# Patient Record
Sex: FEMALE | Race: ASIAN | NOT HISPANIC OR LATINO | URBAN - METROPOLITAN AREA
[De-identification: names, ages, dates, MRNs, and addresses within clinical notes are randomized per-mention and may not be internally consistent; named-entity substitution may affect disease eponyms.]

---

## 2024-05-06 ENCOUNTER — EMERGENCY (EMERGENCY)
Facility: HOSPITAL | Age: 29
LOS: 1 days | Discharge: HOME | End: 2024-05-06
Attending: EMERGENCY MEDICINE
Payer: COMMERCIAL

## 2024-05-06 VITALS
HEART RATE: 78 BPM | SYSTOLIC BLOOD PRESSURE: 102 MMHG | TEMPERATURE: 98 F | RESPIRATION RATE: 18 BRPM | OXYGEN SATURATION: 100 % | DIASTOLIC BLOOD PRESSURE: 68 MMHG

## 2024-05-06 VITALS
OXYGEN SATURATION: 97 % | TEMPERATURE: 99 F | RESPIRATION RATE: 18 BRPM | DIASTOLIC BLOOD PRESSURE: 98 MMHG | HEART RATE: 98 BPM | SYSTOLIC BLOOD PRESSURE: 150 MMHG

## 2024-05-06 LAB
ALBUMIN SERPL ELPH-MCNC: 4.4 G/DL — SIGNIFICANT CHANGE UP (ref 3.3–5)
ALP SERPL-CCNC: 84 U/L — SIGNIFICANT CHANGE UP (ref 40–120)
ALT FLD-CCNC: 14 U/L — SIGNIFICANT CHANGE UP (ref 10–45)
ANION GAP SERPL CALC-SCNC: 13 MMOL/L — SIGNIFICANT CHANGE UP (ref 5–17)
APPEARANCE UR: CLEAR — SIGNIFICANT CHANGE UP
APTT BLD: 30.3 SEC — SIGNIFICANT CHANGE UP (ref 24.5–35.6)
AST SERPL-CCNC: 19 U/L — SIGNIFICANT CHANGE UP (ref 10–40)
BACTERIA # UR AUTO: ABNORMAL /HPF
BASE EXCESS BLDV CALC-SCNC: 0.3 MMOL/L — SIGNIFICANT CHANGE UP (ref -2–3)
BASOPHILS # BLD AUTO: 0.05 K/UL — SIGNIFICANT CHANGE UP (ref 0–0.2)
BASOPHILS NFR BLD AUTO: 0.5 % — SIGNIFICANT CHANGE UP (ref 0–2)
BILIRUB SERPL-MCNC: 0.5 MG/DL — SIGNIFICANT CHANGE UP (ref 0.2–1.2)
BILIRUB UR-MCNC: NEGATIVE — SIGNIFICANT CHANGE UP
BUN SERPL-MCNC: 10 MG/DL — SIGNIFICANT CHANGE UP (ref 7–23)
CA-I SERPL-SCNC: 1.2 MMOL/L — SIGNIFICANT CHANGE UP (ref 1.15–1.33)
CALCIUM SERPL-MCNC: 9.4 MG/DL — SIGNIFICANT CHANGE UP (ref 8.4–10.5)
CAST: 0 /LPF — SIGNIFICANT CHANGE UP (ref 0–4)
CHLORIDE BLDV-SCNC: 103 MMOL/L — SIGNIFICANT CHANGE UP (ref 96–108)
CHLORIDE SERPL-SCNC: 103 MMOL/L — SIGNIFICANT CHANGE UP (ref 96–108)
CO2 BLDV-SCNC: 27 MMOL/L — HIGH (ref 22–26)
CO2 SERPL-SCNC: 23 MMOL/L — SIGNIFICANT CHANGE UP (ref 22–31)
COLOR SPEC: YELLOW — SIGNIFICANT CHANGE UP
CREAT SERPL-MCNC: 0.64 MG/DL — SIGNIFICANT CHANGE UP (ref 0.5–1.3)
DIFF PNL FLD: ABNORMAL
EGFR: 121 ML/MIN/1.73M2 — SIGNIFICANT CHANGE UP
EOSINOPHIL # BLD AUTO: 0.05 K/UL — SIGNIFICANT CHANGE UP (ref 0–0.5)
EOSINOPHIL NFR BLD AUTO: 0.5 % — SIGNIFICANT CHANGE UP (ref 0–6)
GAS PNL BLDV: 135 MMOL/L — LOW (ref 136–145)
GAS PNL BLDV: SIGNIFICANT CHANGE UP
GAS PNL BLDV: SIGNIFICANT CHANGE UP
GLUCOSE BLDV-MCNC: 107 MG/DL — HIGH (ref 70–99)
GLUCOSE SERPL-MCNC: 110 MG/DL — HIGH (ref 70–99)
GLUCOSE UR QL: NEGATIVE MG/DL — SIGNIFICANT CHANGE UP
HCG SERPL-ACNC: <2 MIU/ML — SIGNIFICANT CHANGE UP
HCO3 BLDV-SCNC: 25 MMOL/L — SIGNIFICANT CHANGE UP (ref 22–29)
HCT VFR BLD CALC: 40.2 % — SIGNIFICANT CHANGE UP (ref 34.5–45)
HCT VFR BLDA CALC: 41 % — SIGNIFICANT CHANGE UP (ref 34.5–46.5)
HGB BLD CALC-MCNC: 13.6 G/DL — SIGNIFICANT CHANGE UP (ref 11.7–16.1)
HGB BLD-MCNC: 13.1 G/DL — SIGNIFICANT CHANGE UP (ref 11.5–15.5)
IMM GRANULOCYTES NFR BLD AUTO: 0.5 % — SIGNIFICANT CHANGE UP (ref 0–0.9)
INR BLD: 1.01 RATIO — SIGNIFICANT CHANGE UP (ref 0.85–1.18)
KETONES UR-MCNC: NEGATIVE MG/DL — SIGNIFICANT CHANGE UP
LACTATE BLDV-MCNC: 1.6 MMOL/L — SIGNIFICANT CHANGE UP (ref 0.5–2)
LDH SERPL L TO P-CCNC: 273 U/L — HIGH (ref 50–242)
LEUKOCYTE ESTERASE UR-ACNC: NEGATIVE — SIGNIFICANT CHANGE UP
LIDOCAIN IGE QN: 18 U/L — SIGNIFICANT CHANGE UP (ref 7–60)
LYMPHOCYTES # BLD AUTO: 1.93 K/UL — SIGNIFICANT CHANGE UP (ref 1–3.3)
LYMPHOCYTES # BLD AUTO: 17.6 % — SIGNIFICANT CHANGE UP (ref 13–44)
MCHC RBC-ENTMCNC: 27.6 PG — SIGNIFICANT CHANGE UP (ref 27–34)
MCHC RBC-ENTMCNC: 32.6 GM/DL — SIGNIFICANT CHANGE UP (ref 32–36)
MCV RBC AUTO: 84.6 FL — SIGNIFICANT CHANGE UP (ref 80–100)
MONOCYTES # BLD AUTO: 0.6 K/UL — SIGNIFICANT CHANGE UP (ref 0–0.9)
MONOCYTES NFR BLD AUTO: 5.5 % — SIGNIFICANT CHANGE UP (ref 2–14)
NEUTROPHILS # BLD AUTO: 8.31 K/UL — HIGH (ref 1.8–7.4)
NEUTROPHILS NFR BLD AUTO: 75.4 % — SIGNIFICANT CHANGE UP (ref 43–77)
NITRITE UR-MCNC: POSITIVE
NRBC # BLD: 0 /100 WBCS — SIGNIFICANT CHANGE UP (ref 0–0)
PCO2 BLDV: 42 MMHG — SIGNIFICANT CHANGE UP (ref 39–42)
PH BLDV: 7.39 — SIGNIFICANT CHANGE UP (ref 7.32–7.43)
PH UR: 6 — SIGNIFICANT CHANGE UP (ref 5–8)
PLATELET # BLD AUTO: 340 K/UL — SIGNIFICANT CHANGE UP (ref 150–400)
PO2 BLDV: 67 MMHG — HIGH (ref 25–45)
POTASSIUM BLDV-SCNC: 3.8 MMOL/L — SIGNIFICANT CHANGE UP (ref 3.5–5.1)
POTASSIUM SERPL-MCNC: 3.7 MMOL/L — SIGNIFICANT CHANGE UP (ref 3.5–5.3)
POTASSIUM SERPL-SCNC: 3.7 MMOL/L — SIGNIFICANT CHANGE UP (ref 3.5–5.3)
PROT SERPL-MCNC: 7.6 G/DL — SIGNIFICANT CHANGE UP (ref 6–8.3)
PROT UR-MCNC: NEGATIVE MG/DL — SIGNIFICANT CHANGE UP
PROTHROM AB SERPL-ACNC: 10.6 SEC — SIGNIFICANT CHANGE UP (ref 9.5–13)
RBC # BLD: 4.75 M/UL — SIGNIFICANT CHANGE UP (ref 3.8–5.2)
RBC # FLD: 12.5 % — SIGNIFICANT CHANGE UP (ref 10.3–14.5)
RBC CASTS # UR COMP ASSIST: 17 /HPF — HIGH (ref 0–4)
SAO2 % BLDV: 93.5 % — HIGH (ref 67–88)
SODIUM SERPL-SCNC: 139 MMOL/L — SIGNIFICANT CHANGE UP (ref 135–145)
SP GR SPEC: 1.02 — SIGNIFICANT CHANGE UP (ref 1–1.03)
SQUAMOUS # UR AUTO: 2 /HPF — SIGNIFICANT CHANGE UP (ref 0–5)
UROBILINOGEN FLD QL: 1 MG/DL — SIGNIFICANT CHANGE UP (ref 0.2–1)
WBC # BLD: 10.99 K/UL — HIGH (ref 3.8–10.5)
WBC # FLD AUTO: 10.99 K/UL — HIGH (ref 3.8–10.5)
WBC UR QL: 2 /HPF — SIGNIFICANT CHANGE UP (ref 0–5)

## 2024-05-06 PROCEDURE — 85730 THROMBOPLASTIN TIME PARTIAL: CPT

## 2024-05-06 PROCEDURE — 86301 IMMUNOASSAY TUMOR CA 19-9: CPT

## 2024-05-06 PROCEDURE — 80053 COMPREHEN METABOLIC PANEL: CPT

## 2024-05-06 PROCEDURE — 74177 CT ABD & PELVIS W/CONTRAST: CPT | Mod: MC

## 2024-05-06 PROCEDURE — 85014 HEMATOCRIT: CPT

## 2024-05-06 PROCEDURE — 74177 CT ABD & PELVIS W/CONTRAST: CPT | Mod: 26,MC

## 2024-05-06 PROCEDURE — 83690 ASSAY OF LIPASE: CPT

## 2024-05-06 PROCEDURE — 81001 URINALYSIS AUTO W/SCOPE: CPT

## 2024-05-06 PROCEDURE — 99283 EMERGENCY DEPT VISIT LOW MDM: CPT

## 2024-05-06 PROCEDURE — 82378 CARCINOEMBRYONIC ANTIGEN: CPT

## 2024-05-06 PROCEDURE — 83615 LACTATE (LD) (LDH) ENZYME: CPT

## 2024-05-06 PROCEDURE — 99285 EMERGENCY DEPT VISIT HI MDM: CPT | Mod: 25

## 2024-05-06 PROCEDURE — 76830 TRANSVAGINAL US NON-OB: CPT

## 2024-05-06 PROCEDURE — 76830 TRANSVAGINAL US NON-OB: CPT | Mod: 26

## 2024-05-06 PROCEDURE — 93975 VASCULAR STUDY: CPT

## 2024-05-06 PROCEDURE — 82947 ASSAY GLUCOSE BLOOD QUANT: CPT

## 2024-05-06 PROCEDURE — 99285 EMERGENCY DEPT VISIT HI MDM: CPT

## 2024-05-06 PROCEDURE — 36415 COLL VENOUS BLD VENIPUNCTURE: CPT

## 2024-05-06 PROCEDURE — 85018 HEMOGLOBIN: CPT

## 2024-05-06 PROCEDURE — 82803 BLOOD GASES ANY COMBINATION: CPT

## 2024-05-06 PROCEDURE — 83520 IMMUNOASSAY QUANT NOS NONAB: CPT

## 2024-05-06 PROCEDURE — 84702 CHORIONIC GONADOTROPIN TEST: CPT

## 2024-05-06 PROCEDURE — 82330 ASSAY OF CALCIUM: CPT

## 2024-05-06 PROCEDURE — 85025 COMPLETE CBC W/AUTO DIFF WBC: CPT

## 2024-05-06 PROCEDURE — 93975 VASCULAR STUDY: CPT | Mod: 26

## 2024-05-06 PROCEDURE — 36000 PLACE NEEDLE IN VEIN: CPT | Mod: XU

## 2024-05-06 PROCEDURE — 84132 ASSAY OF SERUM POTASSIUM: CPT

## 2024-05-06 PROCEDURE — 85610 PROTHROMBIN TIME: CPT

## 2024-05-06 PROCEDURE — 86304 IMMUNOASSAY TUMOR CA 125: CPT

## 2024-05-06 PROCEDURE — 83605 ASSAY OF LACTIC ACID: CPT

## 2024-05-06 PROCEDURE — 87086 URINE CULTURE/COLONY COUNT: CPT

## 2024-05-06 PROCEDURE — 82435 ASSAY OF BLOOD CHLORIDE: CPT

## 2024-05-06 PROCEDURE — 84295 ASSAY OF SERUM SODIUM: CPT

## 2024-05-06 RX ORDER — SODIUM CHLORIDE 9 MG/ML
1000 INJECTION INTRAMUSCULAR; INTRAVENOUS; SUBCUTANEOUS ONCE
Refills: 0 | Status: COMPLETED | OUTPATIENT
Start: 2024-05-06 | End: 2024-05-06

## 2024-05-06 RX ADMIN — Medication 1 TABLET(S): at 18:41

## 2024-05-06 RX ADMIN — SODIUM CHLORIDE 1000 MILLILITER(S): 9 INJECTION INTRAMUSCULAR; INTRAVENOUS; SUBCUTANEOUS at 11:49

## 2024-05-06 NOTE — ED ADULT NURSE NOTE - OBJECTIVE STATEMENT
29F aaox4 ambulatory with no PMHx, p/w c/o RLQ abd pain since Friday, also c/o nausea no vomiting. On exam, abd tender in the RLQ on palpation, had just had her menstruation few days ago. Patient denied any chills or fever, VS WDL.  Rt Hand 20g IV access inserted, labs sent pending results.

## 2024-05-06 NOTE — CONSULT NOTE ADULT - SUBJECTIVE AND OBJECTIVE BOX
GYN Consult Note    ALEXEI GAMBINO  29y  Female [id number]    HPI:  29y G0 LMP 5/3 presents to the ED with three days of RLQ pain, nausea, decreased appetite and chills. Pt describes the pain as constant, sharp and non-radiating. It is worse when she sits upright and walks. Yesterday her pain was the worst but it has lessened today. She has not needed to take pain medication today and declined when the ED offered some to her. Of note, patient got her period the same day that the pain started but she said it does not feel like period cramps she has had in the past. Pt denies fever, dysuria, vomiting, malodorous vaginal discharge, night sweats, unintended weight changes, change in stool caliber, chest pain, SOB, palpitations.      Name of GYN Physician: Dr. Mary Lou Bob ([place])  OBHx:  G0  GynHx: h/o abnormal pap in December (HPV+), s/p repeat wnl in 2024. Denies fibroids, cysts, endometriosis, STI's  PMH: eczema  PSH: denies  Meds: none  Allx: azithromycin (hives), kiwi (pruritis)  Social History:  Denies smoking use, drug use, alcohol use.    Vital Signs Last 24 Hrs  T(C): 36.9 (06 May 2024 11:50), Max: 37.1 (06 May 2024 10:03)  T(F): 98.4 (06 May 2024 11:50), Max: 98.8 (06 May 2024 10:03)  HR: 80 (06 May 2024 13:50) (80 - 98)  BP: 94/64 (06 May 2024 13:50) (94/64 - 150/98)  BP(mean): 81 (06 May 2024 11:50) (81 - 81)  RR: 16 (06 May 2024 13:50) (16 - 18)  SpO2: 100% (06 May 2024 13:50) (97% - 100%)    Parameters below as of 06 May 2024 13:50  Patient On (Oxygen Delivery Method): room air        Physical Exam:   General: sitting comfortably in bed, NAD. Pt able to sit up/lay down with ease.  HEENT: full ROM  CV: well perfused  Lungs: breathing comfortably on RA  Abd: Soft, mildly tender in bilateral lower quadrants, non-distended. Firm mass palpable in suprapubic area extending skilled nursing to umbilicus.  :  No bleeding on pad.    External labia wnl.  Bimanual exam with no cervical motion tenderness, uterus nonpalpable given mass, adnexal fullness b/l.  Cervix closed.  Speculum Exam: Scant blood in vault. No active bleeding from os. Grossly normal appearing cervix. Physiologic discharge.    Ext: non-tender b/l, no edema     LABS:    HCG: <2                        13.1   10.99 )-----------( 340      ( 06 May 2024 12:05 )             40.2         139  |  103  |  10  ----------------------------<  110<H>  3.7   |  23  |  0.64    Ca    9.4      06 May 2024 12:05    TPro  7.6  /  Alb  4.4  /  TBili  0.5  /  DBili  x   /  AST  19  /  ALT  14  /  AlkPhos  84  -      PT/INR - ( 06 May 2024 12:05 )   PT: 10.6 sec;   INR: 1.01 ratio    PTT - ( 06 May 2024 12:05 )  PTT:30.3 sec    Urinalysis Basic - ( 06 May 2024 12:07 )    Color: Yellow / Appearance: Clear / S.025 / pH: x  Gluc: x / Ketone: Negative mg/dL  / Bili: Negative / Urobili: 1.0 mg/dL   Blood: x / Protein: Negative mg/dL / Nitrite: Positive   Leuk Esterase: Negative / RBC: 17 /HPF / WBC 2 /HPF   Sq Epi: x / Non Sq Epi: 2 /HPF / Bacteria: Occasional /HPF      RADIOLOGY & ADDITIONAL STUDIES:  < from: CT Abdomen and Pelvis w/ IV Cont (24 @ 12:53) >  ACC: [id number] EXAM:  CT ABDOMEN AND PELVIS IC   ORDERED BY: THOMPSON JONES     PROCEDURE DATE:  2024          INTERPRETATION:  CLINICAL INFORMATION: Right lower quadrant pain    COMPARISON: No prior examinations are available for comparisonat the   time of this interpretation.    CONTRAST/COMPLICATIONS:  IV Contrast: Omnipaque 350  90 cc administered   10 cc discarded  Oral Contrast: NONE  Complications: None reported at time of study completion    PROCEDURE:  CT of the Abdomen and Pelvis was performed.  Sagittal and coronal reformats were performed.    FINDINGS:  LOWER CHEST: Trace pleural effusions, larger on the right    LIVER: Few subcentimeter right lobe hypodensities too small for   definitive characterization by CT (axial series image 26)  BILE DUCTS: Normal caliber.  GALLBLADDER: Nondistended. Contains sludge versus noncalcified gallstones.  SPLEEN: Within normal limits.  PANCREAS: Within normal limits.  ADRENALS: Within normal limits.  KIDNEYS/URETERS: Within normal limits.    BLADDER: Minimally distended.  REPRODUCTIVE ORGANS: Abnormally enlarged heterogeneous solid and cystic   appearance of the left ovary/fallopian tube measuring 13.6 x 8.1 x 11.4   cm highly suspicious for carcinoma.  Abnormal right ovary/adnexa with similar heterogeneous solid and cystic   appearance approximately measures 8 x 6 x 6.2 cm  Mild leftward uterine displacement. Mildly heterogeneous appearance of   the uterus    BOWEL: No bowel obstruction. Appendix is nondistended and located   posterior to the abnormal left ovary  PERITONEUM: Small volume omental carcinomatosis.  Trace volume abdominal and pelvic ascites. Areas of peritoneal thickening   with nodularity suspicious for metastatic disease  VESSELS: Within normal limits.  RETROPERITONEUM/LYMPH NODES: No lymphadenopathy.  ABDOMINAL WALL: No significant acute abnormality.  BONES: No significant acute bony abnormality.    IMPRESSION:  Bilateral ovarian/adnexal heterogeneous solid and cystic masses in most   in keeping with carcinoma. Differential considerations include a primary   gynecologic origin tumor versus metastases. 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 Trace   volume of abdominopelvic ascites    Trace pleural effusions, larger on the right    Indeterminant hepatic lesions, metastatic disease is not excluded    --- End of Report ---    KOBI MARTIN MD; Attending Radiologist  This document has been electronically signed. May  6 2024  1:22PM    < end of copied text >     GYN Consult Note    ALEXEI GAMBINO  29y  Female [id number]    HPI:  29y G0 LMP 5/3 presents to the ED with three days of RLQ pain, nausea, decreased appetite and chills. Pt describes the pain as constant, sharp and non-radiating. It is worse when she sits upright and walks. Yesterday her pain was the worst but it has lessened today. She has not needed to take pain medication today and declined when the ED offered some to her. Of note, patient got her period the same day that the pain started but she said it does not feel like period cramps she has had in the past. Pt denies fever, dysuria, vomiting, malodorous vaginal discharge, night sweats, unintended weight changes, change in stool caliber, chest pain, SOB, palpitations.      Name of GYN Physician: Dr. Mary Lou Bob ([place])  OBHx:  G0  GynHx: h/o abnormal pap in December (HPV+), s/p repeat wnl in 2024. Denies fibroids, cysts, endometriosis, STI's  PMH: eczema  PSH: central line placed as a toddler for unknown indication  Meds: none  Allx: azithromycin (hives), kiwi (pruritis)  Social History:  Denies smoking use, drug use, alcohol use.  FH: maternal aunts with breast cancer. Denies family history of colon, pancreatic, uterine, ovarian cancer.    Vital Signs Last 24 Hrs  T(C): 36.9 (06 May 2024 11:50), Max: 37.1 (06 May 2024 10:03)  T(F): 98.4 (06 May 2024 11:50), Max: 98.8 (06 May 2024 10:03)  HR: 80 (06 May 2024 13:50) (80 - 98)  BP: 94/64 (06 May 2024 13:50) (94/64 - 150/98)  BP(mean): 81 (06 May 2024 11:50) (81 - 81)  RR: 16 (06 May 2024 13:50) (16 - 18)  SpO2: 100% (06 May 2024 13:50) (97% - 100%)    Parameters below as of 06 May 2024 13:50  Patient On (Oxygen Delivery Method): room air        Physical Exam:   General: sitting comfortably in bed, NAD. Pt able to sit up/lay down with ease.  HEENT: full ROM  CV: well perfused  Lungs: breathing comfortably on RA  Abd: Soft, mildly tender in bilateral lower quadrants, non-distended. Firm mass palpable in suprapubic area extending FPC to umbilicus.  :  No bleeding on pad.    External labia wnl.  Bimanual exam with no cervical motion tenderness, uterus nonpalpable given mass, adnexal fullness b/l.  Cervix closed.  Speculum Exam: Scant blood in vault. No active bleeding from os. Grossly normal appearing cervix. Physiologic discharge.    Ext: non-tender b/l, no edema     LABS:    HCG: <2                        13.1   10.99 )-----------( 340      ( 06 May 2024 12:05 )             40.2         139  |  103  |  10  ----------------------------<  110<H>  3.7   |  23  |  0.64    Ca    9.4      06 May 2024 12:05    TPro  7.6  /  Alb  4.4  /  TBili  0.5  /  DBili  x   /  AST  19  /  ALT  14  /  AlkPhos  84  -      PT/INR - ( 06 May 2024 12:05 )   PT: 10.6 sec;   INR: 1.01 ratio    PTT - ( 06 May 2024 12:05 )  PTT:30.3 sec    Urinalysis Basic - ( 06 May 2024 12:07 )    Color: Yellow / Appearance: Clear / S.025 / pH: x  Gluc: x / Ketone: Negative mg/dL  / Bili: Negative / Urobili: 1.0 mg/dL   Blood: x / Protein: Negative mg/dL / Nitrite: Positive   Leuk Esterase: Negative / RBC: 17 /HPF / WBC 2 /HPF   Sq Epi: x / Non Sq Epi: 2 /HPF / Bacteria: Occasional /HPF      RADIOLOGY & ADDITIONAL STUDIES:  < from: CT Abdomen and Pelvis w/ IV Cont (24 @ 12:53) >  ACC: [id number] EXAM:  CT ABDOMEN AND PELVIS IC   ORDERED BY: THOMPSON JONES     PROCEDURE DATE:  2024          INTERPRETATION:  CLINICAL INFORMATION: Right lower quadrant pain    COMPARISON: No prior examinations are available for comparisonat the   time of this interpretation.    CONTRAST/COMPLICATIONS:  IV Contrast: Omnipaque 350  90 cc administered   10 cc discarded  Oral Contrast: NONE  Complications: None reported at time of study completion    PROCEDURE:  CT of the Abdomen and Pelvis was performed.  Sagittal and coronal reformats were performed.    FINDINGS:  LOWER CHEST: Trace pleural effusions, larger on the right    LIVER: Few subcentimeter right lobe hypodensities too small for   definitive characterization by CT (axial series image 26)  BILE DUCTS: Normal caliber.  GALLBLADDER: Nondistended. Contains sludge versus noncalcified gallstones.  SPLEEN: Within normal limits.  PANCREAS: Within normal limits.  ADRENALS: Within normal limits.  KIDNEYS/URETERS: Within normal limits.    BLADDER: Minimally distended.  REPRODUCTIVE ORGANS: Abnormally enlarged heterogeneous solid and cystic   appearance of the left ovary/fallopian tube measuring 13.6 x 8.1 x 11.4   cm highly suspicious for carcinoma.  Abnormal right ovary/adnexa with similar heterogeneous solid and cystic   appearance approximately measures 8 x 6 x 6.2 cm  Mild leftward uterine displacement. Mildly heterogeneous appearance of   the uterus    BOWEL: No bowel obstruction. Appendix is nondistended and located   posterior to the abnormal left ovary  PERITONEUM: Small volume omental carcinomatosis.  Trace volume abdominal and pelvic ascites. Areas of peritoneal thickening   with nodularity suspicious for metastatic disease  VESSELS: Within normal limits.  RETROPERITONEUM/LYMPH NODES: No lymphadenopathy.  ABDOMINAL WALL: No significant acute abnormality.  BONES: No significant acute bony abnormality.    IMPRESSION:  Bilateral ovarian/adnexal heterogeneous solid and cystic masses in most   in keeping with carcinoma. Differential considerations include a primary   gynecologic origin tumor versus metastases. 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 Trace   volume of abdominopelvic ascites    Trace pleural effusions, larger on the right    Indeterminant hepatic lesions, metastatic disease is not excluded    --- End of Report ---    KOBI MARTIN MD; Attending Radiologist  This document has been electronically signed. May  6 2024  1:22PM    < end of copied text >

## 2024-05-06 NOTE — ED ADULT NURSE NOTE - NSSEPSISSUSPECTED_ED_A_ED
[JVD] : no jugular venous distention  [Purpura] : no purpura  [Petechiae] : no petechiae [Skin Ulcer] : no ulcer [Skin Induration] : no induration [Alert] : alert [Oriented to Person] : oriented to person [Oriented to Place] : oriented to place [Oriented to Time] : oriented to time [Calm] : calm [de-identified] : non toxic in no acute distress  [de-identified] : NC/AT PERRL EOMI no scleral icterus  [de-identified] : trachea midline  No [de-identified] : no audible wheezing or stridor [de-identified] : obese soft, no localizing tenderness, no guarding, no rebound, no masses  [de-identified] : FROM of all extremities with no gross deformity or angulation, there is no abdominal wall herniation  [de-identified] : mood is calm

## 2024-05-06 NOTE — ED PROVIDER NOTE - PATIENT PORTAL LINK FT
You can access the FollowMyHealth Patient Portal offered by [facility] by registering at the following website: [email or web address]. By joining [facility]’s FollowMyHealth portal, you will also be able to view your health information using other applications (apps) compatible with our system.

## 2024-05-06 NOTE — CHART NOTE - NSCHARTNOTEFT_GEN_A_CORE
EMERGENCY : JEFE consulted by medical team to provide support for 30 y/o female who presents with abdominal pain, as per ED RN patient found to have a new CT concerning for "carcinoma with mets." LCSW met with patient at bedside and introduced self and role to which she verbalized understanding. Patient is A&Ox4 at this time. Patient endorses good communication with medical teams. Patient is tearful but appropriate at this time, endorses feeling scared and nervous of the unknown. LCSW provided much emotional support to patient and validation. Patient states she has informed her boyfriend and her cousin of ED visit and findings, and that they have been supportive. She is appreciative of social work support and intervention. She states no further needs for LCSW at present. Contact information provided to patient and ongoing social work availability ensured. ED PA aware of above information. Social work continues to remain available as needed.

## 2024-05-06 NOTE — CONSULT NOTE ADULT - ATTENDING COMMENTS
Patient seen and discussed with resident. Presents with complaints of lower abdominal pain, nausea and decreased appetite.   Past medical and surgical history reviewed. Allergies confirmed.   Vitals reviewed   Gen: no acute distress   Abd: soft, nontender without rebound or guarding   Resident exam reviewed   Labs and imaging reviewed - CT suspicious for malignancy with bilateral adnexal masses, small amount of ascites and small volume of carcinomatosis   A/P: Possible abdominal malignancy   -no acute intervention required   -agree with resident assessment and plan   -referral information given to Dr. Bahena GYN Onc   -return precautions reviewed     FRANKY Ibarra

## 2024-05-06 NOTE — ED PROVIDER NOTE - CARE PROVIDER_API CALL
Rhonda Schultz  Gynecologic Oncology  [address] [place] [id number]  Phone: [phone]  Fax: [phone]  Follow Up Time: 1-3 Days

## 2024-05-06 NOTE — CONSULT NOTE ADULT - ASSESSMENT
29y G0 LMP 5/3 presents to the ED with three days of RLQ pain, nausea, decreased appetite and chills found to have bilateral adnexal masses with omental carcinomatosis and peritoneal thickening concerning for malignancy of GYN origin with possible metastasis. Pt with minimal tenderness on exam, pain well controlled without pain medication. Pt without leukocytosis or anemia. VSS. Pt is clinically and hemodynamically stable.     - Recommend drawing tumor markers: CA 19-9, , CEA  - Recommend TVUS for further characterization of masses  - UA+, suspicious for UTI, although pt asymptomatic. Recommend treatment with Bactrim  - Pt given contact information for GYN oncologist Dr. Rhonda Schultz ([phone])  - Pt to f/u with GYN and GYN onc outpatient      Pt seen and evaluated with GYN service attending Gudelia Shelton PGY2  29y G0 LMP 5/3 presents to the ED with three days of RLQ pain, nausea, decreased appetite and chills found to have bilateral adnexal masses with omental carcinomatosis and peritoneal thickening concerning for malignancy of GYN origin with possible metastasis. Pt with minimal tenderness on exam, pain well controlled without pain medication. Pt without leukocytosis or anemia. VSS. Pt is clinically and hemodynamically stable.     - Recommend drawing tumor markers: CA 19-9, , CEA, LDH, Inhibin A  - Recommend TVUS for further characterization of masses  - UA+, suspicious for UTI, although pt asymptomatic. Recommend treatment with Bactrim  - Pt given contact information for GYN oncologist Dr. Rhonda Schultz ([phone])  - Pt to f/u with GYN and GYN onc outpatient      Pt seen and evaluated with GYN service attending Gudelai Shelton PGY2  29y G0 LMP 5/3 presents to the ED with three days of RLQ pain, nausea, decreased appetite and chills found to have bilateral adnexal masses with omental carcinomatosis and peritoneal thickening concerning for malignancy of GYN origin with possible metastasis. Pt with minimal tenderness on exam, pain well controlled without pain medication. Pt without leukocytosis or anemia. VSS. Pt is clinically and hemodynamically stable.     - Recommend drawing tumor markers: CA 19-9, , CEA, LDH, Inhibin B  - Recommend TVUS for further characterization of masses  - UA+, suspicious for UTI, although pt asymptomatic. Recommend treatment with Bactrim  - Pt given contact information for GYN oncologist Dr. Rhonda Schultz ([phone])  - Pt to f/u with GYN and GYN onc outpatient      Pt seen and evaluated with GYN service attending Gudelia Shelton PGY2

## 2024-05-06 NOTE — ED PROVIDER NOTE - ADDITIONAL NOTES AND INSTRUCTIONS:
Please excuse for work through 5/12/24. May return to work on 5/13 OR sooner per patient preference.

## 2024-05-06 NOTE — ED PROVIDER NOTE - NSFOLLOWUPINSTRUCTIONS_ED_ALL_ED_FT
As discussed in the emergency department, your imaging studies were concerning for carcinoma/cancerous process.  Referrals are being placed for gynecology/oncology follow-up for you as well as follow-up via our cancer care direct program.  Please refer to the pamphlet given to you.    If you prefer you may reach out to Gynecologist Dr. Rhonda Schultz directly to discuss follow up appointment ([phone]).    You should receive a call within the next 2-3 days with appointments.     Please bring a copy of all your results with you.    You are being prescribed an antibiotic called Bactrim to be taken for a possible urinary tract infection.    For pain you may take Tylenol 650 mg every 6 hours as needed.  Additionally may take Motrin 400 mg every 8 hours as needed for additional pain relief.    Return the Emergency Department immediately if you develop any new/worsening symptoms including but not limited to fever, worsening abdominal pain, vomiting, inability eat/drink, chest pain, shortness of breath or any other concerns.

## 2024-05-06 NOTE — ED PROVIDER NOTE - PROGRESS NOTE DETAILS
CT results noted, concerning for carcinoma with peritoneal mets.  Discussed with GYN resident Gudelia who will come see patient. - Antonio Bryant PA-C CT results noted, concerning for carcinoma with mets.  Discussed with GYN resident Gudelia who will come see patient. - Antonio Bryant PA-C CT results noted, concerning for carcinoma with mets.  Discussed with GYN resident Gudelia who will come see patient. Patient updated on results and c/f carcinoma/cancerous process and plan for GYN eval. - Antonio Byrant PA-C received sign out pending gyn recs. briefly, 29 yr f w metastatic disease of likely gyn origin in CT. labs reassuring. - Demetrius Barton MD attending physician

## 2024-05-06 NOTE — ED PROVIDER NOTE - NS ED ATTENDING STATEMENT MOD
I have seen and examined this patient and fully participated in the care of this patient as the teaching attending.  The service was shared with the SHAHRIAR.  I reviewed and verified the documentation.

## 2024-05-06 NOTE — ED PROVIDER NOTE - CCCP TRG CHIEF CMPLNT
Patient requesting a refill for guaiFENesin-codeine (GUAIFENESIN AC) 100-10 MG/5ML liquid which was refilled on 11/27/2023. Is it ok to refill?
abdominal pain

## 2024-05-06 NOTE — ED PROVIDER NOTE - CLINICAL SUMMARY MEDICAL DECISION MAKING FREE TEXT BOX
[place]: Patient is a 29-year-old female who presents with 3 days of abdominal pain patient with significant physical exam for right lower quadrant pain.  Patient with no CVA tenderness.  Will evaluate for appendicitis ovarian pathology and urine.  Appendicitis being the most probable and concerning.  Will treat symptoms Lab studies ordered, independently reviewed and acted on as appropriate.

## 2024-05-06 NOTE — ED PROVIDER NOTE - OBJECTIVE STATEMENT
Patient is a 29-year-old woman who presents to the emergency department with 3 days of right lower quadrant pain.  Patient started light right lower quadrant and has stayed there.  Patient is made worse by certain positions and movements.  Patient has been eating a little less and moving her bowels a little less but still does both.  Patient has not had a fever.  Patient currently having her period since Friday.  Patient with no urinary complaints.  Patient has never had pain like this before.  No nausea or vomiting. Patient is sexually active but does not feel that she is pregnant.

## 2024-05-07 LAB
CANCER AG125 SERPL-ACNC: 810 U/ML — HIGH
CANCER AG19-9 SERPL-ACNC: 33 U/ML — SIGNIFICANT CHANGE UP
CEA SERPL-MCNC: 0.8 NG/ML — SIGNIFICANT CHANGE UP (ref 0–3.8)
CULTURE RESULTS: SIGNIFICANT CHANGE UP
SPECIMEN SOURCE: SIGNIFICANT CHANGE UP

## 2024-05-07 NOTE — ED POST DISCHARGE NOTE - RESULT SUMMARY
Incidental/recommended f/u list: Patient on list for CT findings c/f carcinoma with mets. I cared for patient in ED yesterday and relayed all results to pt as per my previous documentation. Cancer care and Gyn Onc referrals placed in system already.  - Antonio Bryant PA-C

## 2024-05-08 PROBLEM — Z00.00 ENCOUNTER FOR PREVENTIVE HEALTH EXAMINATION: Status: ACTIVE | Noted: 2024-05-08

## 2024-05-09 ENCOUNTER — NON-APPOINTMENT (OUTPATIENT)
Age: 29
End: 2024-05-09

## 2024-05-10 ENCOUNTER — APPOINTMENT (OUTPATIENT)
Dept: GYNECOLOGIC ONCOLOGY | Facility: CLINIC | Age: 29
End: 2024-05-10
Payer: COMMERCIAL

## 2024-05-10 VITALS
WEIGHT: 154 LBS | TEMPERATURE: 100 F | BODY MASS INDEX: 27.29 KG/M2 | HEART RATE: 87 BPM | SYSTOLIC BLOOD PRESSURE: 100 MMHG | DIASTOLIC BLOOD PRESSURE: 66 MMHG | HEIGHT: 63 IN

## 2024-05-10 DIAGNOSIS — C76.2 MALIGNANT NEOPLASM OF ABDOMEN: ICD-10-CM

## 2024-05-10 DIAGNOSIS — N83.8 OTHER NONINFLAMMATORY DISORDERS OF OVARY, FALLOPIAN TUBE AND BROAD LIGAMENT: ICD-10-CM

## 2024-05-10 DIAGNOSIS — R97.1 ELEVATED CANCER ANTIGEN 125 [CA 125]: ICD-10-CM

## 2024-05-10 PROCEDURE — 99459 PELVIC EXAMINATION: CPT

## 2024-05-10 PROCEDURE — 99205 OFFICE O/P NEW HI 60 MIN: CPT

## 2024-05-10 NOTE — PHYSICAL EXAM
[Chaperone Present] : A chaperone was present in the examining room during all aspects of the physical examination [Absent] : Adnexa(ae): Absent [Abnormal] : Bimanual Exam: Abnormal [Normal] : Recto-Vaginal Exam: Normal [41148] : A chaperone was present during the pelvic exam. [de-identified] : cystic mass palpable at level of umbilicus

## 2024-05-10 NOTE — HISTORY OF PRESENT ILLNESS
[FreeTextEntry1] : GYN:  Mary Lou Dahl  Self referred  30 yo G0 who recently presented to the ER at [facility] on 5/6 with 3 days of RLQ pain, nausea, and decreased appetite.  Workup included CT A/P which revealed abnormally enlarged heterogeneous solid and cystic  appearance of the left ovary/fallopian tube measuring 13.6 x 8.1 x 11.4cm highly suspicious for carcinoma. Abnormal right ovary/adnexa with similar heterogeneous solid and cystic appearance approximately measures 8 x 6 x 6.2 cm.  Mild leftward uterine displacement. Mildly heterogeneous appearance of  the uterus. CA-125 is elevated at 810, ,  33, CEA 0.8  5/2024 CT A/P - bilateral ovarian/adnexal solid and cystic masses, small volume carcinomatosis, trace ascites, trace pleural effusions, indeterminant hepatic lesions,   She last saw Dr. Dahl for annual gyn exam in 12/2023.  Pap smear negative.  Periods are regular with no intermenstrual bleeding.  Desires future fertility, currently in a relationship.

## 2024-05-10 NOTE — DISCUSSION/SUMMARY
[Reviewed Clinical Lab Test(s)] : Results of clinical tests were reviewed. [Reviewed Radiology Report(s)] : Radiology reports were reviewed. [Reviewed Radiology Film/Image(s)] : Images from radiology studies were reviewed and examined. [FreeTextEntry1] : 28 yo with bilateral complex adnexal masses, elevated   I discussed my recommendations with Ms. Meyers and her mother in detail.    The findings are highly suspicious for ovarian malignancy.  I am recommending surgical intervention.  Given imaging findings, I would proceed with open approach via vertical midline incision.  I discussed that fertility sparing surgery may not be feasible pending intraoperative findings.  I reviewed the images from recent CT with the patient and her mother.  I also discussed that oocyte cryopreservation would not be feasible given bilateral ovarian involvement.  CT chest will be obtained to evaluate for metastatic disease.  Plan for surgery ASAP.  Postoperative recovery and expectations discussed.  She continues to have pain and recommendations regarding pain control were discussed.

## 2024-05-10 NOTE — PAST MEDICAL HISTORY
[Menstruating] : The patient is menstruating [Menarche Age ____] : age at menarche was [unfilled] [Definite ___ (Date)] : the last menstrual period was [unfilled]

## 2024-05-12 ENCOUNTER — TRANSCRIPTION ENCOUNTER (OUTPATIENT)
Age: 29
End: 2024-05-12

## 2024-05-13 ENCOUNTER — TRANSCRIPTION ENCOUNTER (OUTPATIENT)
Age: 29
End: 2024-05-13

## 2024-05-13 LAB — INHIBIN B SERUM: 62.1 PG/ML — SIGNIFICANT CHANGE UP

## 2024-05-14 ENCOUNTER — OUTPATIENT (OUTPATIENT)
Dept: OUTPATIENT SERVICES | Facility: HOSPITAL | Age: 29
LOS: 1 days | End: 2024-05-14
Payer: COMMERCIAL

## 2024-05-14 ENCOUNTER — OUTPATIENT (OUTPATIENT)
Dept: OUTPATIENT SERVICES | Facility: HOSPITAL | Age: 29
LOS: 1 days | End: 2024-05-14

## 2024-05-14 ENCOUNTER — APPOINTMENT (OUTPATIENT)
Dept: CT IMAGING | Facility: CLINIC | Age: 29
End: 2024-05-14
Payer: COMMERCIAL

## 2024-05-14 VITALS
WEIGHT: 156.09 LBS | RESPIRATION RATE: 18 BRPM | DIASTOLIC BLOOD PRESSURE: 68 MMHG | HEART RATE: 91 BPM | TEMPERATURE: 99 F | HEIGHT: 59 IN | SYSTOLIC BLOOD PRESSURE: 99 MMHG | OXYGEN SATURATION: 99 %

## 2024-05-14 DIAGNOSIS — R97.1 ELEVATED CANCER ANTIGEN 125 [CA 125]: ICD-10-CM

## 2024-05-14 DIAGNOSIS — C76.2 MALIGNANT NEOPLASM OF ABDOMEN: ICD-10-CM

## 2024-05-14 DIAGNOSIS — J06.9 ACUTE UPPER RESPIRATORY INFECTION, UNSPECIFIED: ICD-10-CM

## 2024-05-14 DIAGNOSIS — Z87.898 PERSONAL HISTORY OF OTHER SPECIFIED CONDITIONS: ICD-10-CM

## 2024-05-14 DIAGNOSIS — N83.8 OTHER NONINFLAMMATORY DISORDERS OF OVARY, FALLOPIAN TUBE AND BROAD LIGAMENT: ICD-10-CM

## 2024-05-14 LAB
A1C WITH ESTIMATED AVERAGE GLUCOSE RESULT: 5 % — SIGNIFICANT CHANGE UP (ref 4–5.6)
BLD GP AB SCN SERPL QL: NEGATIVE — SIGNIFICANT CHANGE UP
ESTIMATED AVERAGE GLUCOSE: 97 — SIGNIFICANT CHANGE UP
HCG SERPL-ACNC: <1 MIU/ML — SIGNIFICANT CHANGE UP
RH IG SCN BLD-IMP: POSITIVE — SIGNIFICANT CHANGE UP
RH IG SCN BLD-IMP: POSITIVE — SIGNIFICANT CHANGE UP

## 2024-05-14 PROCEDURE — 71250 CT THORAX DX C-: CPT

## 2024-05-14 PROCEDURE — 71250 CT THORAX DX C-: CPT | Mod: 26

## 2024-05-14 RX ORDER — IBUPROFEN 200 MG
1 TABLET ORAL
Refills: 0 | DISCHARGE

## 2024-05-14 RX ORDER — SODIUM CHLORIDE 9 MG/ML
1000 INJECTION, SOLUTION INTRAVENOUS
Refills: 0 | Status: DISCONTINUED | OUTPATIENT
Start: 2024-05-21 | End: 2024-05-21

## 2024-05-14 RX ORDER — CHLORHEXIDINE GLUCONATE 213 G/1000ML
1 SOLUTION TOPICAL DAILY
Refills: 0 | Status: DISCONTINUED | OUTPATIENT
Start: 2024-05-21 | End: 2024-05-21

## 2024-05-14 NOTE — H&P PST ADULT - NSICDXPASTMEDICALHX_GEN_ALL_CORE_FT
PAST MEDICAL HISTORY:  H/O pelvic mass     Ovarian cystic mass      PAST MEDICAL HISTORY:  H/O eczema     H/O pelvic mass     H/O upper respiratory infection     Ovarian cystic mass     Urinary tract infection      PAST MEDICAL HISTORY:  H/O eczema     H/O pelvic mass     H/O upper respiratory infection     History of keloid of skin     Ovarian cystic mass     Urinary tract infection

## 2024-05-14 NOTE — H&P PST ADULT - GENERAL COMMENTS
pt with fever and chills since 5/10/24, Dr. Schultz aware, went to PCP and started on Amoxicillin pt with fever and chills since 5/10/24, Dr. Schultz aware, went to PCP and started on Augmentin

## 2024-05-14 NOTE — H&P PST ADULT - PROBLEM SELECTOR PLAN 1
29 yrs old female diagnosed with abnormally enlarge heterogenous solid and cystic appearance of left ovary/fallopian tube highly suspicious of carcinoma presents for preop eval to have Ex Lap, resection of plevic mass, possible TREVON and BSO and debulking on 5/21/24.  labs pending,   Preop instructions provided to pt.  Famotidine and chlorhexidine scrubs provided to pt with instructions.

## 2024-05-14 NOTE — H&P PST ADULT - PROBLEM SELECTOR PLAN 2
Pt with fevers and occasional chills since Friday, pt went to PCP and started on amoxicillin.   Dr. Schultz aware. Pt told by Dr. Schultz to obtain clearance prior to surgery - will obtain copy. Pt with fevers and occasional chills since Friday, pt went to PCP and started on Augmentin   Dr. Schultz aware. Pt told by Dr. Schultz to obtain clearance prior to surgery - will obtain copy.

## 2024-05-14 NOTE — H&P PST ADULT - ADDITIONAL PE
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
Mallampati - II/IV

## 2024-05-14 NOTE — H&P PST ADULT - ASSESSMENT
29 yrs old female diagnosed with abnormally enlarge heterogenous solid and cystic appearance of left ovary/fallopian tube highly suspicious of carcinoma presents for preop eval to have Ex Lap, resection of plevic mass, possible TREVON and BSO and debulking on 5/21/24.

## 2024-05-14 NOTE — H&P PST ADULT - HISTORY OF PRESENT ILLNESS
29 yrs old female with h/o abdominal pain one week ago and since the pain was excruciating, pt went to [facility] and had CT scan done on 5/6/24 which showed abnormally enlarge heterogenous solid and cystic appearance of left ovary/fallopian tube highly suspicious of carcinoma. Pt was referred to Dr. Schultz. Pt presents for preop eval to have Ex Lap, resection of plevic mass, possible TREVON and BSO and debulking on 5/21/24. 29 yrs old female with h/o abdominal pain one week ago and since the pain was excruciating, pt went to [facility] ER and had CT scan done on 5/6/24 which showed abnormally enlarge heterogenous solid and cystic appearance of left ovary/fallopian tube highly suspicious of carcinoma and right ovarian mass. Pt was referred to Dr. Schultz. Pt presents for preop eval to have Ex Lap, resection of plevic mass, possible TREVON and BSO and debulking on 5/21/24.

## 2024-05-16 PROBLEM — Z87.09 PERSONAL HISTORY OF OTHER DISEASES OF THE RESPIRATORY SYSTEM: Chronic | Status: ACTIVE | Noted: 2024-05-14

## 2024-05-16 PROBLEM — N39.0 URINARY TRACT INFECTION, SITE NOT SPECIFIED: Chronic | Status: ACTIVE | Noted: 2024-05-14

## 2024-05-16 PROBLEM — Z87.2 PERSONAL HISTORY OF DISEASES OF THE SKIN AND SUBCUTANEOUS TISSUE: Chronic | Status: ACTIVE | Noted: 2024-05-14

## 2024-05-20 ENCOUNTER — TRANSCRIPTION ENCOUNTER (OUTPATIENT)
Age: 29
End: 2024-05-20

## 2024-05-20 NOTE — ASU PATIENT PROFILE, ADULT - FALL HARM RISK - UNIVERSAL INTERVENTIONS
Bed in lowest position, wheels locked, appropriate side rails in place/Call bell, personal items and telephone in reach/Instruct patient to call for assistance before getting out of bed or chair/Non-slip footwear when patient is out of bed/[place] to call system/Physically safe environment - no spills, clutter or unnecessary equipment/Purposeful Proactive Rounding/Room/bathroom lighting operational, light cord in reach

## 2024-05-20 NOTE — ASU PATIENT PROFILE, ADULT - NSICDXPASTMEDICALHX_GEN_ALL_CORE_FT
PAST MEDICAL HISTORY:  H/O eczema     H/O pelvic mass     H/O upper respiratory infection     History of keloid of skin     Ovarian cystic mass     Urinary tract infection

## 2024-05-21 ENCOUNTER — RESULT REVIEW (OUTPATIENT)
Age: 29
End: 2024-05-21

## 2024-05-21 ENCOUNTER — INPATIENT (INPATIENT)
Facility: HOSPITAL | Age: 29
LOS: 3 days | Discharge: HOME | End: 2024-05-25
Attending: OBSTETRICS & GYNECOLOGY | Admitting: OBSTETRICS & GYNECOLOGY
Payer: COMMERCIAL

## 2024-05-21 ENCOUNTER — APPOINTMENT (OUTPATIENT)
Dept: GYNECOLOGIC ONCOLOGY | Facility: HOSPITAL | Age: 29
End: 2024-05-21

## 2024-05-21 ENCOUNTER — NON-APPOINTMENT (OUTPATIENT)
Age: 29
End: 2024-05-21

## 2024-05-21 VITALS
WEIGHT: 156.09 LBS | TEMPERATURE: 98 F | HEIGHT: 59 IN | SYSTOLIC BLOOD PRESSURE: 124 MMHG | HEART RATE: 99 BPM | OXYGEN SATURATION: 100 % | DIASTOLIC BLOOD PRESSURE: 92 MMHG | RESPIRATION RATE: 17 BRPM

## 2024-05-21 DIAGNOSIS — R97.1 ELEVATED CANCER ANTIGEN 125 [CA 125]: ICD-10-CM

## 2024-05-21 LAB — HCG UR QL: NEGATIVE — SIGNIFICANT CHANGE UP

## 2024-05-21 PROCEDURE — 88309 TISSUE EXAM BY PATHOLOGIST: CPT | Mod: 26

## 2024-05-21 PROCEDURE — 88341 IMHCHEM/IMCYTCHM EA ADD ANTB: CPT | Mod: 26

## 2024-05-21 PROCEDURE — 88342 IMHCHEM/IMCYTCHM 1ST ANTB: CPT | Mod: 26,XP

## 2024-05-21 PROCEDURE — 58951 RESECT OVARIAN MALIGNANCY: CPT

## 2024-05-21 PROCEDURE — 88342 IMHCHEM/IMCYTCHM 1ST ANTB: CPT | Mod: 26

## 2024-05-21 PROCEDURE — 88305 TISSUE EXAM BY PATHOLOGIST: CPT | Mod: 26,XP

## 2024-05-21 PROCEDURE — 88302 TISSUE EXAM BY PATHOLOGIST: CPT | Mod: 26

## 2024-05-21 PROCEDURE — 88307 TISSUE EXAM BY PATHOLOGIST: CPT | Mod: 26

## 2024-05-21 PROCEDURE — 88341 IMHCHEM/IMCYTCHM EA ADD ANTB: CPT | Mod: 26,XP

## 2024-05-21 PROCEDURE — 88112 CYTOPATH CELL ENHANCE TECH: CPT | Mod: 26

## 2024-05-21 PROCEDURE — 88305 TISSUE EXAM BY PATHOLOGIST: CPT | Mod: 26

## 2024-05-21 DEVICE — LIGATING CLIPS WECK HORIZON LARGE (ORANGE) 24: Type: IMPLANTABLE DEVICE | Site: LEFT | Status: FUNCTIONAL

## 2024-05-21 DEVICE — SURGICEL POWDER 3 GRAMS: Type: IMPLANTABLE DEVICE | Site: LEFT | Status: FUNCTIONAL

## 2024-05-21 DEVICE — LIGATING CLIPS WECK HORIZON MEDIUM (BLUE) 24: Type: IMPLANTABLE DEVICE | Site: LEFT | Status: FUNCTIONAL

## 2024-05-21 DEVICE — STAPLER COVIDIEN ENDO GIA 60-3.8MM BLUE: Type: IMPLANTABLE DEVICE | Site: LEFT | Status: FUNCTIONAL

## 2024-05-21 DEVICE — SEPRAFILM 5 X 6": Type: IMPLANTABLE DEVICE | Site: LEFT | Status: FUNCTIONAL

## 2024-05-21 RX ORDER — ONDANSETRON 8 MG/1
4 TABLET, FILM COATED ORAL
Refills: 0 | Status: DISCONTINUED | OUTPATIENT
Start: 2024-05-21 | End: 2024-05-22

## 2024-05-21 RX ORDER — ACETAMINOPHEN 500 MG
1000 TABLET ORAL ONCE
Refills: 0 | Status: COMPLETED | OUTPATIENT
Start: 2024-05-22 | End: 2024-05-22

## 2024-05-21 RX ORDER — FAMOTIDINE 10 MG/ML
20 INJECTION INTRAVENOUS DAILY
Refills: 0 | Status: DISCONTINUED | OUTPATIENT
Start: 2024-05-21 | End: 2024-05-25

## 2024-05-21 RX ORDER — FENTANYL CITRATE 50 UG/ML
50 INJECTION INTRAVENOUS
Refills: 0 | Status: DISCONTINUED | OUTPATIENT
Start: 2024-05-21 | End: 2024-05-21

## 2024-05-21 RX ORDER — KETOROLAC TROMETHAMINE 30 MG/ML
30 SYRINGE (ML) INJECTION EVERY 6 HOURS
Refills: 0 | Status: DISCONTINUED | OUTPATIENT
Start: 2024-05-21 | End: 2024-05-22

## 2024-05-21 RX ORDER — HYDROMORPHONE HYDROCHLORIDE 2 MG/ML
0.5 INJECTION INTRAMUSCULAR; INTRAVENOUS; SUBCUTANEOUS
Refills: 0 | Status: DISCONTINUED | OUTPATIENT
Start: 2024-05-21 | End: 2024-05-21

## 2024-05-21 RX ORDER — SIMETHICONE 80 MG/1
80 TABLET, CHEWABLE ORAL EVERY 6 HOURS
Refills: 0 | Status: DISCONTINUED | OUTPATIENT
Start: 2024-05-21 | End: 2024-05-25

## 2024-05-21 RX ORDER — HEPARIN SODIUM 5000 [USP'U]/ML
5000 INJECTION INTRAVENOUS; SUBCUTANEOUS EVERY 8 HOURS
Refills: 0 | Status: DISCONTINUED | OUTPATIENT
Start: 2024-05-21 | End: 2024-05-25

## 2024-05-21 RX ORDER — OXYCODONE HYDROCHLORIDE 5 MG/1
5 TABLET ORAL EVERY 6 HOURS
Refills: 0 | Status: DISCONTINUED | OUTPATIENT
Start: 2024-05-21 | End: 2024-05-25

## 2024-05-21 RX ORDER — SENNA PLUS 8.6 MG/1
2 TABLET ORAL AT BEDTIME
Refills: 0 | Status: DISCONTINUED | OUTPATIENT
Start: 2024-05-21 | End: 2024-05-25

## 2024-05-21 RX ORDER — OXYCODONE HYDROCHLORIDE 5 MG/1
5 TABLET ORAL ONCE
Refills: 0 | Status: DISCONTINUED | OUTPATIENT
Start: 2024-05-21 | End: 2024-05-24

## 2024-05-21 RX ORDER — SODIUM CHLORIDE 9 MG/ML
1000 INJECTION, SOLUTION INTRAVENOUS
Refills: 0 | Status: DISCONTINUED | OUTPATIENT
Start: 2024-05-21 | End: 2024-05-24

## 2024-05-21 RX ORDER — KETOROLAC TROMETHAMINE 30 MG/ML
30 SYRINGE (ML) INJECTION ONCE
Refills: 0 | Status: DISCONTINUED | OUTPATIENT
Start: 2024-05-21 | End: 2024-05-22

## 2024-05-21 RX ORDER — HYDROMORPHONE HYDROCHLORIDE 2 MG/ML
0.25 INJECTION INTRAMUSCULAR; INTRAVENOUS; SUBCUTANEOUS
Refills: 0 | Status: DISCONTINUED | OUTPATIENT
Start: 2024-05-21 | End: 2024-05-22

## 2024-05-21 RX ADMIN — HYDROMORPHONE HYDROCHLORIDE 0.25 MILLIGRAM(S): 2 INJECTION INTRAMUSCULAR; INTRAVENOUS; SUBCUTANEOUS at 23:35

## 2024-05-21 RX ADMIN — SIMETHICONE 80 MILLIGRAM(S): 80 TABLET, CHEWABLE ORAL at 23:14

## 2024-05-21 RX ADMIN — Medication 30 MILLIGRAM(S): at 23:04

## 2024-05-21 RX ADMIN — HYDROMORPHONE HYDROCHLORIDE 0.25 MILLIGRAM(S): 2 INJECTION INTRAMUSCULAR; INTRAVENOUS; SUBCUTANEOUS at 22:59

## 2024-05-21 RX ADMIN — HYDROMORPHONE HYDROCHLORIDE 0.25 MILLIGRAM(S): 2 INJECTION INTRAMUSCULAR; INTRAVENOUS; SUBCUTANEOUS at 22:58

## 2024-05-21 RX ADMIN — SODIUM CHLORIDE 30 MILLILITER(S): 9 INJECTION, SOLUTION INTRAVENOUS at 14:37

## 2024-05-21 RX ADMIN — FENTANYL CITRATE 50 MICROGRAM(S): 50 INJECTION INTRAVENOUS at 22:30

## 2024-05-21 RX ADMIN — HYDROMORPHONE HYDROCHLORIDE 0.25 MILLIGRAM(S): 2 INJECTION INTRAMUSCULAR; INTRAVENOUS; SUBCUTANEOUS at 23:30

## 2024-05-21 RX ADMIN — HYDROMORPHONE HYDROCHLORIDE 0.25 MILLIGRAM(S): 2 INJECTION INTRAMUSCULAR; INTRAVENOUS; SUBCUTANEOUS at 22:34

## 2024-05-21 RX ADMIN — CHLORHEXIDINE GLUCONATE 1 APPLICATION(S): 213 SOLUTION TOPICAL at 14:37

## 2024-05-21 RX ADMIN — FENTANYL CITRATE 50 MICROGRAM(S): 50 INJECTION INTRAVENOUS at 21:57

## 2024-05-21 RX ADMIN — HYDROMORPHONE HYDROCHLORIDE 0.25 MILLIGRAM(S): 2 INJECTION INTRAMUSCULAR; INTRAVENOUS; SUBCUTANEOUS at 23:50

## 2024-05-21 NOTE — BRIEF OPERATIVE NOTE - OPERATION/FINDINGS
EUA: adnexal fullness bilaterally  Intraabdominal findings: Large left ovarian cyst with ovary measuring approximately 14cm with multiple loculations and adhesion to anterior abdominal wall. Smaller, ~6cm right ovarian cyst, also with multiple loculations. Uterus and fallopian tubes wnl. Small volume of ascites upon intraabdominal entry. No evidence of omental disease. Bowel inspected in its entirety, no gross evidence of disease. Appendix normal appearing.   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

## 2024-05-21 NOTE — CHART NOTE - NSCHARTNOTEFT_GEN_A_CORE
NOTE DELAYED DUE TO CLINICAL DUTIES    Patient seen and examined at bedside, recently post-op. Reports residual cough from a week ago, causing more abdominal pain. Denies CP, SOB, N/V, fevers, and chills.    Vital Signs Last 24 Hours  T(C): 36.5 (05-22-24 @ 01:28), Max: 36.8 (05-21-24 @ 13:46)  HR: 85 (05-22-24 @ 01:28) (68 - 99)  BP: 111/74 (05-22-24 @ 01:28) (96/56 - 124/92)  RR: 17 (05-22-24 @ 01:28) (11 - 21)  SpO2: 98% (05-22-24 @ 01:28) (96% - 100%)    I&O's Summary    21 May 2024 07:01  -  22 May 2024 04:27  --------------------------------------------------------  IN: 475 mL / OUT: 350 mL / NET: 125 mL        Physical Exam:  General: NAD  CV: NR, RR, S1, S2, no M/R/G  Lungs: CTA-B  Abdomen: Soft, appropriately tender, non-distended, +BS  Incision: midline vertical incision with Dermabond Prineo overlying; c/d/i  Ext: No pain or swelling        MEDICATIONS  (STANDING):  acetaminophen   IVPB .. 1000 milliGRAM(s) IV Intermittent once  famotidine    Tablet 20 milliGRAM(s) Oral daily  heparin   Injectable 5000 Unit(s) SubCutaneous every 8 hours  ketorolac   Injectable 30 milliGRAM(s) IV Push every 6 hours  lactated ringers. 1000 milliLiter(s) (125 mL/Hr) IV Continuous <Continuous>  senna 2 Tablet(s) Oral at bedtime  simethicone 80 milliGRAM(s) Chew every 6 hours    MEDICATIONS  (PRN):  HYDROmorphone  Injectable 0.25 milliGRAM(s) IV Push every 10 minutes PRN Moderate Pain (4 - 6)  oxyCODONE    IR 5 milliGRAM(s) Oral once PRN Mild Pain (1 - 3)  oxyCODONE    IR 5 milliGRAM(s) Oral every 6 hours PRN Severe Pain (7 - 10)      30yo s/p ex-lap, TREVON, BSO, pelvic and para-aortic LND, omentectomy, and appendectomy, recovering well in acute post-operative state.    Neuro: IV pain meds  CV: Hemodynamically stable  Pulm: Encourage oob/ambulation, incentive spirometer at bedside  GI: Advance diet as tolerated  : Salter in place  Heme: HSQ and SCDs for DVT PPX  ID: afebrile  Endo: no active issues  Dispo: continue routine post-op care      Jonas Reyez, PGY2

## 2024-05-21 NOTE — ASU PREOP CHECKLIST - INTERNAL PROSTHESES
right breast, object left after surgery as a kid/yes(specify) right chest? , object left after surgery as a kid/yes(specify)

## 2024-05-21 NOTE — ASU PREOP CHECKLIST - SELECT TESTS ORDERED
CBC/CMP/PT/PTT/INR/Type and Screen/Urinalysis/UCG/POCT Blood Glucose 83 at 1421/CBC/CMP/PT/PTT/INR/Type and Screen/Urinalysis/UCG/POCT Blood Glucose

## 2024-05-21 NOTE — ASU PREOP CHECKLIST - 1.
family has belongings /// Abdominal Hysterectomy process checklist form initiated in pre op and placed in chart

## 2024-05-21 NOTE — BRIEF OPERATIVE NOTE - NSICDXBRIEFPROCEDURE_GEN_ALL_CORE_FT
PROCEDURES:  Total abdominal hysterectomy with bilateral salpingo-oophorectomy (TREVON-BSO) and pelvic lymphadenectomy 21-May-2024 21:56:56  Huyen Short  Paraaortic node dissection 21-May-2024 21:57:07  Huyen Short  Appendectomy 21-May-2024 21:57:18  Huyen Short  Omentectomy 21-May-2024 21:58:09  Huyen Short

## 2024-05-21 NOTE — BRIEF OPERATIVE NOTE - SPECIMENS
left ovary and fallopian tube, right ovary and fallopian tube, uterus and cervix, anterior cervix, omentum, appendix, right and left pelvic lymph nodes, right and left paraortic lymph nodes, pelvic fluid , anterior abdominal wall peritoneum

## 2024-05-22 ENCOUNTER — TRANSCRIPTION ENCOUNTER (OUTPATIENT)
Age: 29
End: 2024-05-22

## 2024-05-22 DIAGNOSIS — Z98.890 OTHER SPECIFIED POSTPROCEDURAL STATES: ICD-10-CM

## 2024-05-22 LAB
ANION GAP SERPL CALC-SCNC: 13 MMOL/L — SIGNIFICANT CHANGE UP (ref 7–14)
BASOPHILS # BLD AUTO: 0.04 K/UL — SIGNIFICANT CHANGE UP (ref 0–0.2)
BASOPHILS NFR BLD AUTO: 0.2 % — SIGNIFICANT CHANGE UP (ref 0–2)
BUN SERPL-MCNC: 6 MG/DL — LOW (ref 7–23)
CALCIUM SERPL-MCNC: 8.6 MG/DL — SIGNIFICANT CHANGE UP (ref 8.4–10.5)
CHLORIDE SERPL-SCNC: 102 MMOL/L — SIGNIFICANT CHANGE UP (ref 98–107)
CO2 SERPL-SCNC: 22 MMOL/L — SIGNIFICANT CHANGE UP (ref 22–31)
CREAT SERPL-MCNC: 0.49 MG/DL — LOW (ref 0.5–1.3)
EGFR: 129 ML/MIN/1.73M2 — SIGNIFICANT CHANGE UP
EOSINOPHIL # BLD AUTO: 0 K/UL — SIGNIFICANT CHANGE UP (ref 0–0.5)
EOSINOPHIL NFR BLD AUTO: 0 % — SIGNIFICANT CHANGE UP (ref 0–6)
GLUCOSE BLDC GLUCOMTR-MCNC: 83 MG/DL — SIGNIFICANT CHANGE UP (ref 70–99)
GLUCOSE SERPL-MCNC: 121 MG/DL — HIGH (ref 70–99)
HCT VFR BLD CALC: 38.6 % — SIGNIFICANT CHANGE UP (ref 34.5–45)
HGB BLD-MCNC: 12.5 G/DL — SIGNIFICANT CHANGE UP (ref 11.5–15.5)
IANC: 13.88 K/UL — HIGH (ref 1.8–7.4)
IMM GRANULOCYTES NFR BLD AUTO: 1 % — HIGH (ref 0–0.9)
LYMPHOCYTES # BLD AUTO: 1.66 K/UL — SIGNIFICANT CHANGE UP (ref 1–3.3)
LYMPHOCYTES # BLD AUTO: 10 % — LOW (ref 13–44)
MAGNESIUM SERPL-MCNC: 2 MG/DL — SIGNIFICANT CHANGE UP (ref 1.6–2.6)
MCHC RBC-ENTMCNC: 27.2 PG — SIGNIFICANT CHANGE UP (ref 27–34)
MCHC RBC-ENTMCNC: 32.4 GM/DL — SIGNIFICANT CHANGE UP (ref 32–36)
MCV RBC AUTO: 83.9 FL — SIGNIFICANT CHANGE UP (ref 80–100)
MONOCYTES # BLD AUTO: 0.93 K/UL — HIGH (ref 0–0.9)
MONOCYTES NFR BLD AUTO: 5.6 % — SIGNIFICANT CHANGE UP (ref 2–14)
NEUTROPHILS # BLD AUTO: 13.88 K/UL — HIGH (ref 1.8–7.4)
NEUTROPHILS NFR BLD AUTO: 83.2 % — HIGH (ref 43–77)
NRBC # BLD: 0 /100 WBCS — SIGNIFICANT CHANGE UP (ref 0–0)
NRBC # FLD: 0 K/UL — SIGNIFICANT CHANGE UP (ref 0–0)
PHOSPHATE SERPL-MCNC: 3.1 MG/DL — SIGNIFICANT CHANGE UP (ref 2.5–4.5)
PLATELET # BLD AUTO: 466 K/UL — HIGH (ref 150–400)
POTASSIUM SERPL-MCNC: 4 MMOL/L — SIGNIFICANT CHANGE UP (ref 3.5–5.3)
POTASSIUM SERPL-SCNC: 4 MMOL/L — SIGNIFICANT CHANGE UP (ref 3.5–5.3)
RBC # BLD: 4.6 M/UL — SIGNIFICANT CHANGE UP (ref 3.8–5.2)
RBC # FLD: 12.5 % — SIGNIFICANT CHANGE UP (ref 10.3–14.5)
SODIUM SERPL-SCNC: 137 MMOL/L — SIGNIFICANT CHANGE UP (ref 135–145)
WBC # BLD: 16.67 K/UL — HIGH (ref 3.8–10.5)
WBC # FLD AUTO: 16.67 K/UL — HIGH (ref 3.8–10.5)

## 2024-05-22 RX ORDER — BENZOCAINE AND MENTHOL 5; 1 G/100ML; G/100ML
2 LIQUID ORAL EVERY 6 HOURS
Refills: 0 | Status: DISCONTINUED | OUTPATIENT
Start: 2024-05-22 | End: 2024-05-25

## 2024-05-22 RX ORDER — APIXABAN 2.5 MG/1
1 TABLET, FILM COATED ORAL
Qty: 56 | Refills: 0
Start: 2024-05-22

## 2024-05-22 RX ORDER — ACETAMINOPHEN 500 MG
975 TABLET ORAL EVERY 6 HOURS
Refills: 0 | Status: DISCONTINUED | OUTPATIENT
Start: 2024-05-22 | End: 2024-05-23

## 2024-05-22 RX ADMIN — Medication 400 MILLIGRAM(S): at 01:40

## 2024-05-22 RX ADMIN — SIMETHICONE 80 MILLIGRAM(S): 80 TABLET, CHEWABLE ORAL at 05:31

## 2024-05-22 RX ADMIN — Medication 30 MILLIGRAM(S): at 02:40

## 2024-05-22 RX ADMIN — HEPARIN SODIUM 5000 UNIT(S): 5000 INJECTION INTRAVENOUS; SUBCUTANEOUS at 01:40

## 2024-05-22 RX ADMIN — OXYCODONE HYDROCHLORIDE 5 MILLIGRAM(S): 5 TABLET ORAL at 11:18

## 2024-05-22 RX ADMIN — OXYCODONE HYDROCHLORIDE 5 MILLIGRAM(S): 5 TABLET ORAL at 12:15

## 2024-05-22 RX ADMIN — Medication 975 MILLIGRAM(S): at 14:12

## 2024-05-22 RX ADMIN — SIMETHICONE 80 MILLIGRAM(S): 80 TABLET, CHEWABLE ORAL at 17:13

## 2024-05-22 RX ADMIN — Medication 30 MILLIGRAM(S): at 18:02

## 2024-05-22 RX ADMIN — Medication 30 MILLIGRAM(S): at 06:32

## 2024-05-22 RX ADMIN — Medication 975 MILLIGRAM(S): at 20:05

## 2024-05-22 RX ADMIN — SIMETHICONE 80 MILLIGRAM(S): 80 TABLET, CHEWABLE ORAL at 12:06

## 2024-05-22 RX ADMIN — Medication 30 MILLIGRAM(S): at 13:00

## 2024-05-22 RX ADMIN — HEPARIN SODIUM 5000 UNIT(S): 5000 INJECTION INTRAVENOUS; SUBCUTANEOUS at 05:32

## 2024-05-22 RX ADMIN — Medication 975 MILLIGRAM(S): at 21:05

## 2024-05-22 RX ADMIN — SENNA PLUS 2 TABLET(S): 8.6 TABLET ORAL at 21:47

## 2024-05-22 RX ADMIN — HEPARIN SODIUM 5000 UNIT(S): 5000 INJECTION INTRAVENOUS; SUBCUTANEOUS at 14:13

## 2024-05-22 RX ADMIN — SODIUM CHLORIDE 125 MILLILITER(S): 9 INJECTION, SOLUTION INTRAVENOUS at 08:24

## 2024-05-22 RX ADMIN — Medication 1000 MILLIGRAM(S): at 09:00

## 2024-05-22 RX ADMIN — FAMOTIDINE 20 MILLIGRAM(S): 10 INJECTION INTRAVENOUS at 12:06

## 2024-05-22 RX ADMIN — SIMETHICONE 80 MILLIGRAM(S): 80 TABLET, CHEWABLE ORAL at 23:24

## 2024-05-22 RX ADMIN — SODIUM CHLORIDE 125 MILLILITER(S): 9 INJECTION, SOLUTION INTRAVENOUS at 18:09

## 2024-05-22 RX ADMIN — Medication 975 MILLIGRAM(S): at 15:00

## 2024-05-22 RX ADMIN — OXYCODONE HYDROCHLORIDE 5 MILLIGRAM(S): 5 TABLET ORAL at 17:10

## 2024-05-22 RX ADMIN — Medication 30 MILLIGRAM(S): at 01:40

## 2024-05-22 RX ADMIN — Medication 30 MILLIGRAM(S): at 05:32

## 2024-05-22 RX ADMIN — BENZOCAINE AND MENTHOL 2 LOZENGE: 5; 1 LIQUID ORAL at 23:25

## 2024-05-22 RX ADMIN — Medication 30 MILLIGRAM(S): at 23:22

## 2024-05-22 RX ADMIN — Medication 30 MILLIGRAM(S): at 12:07

## 2024-05-22 RX ADMIN — OXYCODONE HYDROCHLORIDE 5 MILLIGRAM(S): 5 TABLET ORAL at 18:00

## 2024-05-22 RX ADMIN — Medication 30 MILLIGRAM(S): at 18:55

## 2024-05-22 RX ADMIN — Medication 400 MILLIGRAM(S): at 08:09

## 2024-05-22 RX ADMIN — OXYCODONE HYDROCHLORIDE 5 MILLIGRAM(S): 5 TABLET ORAL at 23:49

## 2024-05-22 RX ADMIN — Medication 1000 MILLIGRAM(S): at 02:40

## 2024-05-22 RX ADMIN — HEPARIN SODIUM 5000 UNIT(S): 5000 INJECTION INTRAVENOUS; SUBCUTANEOUS at 21:49

## 2024-05-22 RX ADMIN — Medication 30 MILLIGRAM(S): at 00:15

## 2024-05-22 NOTE — PROGRESS NOTE ADULT - ASSESSMENT
Assessment/Plan: 29y POD#1, s/p ex-lap, TREVON, BSO, PPALND, Omentectomy, and appendectomy. Frozen pathology concerning for carcinoma. Pt stable in the immediate post operative state.      Neuro: IV Tylenol and Toradol. Oxy PRN. Eval for transition to oral regimen today. S/P TAP blocks in the OR.   CV: Hemodynamically stable. F/U AM CBC   Pulm: Saturating well on RA. Increase incentive spirometry, at bedside.  GI: Advance diet as tolerated. Antiemetics PRN/   : Atkins in place. Adequate UOP. D/C atkins this morning.   FEN: LR@125. F/U AM BMP/Mg/Phos. Replete electrolytes as indicated.   Heme: Continue HSQ/Venodynes for DVT ppx. Increase OOB.  Plan to discharge home on Eliquis.   ID: Afebrile  Endo: No issues  Dispo: continue inpatient care    Faiza Serrano, PGY-3

## 2024-05-22 NOTE — CHART NOTE - NSCHARTNOTEFT_GEN_A_CORE
Patient seen and evaluated at bedside. States Toradol has helped with the pain.   Is still worried/afraid of coughing and the pain it may cause.   Denies shortness of breath.   Has not yet passed flatus.      Objective  T(C): 36.6 (05-22-24 @ 18:13), Max: 37.2 (05-22-24 @ 10:30)  HR: 74 (05-22-24 @ 18:13) (73 - 85)  BP: 102/60 (05-22-24 @ 18:13) (93/64 - 102/70)  RR: 17 (05-22-24 @ 18:13) (16 - 17)  SpO2: 98% (05-22-24 @ 18:13) (97% - 100%)      Gen: anxious appearing  Resp: taking shallow breaths  Abd: Soft, non distended, diffusely tender      acetaminophen     Tablet .. 975 milliGRAM(s) Oral every 6 hours  benzocaine/menthol Lozenge 2 Lozenge Oral every 6 hours  famotidine    Tablet 20 milliGRAM(s) Oral daily  heparin   Injectable 5000 Unit(s) SubCutaneous every 8 hours  ketorolac   Injectable 30 milliGRAM(s) IV Push every 6 hours  lactated ringers. 1000 milliLiter(s) IV Continuous <Continuous>  oxyCODONE    IR 5 milliGRAM(s) Oral once PRN  oxyCODONE    IR 5 milliGRAM(s) Oral every 6 hours PRN  senna 2 Tablet(s) Oral at bedtime  simethicone 80 milliGRAM(s) Chew every 6 hours      - repeat set of vitals are within normal lmits (HR 74, /50, SpO2 98%)  - pain improved with Toradol 30mg  - patient wishes to adjust abdominal binder as needed; informed patient that the binder is only for her comfort and she can take it off/put it on as desired  - gave patient pillow to hold when coughing for additional support/comfort  - encouraged continued IS use; able to get to 1000L  - encouraged simethicone, ambulation, and warm tea for flatus  - all questions answered      D/w Dr. Chula Reyez, PGY2

## 2024-05-22 NOTE — PATIENT PROFILE ADULT - HOW PATIENT ADDRESSED, PROFILE
Milena
Anemia    Bipolar 1 disorder    COPD (chronic obstructive pulmonary disease)    HLD (hyperlipidemia)    Parkinson's disease

## 2024-05-22 NOTE — DISCHARGE NOTE PROVIDER - NSDCFUADDINST_GEN_ALL_CORE_FT
Discharge Instructions:    1. Diet: continue with regular diet    2. Activity limited by:   - no running, heavy lifting, strenuous exercise,   - nothing in vagina (bath, swim, intercourse, douching) for 6 weeks    3. Medications:   - Senna to prevent constipation (please hold for loose stools)  - Ibuprofen 600mg every 6 hours as needed for pain  - Acetaminophen 975mg every 4 hours as needed for pain  - Oxycodone 5mg every 4 hours for severe pain     4. Follow-up appointment - follow up with Dr. Schultz at your scheduled follow up appointment on [address] [address] at 12:30 PM    5. Precautions:  - Call the office or go to the ED if you have any of the followin) Fever >100.4 that does not resolve  2) Intractable pain  3) Heavy bleeding - small amount of vaginal spotting is normal

## 2024-05-22 NOTE — CHART NOTE - NSCHARTNOTEFT_GEN_A_CORE
Pt seen at bedside. Sitting up in the chair. Pt reports uncontrolled pain attributed to the strong sensation that she needs to cough / clear her through. This is limited however by the pain felt at the level of the incision with any cough. Pt reports feeling distended with gas pressure and the desire to pass gas but she has not been able to. Currently taking full pain regimen with minimal relief. Ambulating through the hallways and moving as tolerated. No nausea, tolerating a regular diet. Voiding spontaneously. Denies lightheaded / dizzy sensations.          Objective:  T(F): 98.2 (05-22-24 @ 14:45), Max: 98.9 (05-22-24 @ 10:30)  HR: 77 (05-22-24 @ 14:45) (59 - 91)  BP: 93/64 (05-22-24 @ 14:45) (93/64 - 116/77)  RR: 17 (05-22-24 @ 14:45) (11 - 21)  SpO2: 98% (05-22-24 @ 14:45) (96% - 100%)  Wt(kg): --  I&O's Summary    21 May 2024 07:01  -  22 May 2024 07:00  --------------------------------------------------------  IN: 1445 mL / OUT: 900 mL / NET: 545 mL    22 May 2024 07:01  -  22 May 2024 17:26  --------------------------------------------------------  IN: 1580 mL / OUT: 650 mL / NET: 930 mL        MEDICATIONS  (STANDING):  acetaminophen     Tablet .. 975 milliGRAM(s) Oral every 6 hours  famotidine    Tablet 20 milliGRAM(s) Oral daily  heparin   Injectable 5000 Unit(s) SubCutaneous every 8 hours  ketorolac   Injectable 30 milliGRAM(s) IV Push every 6 hours  lactated ringers. 1000 milliLiter(s) (125 mL/Hr) IV Continuous <Continuous>  senna 2 Tablet(s) Oral at bedtime  simethicone 80 milliGRAM(s) Chew every 6 hours    MEDICATIONS  (PRN):  oxyCODONE    IR 5 milliGRAM(s) Oral once PRN Mild Pain (1 - 3)  oxyCODONE    IR 5 milliGRAM(s) Oral every 6 hours PRN Severe Pain (7 - 10)      Physical Exam:  Constitutional: NAD, A+O x3  CV: RR S1S2 no m/r/g. IS at bedside. Clear phlegm produced   Lungs: CTA b/l, good air flow b/l  Abdomen: Tense while coughing, soft on relaxation. No guarding or rebound. Negative peritoneal signs.   Incision: Vertical midline incision - clean, dry, intact with ABD in place.   Extremities: no lower extremity edema or calf tenderness bilaterally; venodynes in place    LABS:  05-22    137    |  102    |  6<L>   ----------------------------<  121<H>  4.0     |  22     |  0.49<L>    Ca    8.6        22 May 2024 05:45  Phos  3.1       05-22  Mg     2.00      05-22      Urinalysis Basic - ( 22 May 2024 05:45 )  Color: x / Appearance: x / SG: x / pH: x  Gluc: 121 mg/dL / Ketone: x  / Bili: x / Urobili: x   Blood: x / Protein: x / Nitrite: x   Leuk Esterase: x / RBC: x / WBC x   Sq Epi: x / Non Sq Epi: x / Bacteria: x        Assessment/Plan: 29y POD#1, s/p ex-lap, TREVON, BSO, PPALND, Omentectomy, and appendectomy. Frozen pathology concerning for carcinoma. Pt with incisional pain worse with coughing.     Neuro: IV Tylenol and Toradol. Oxy PRN, to get second dose of oxy . S/P TAP blocks in the OR.   CV: Hemodynamically stable. Hct trend stable. F/U AM CBC    Pulm: Saturating well on RA. Increase incentive spirometry, at bedside. Unable to fully cough 2/2 to incisional pain, consider cough suppressant  GI: Advance diet as tolerated. Antiemetics PRN   : Voiding spontaneously.   FEN: SLIV F/U AM BMP/Mg/Phos. Replete electrolytes as indicated.   Heme: Continue HSQ/Venodynes for DVT ppx. Increase OOB.  Plan to discharge home on Eliquis.   ID: Afebrile  Endo: No issues  Dispo: continue inpatient care    Faiza Serrano, PGY-3 Pt seen at bedside. Sitting up in the chair. Pt reports uncontrolled pain attributed to the strong sensation that she needs to cough / clear her through. This is limited however by the pain felt at the level of the incision with any cough. Pt reports feeling distended with gas pressure and the desire to pass gas but she has not been able to. Currently taking full pain regimen with minimal relief. Ambulating through the hallways and moving as tolerated. No nausea, tolerating a regular diet. Voiding spontaneously. Denies lightheaded / dizzy sensations.          Objective:  T(F): 98.2 (05-22-24 @ 14:45), Max: 98.9 (05-22-24 @ 10:30)  HR: 77 (05-22-24 @ 14:45) (59 - 91)  BP: 93/64 (05-22-24 @ 14:45) (93/64 - 116/77)  RR: 17 (05-22-24 @ 14:45) (11 - 21)  SpO2: 98% (05-22-24 @ 14:45) (96% - 100%)  Wt(kg): --  I&O's Summary    21 May 2024 07:01  -  22 May 2024 07:00  --------------------------------------------------------  IN: 1445 mL / OUT: 900 mL / NET: 545 mL    22 May 2024 07:01  -  22 May 2024 17:26  --------------------------------------------------------  IN: 1580 mL / OUT: 650 mL / NET: 930 mL        MEDICATIONS  (STANDING):  acetaminophen     Tablet .. 975 milliGRAM(s) Oral every 6 hours  famotidine    Tablet 20 milliGRAM(s) Oral daily  heparin   Injectable 5000 Unit(s) SubCutaneous every 8 hours  ketorolac   Injectable 30 milliGRAM(s) IV Push every 6 hours  lactated ringers. 1000 milliLiter(s) (125 mL/Hr) IV Continuous <Continuous>  senna 2 Tablet(s) Oral at bedtime  simethicone 80 milliGRAM(s) Chew every 6 hours    MEDICATIONS  (PRN):  oxyCODONE    IR 5 milliGRAM(s) Oral once PRN Mild Pain (1 - 3)  oxyCODONE    IR 5 milliGRAM(s) Oral every 6 hours PRN Severe Pain (7 - 10)      Physical Exam:  Constitutional: NAD, A+O x3  CV: RR S1S2 no m/r/g. IS at bedside. Clear phlegm produced   Lungs: CTA b/l, good air flow b/l  Abdomen: Tense while coughing, soft on relaxation. No guarding or rebound. Negative peritoneal signs.   Incision: Vertical midline incision - clean, dry, intact with ABD in place.   Extremities: no lower extremity edema or calf tenderness bilaterally; venodynes in place    LABS:  05-22    137    |  102    |  6<L>   ----------------------------<  121<H>  4.0     |  22     |  0.49<L>    Ca    8.6        22 May 2024 05:45  Phos  3.1       05-22  Mg     2.00      05-22      Urinalysis Basic - ( 22 May 2024 05:45 )  Color: x / Appearance: x / SG: x / pH: x  Gluc: 121 mg/dL / Ketone: x  / Bili: x / Urobili: x   Blood: x / Protein: x / Nitrite: x   Leuk Esterase: x / RBC: x / WBC x   Sq Epi: x / Non Sq Epi: x / Bacteria: x        Assessment/Plan: 29y POD#1, s/p ex-lap, TREVON, BSO, PPALND, Omentectomy, and appendectomy. Frozen pathology concerning for carcinoma. Pt with incisional pain worse with coughing.     Neuro: IV Tylenol and Toradol. Oxy PRN, to get second dose of oxy . S/P TAP blocks in the OR.   CV: Hemodynamically stable. Hct trend stable. F/U AM CBC    Pulm: Saturating well on RA. Increase incentive spirometry, at bedside. Unable to fully cough 2/2 to incisional pain, consider cough suppressant  GI: Advance diet as tolerated. Antiemetics PRN   : Voiding spontaneously.   FEN: SLIV F/U AM BMP/Mg/Phos. Replete electrolytes as indicated.   Heme: Continue HSQ/Venodynes for DVT ppx. Increase OOB.  Plan to discharge home on Eliquis.   ID: Afebrile  Endo: No issues  Dispo: continue inpatient care    Faiza Serrano, PGY-3    Addendum: Pt reassessed ~550p after oxy 5mg. Continues to report diffuse abdominal discomfort that is not relieved by positional changes. States that toradol was more effective for pain relief earlier today. Denies any dizziness, lightheadedness, CP, SOB, nausea, vomiting. On exam, abdomen is softly distended with diffuse abdominal tenderness, no rebound/guarding/rigidity.   - will check repeat set of VS  - Will give Toradol 30mg IVP and reassess pain in 45min    D/w Dr. Alta Short, PGY-4

## 2024-05-22 NOTE — DISCHARGE NOTE PROVIDER - NSDCFUSCHEDAPPT_GEN_ALL_CORE_FT
[facility]  [facility] [address]  Scheduled Appointment: 06/03/2024     [facility]  [facility] [address]  Scheduled Appointment: 06/21/2024    [facility]  PULMMED [address] [facility]  Scheduled Appointment: 06/28/2024

## 2024-05-22 NOTE — DISCHARGE NOTE PROVIDER - NSDCMRMEDTOKEN_GEN_ALL_CORE_FT
amoxicillin-clavulanate 875 mg-125 mg oral tablet: 1 tab(s) orally 2 times a day x 3 days, last dose start date 05/13/24  apixaban 2.5 mg oral tablet: 1 tab(s) orally 2 times a day  Bactrim  mg-160 mg oral tablet: 1 tab(s) orally 2 times a day 05/06-05/11/2024; last dose 05/11/24  benzonatate 200 mg oral capsule: 1 cap(s) orally 3 times a day  ibuprofen 600 mg oral tablet: 1 tab(s) orally prn last dose 05/07/24   acetaminophen 325 mg oral tablet: 3 tab(s) orally every 6 hours  apixaban 2.5 mg oral tablet: 1 tab(s) orally 2 times a day  benzonatate 200 mg oral capsule: 1 cap(s) orally 3 times a day  ibuprofen 600 mg oral tablet: 1 tab(s) orally every 6 hours  oxyCODONE 5 mg oral tablet: 1 tab(s) orally every 6 hours as needed for Severe Pain (7 - 10) MDD: 4 tabs per day   acetaminophen 325 mg oral tablet: 3 tab(s) orally every 6 hours  apixaban 2.5 mg oral tablet: 1 tab(s) orally 2 times a day  benzonatate 200 mg oral capsule: 1 cap(s) orally 3 times a day  benzonatate 200 mg oral capsule: 1 cap(s) orally every 8 hours as needed for  cough  ibuprofen 600 mg oral tablet: 1 tab(s) orally every 6 hours  oxyCODONE 5 mg oral tablet: 1 tab(s) orally every 6 hours as needed for Severe Pain (7 - 10) MDD: 4 tabs per day   acetaminophen 325 mg oral tablet: 3 tab(s) orally every 6 hours  Albuterol (Eqv-ProAir HFA) 90 mcg/inh inhalation aerosol: 2 puff(s) inhaled every 8 hours as needed for  cough  apixaban 2.5 mg oral tablet: 1 tab(s) orally 2 times a day  benzonatate 200 mg oral capsule: 1 cap(s) orally 3 times a day  benzonatate 200 mg oral capsule: 1 cap(s) orally every 8 hours as needed for  cough  ciprofloxacin 500 mg oral tablet: 1 tab(s) orally every 12 hours  ibuprofen 600 mg oral tablet: 1 tab(s) orally every 6 hours  metroNIDAZOLE 500 mg oral tablet: 1 tab(s) orally every 12 hours  oxyCODONE 5 mg oral tablet: 1 tab(s) orally every 6 hours as needed for Severe Pain (7 - 10) MDD: 4 tabs per day  Symbicort 80 mcg-4.5 mcg/inh inhalation aerosol: 2 puff(s) inhaled 2 times a day as needed for  bronchospasm  triamcinolone 0.1% topical ointment: Apply topically to affected area 2 times a day as needed for  itching

## 2024-05-22 NOTE — DISCHARGE NOTE PROVIDER - CARE PROVIDER_API CALL
[facility]  Gynecologic Oncology  [address] [place] [id number]  Phone: [phone]  Fax: [phone]  Follow Up Time:

## 2024-05-22 NOTE — PATIENT PROFILE ADULT - FALL HARM RISK - HARM RISK INTERVENTIONS

## 2024-05-22 NOTE — DISCHARGE NOTE PROVIDER - NSDCCPTREATMENT_GEN_ALL_CORE_FT
PRINCIPAL PROCEDURE  Procedure: Total abdominal hysterectomy with bilateral salpingo-oophorectomy (TREVON-BSO) and pelvic lymphadenectomy  Findings and Treatment:       SECONDARY PROCEDURE  Procedure: Appendectomy  Findings and Treatment:     Procedure: Omentectomy  Findings and Treatment:     Procedure: Paraaortic node dissection  Findings and Treatment:

## 2024-05-22 NOTE — DISCHARGE NOTE PROVIDER - HOSPITAL COURSE
Patient presented for scheduled procedure. She underwent exploratory laparotomy, total abdominal hysterectomy, bilateral salpingoophorectomy, pelvic and paraortic lymph node dissection, omentectomy, and appendectomy. 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 .  Please see operative note for full details. Patient was transferred to recovery room in stable condition.    On POD#1, Salter catheter was discontinued and patient voided without issues. Patient's diet was advanced and she tolerated regular diet without issues. Labs drawn on POD#1 were stable compared to pre-op.      POD#2 routine post-operative care was performed.  No notable events.     On POD# patient was deemed stable for discharge as she was meeting postoperative milestones - tolerating regular diet, ambulating without difficulty, voiding, and pain was well controlled on oral medications. Throughout admission, patient received prophylactic anticoagulation and was provided Apixaban for outpatient anticoagulation.     Patient was instructed to have close follow up with Dr. Schultz Patient presented for scheduled procedure. She underwent exploratory laparotomy, total abdominal hysterectomy, bilateral salpingoophorectomy, pelvic and paraortic lymph node dissection, omentectomy, and appendectomy. 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 .  Please see operative note for full details. Patient was transferred to recovery room in stable condition.    On POD#1, Salter catheter was discontinued and patient voided without issues. Patient's diet was advanced and she tolerated regular diet without issues. Labs drawn on POD#1 were stable compared to pre-op.      POD#2 routine post-operative care was performed.  No notable events.     On POD# 4patient was deemed stable for discharge as she was meeting postoperative milestones - tolerating regular diet, ambulating without difficulty, voiding, and pain was well controlled on oral medications. Throughout admission, patient received prophylactic anticoagulation and was provided Apixaban for outpatient anticoagulation.     Patient was instructed to have close follow up with Dr. Schultz

## 2024-05-22 NOTE — PROGRESS NOTE ADULT - SUBJECTIVE AND OBJECTIVE BOX
Subjective:   Pt seen and examined at bedside. No events overnight. Pain appropriately controlled - responsive to current pain regimen. Not yet OOB. Tolerating bites of food. Gas pain improved with simethicone. No persistent nausea or emesis. Not yet passing flatus. Tolerating regular diet. Pt denies fever, chills, chest pain, SOB, lightheadedness, dizziness.      Objective:  T(F): 97.5 (05-22-24 @ 05:20), Max: 98.2 (05-21-24 @ 13:46)  HR: 59 (05-22-24 @ 05:20) (59 - 99)  BP: 109/64 (05-22-24 @ 05:20) (96/56 - 124/92)  RR: 17 (05-22-24 @ 05:20) (11 - 21)  SpO2: 99% (05-22-24 @ 05:20) (96% - 100%)  Wt(kg): --  I&O's Summary    21 May 2024 07:01  -  22 May 2024 07:00  --------------------------------------------------------  IN: 1445 mL / OUT: 900 mL / NET: 545 mL      CAPILLARY BLOOD GLUCOSE      POCT Blood Glucose.: 83 mg/dL (21 May 2024 14:21)      MEDICATIONS  (STANDING):  acetaminophen   IVPB .. 1000 milliGRAM(s) IV Intermittent once  famotidine    Tablet 20 milliGRAM(s) Oral daily  heparin   Injectable 5000 Unit(s) SubCutaneous every 8 hours  ketorolac   Injectable 30 milliGRAM(s) IV Push every 6 hours  lactated ringers. 1000 milliLiter(s) (125 mL/Hr) IV Continuous <Continuous>  senna 2 Tablet(s) Oral at bedtime  simethicone 80 milliGRAM(s) Chew every 6 hours    MEDICATIONS  (PRN):  HYDROmorphone  Injectable 0.25 milliGRAM(s) IV Push every 10 minutes PRN Moderate Pain (4 - 6)  oxyCODONE    IR 5 milliGRAM(s) Oral every 6 hours PRN Severe Pain (7 - 10)  oxyCODONE    IR 5 milliGRAM(s) Oral once PRN Mild Pain (1 - 3)      Physical Exam:  Constitutional: NAD, A+O x3  CV: RR S1S2 no m/r/g  Lungs: CTA b/l, good air flow b/l. IS within reach.   Abdomen: soft, softly-distended, appropriately-tender. no guarding, no rebound, normal bowel sounds  Incision: Midline vertical incision clean, dry, intact with ABD and abdominal binder overlaying  Extremities: no lower extremity edema or calf tenderness bilaterally; venodynes in place    LABS:

## 2024-05-22 NOTE — CHART NOTE - NSCHARTNOTEFT_GEN_A_CORE
Home Apixaban Note    Apixaban Rx sent to [facility] using the Free Coupon. The Apixaban was picked up by me and given to the pt at bedside. Medication instructions reviewed.  The  and Gyn Onc Team notified of above.

## 2024-05-23 LAB
ANION GAP SERPL CALC-SCNC: 15 MMOL/L — HIGH (ref 7–14)
BASOPHILS # BLD AUTO: 0.05 K/UL — SIGNIFICANT CHANGE UP (ref 0–0.2)
BASOPHILS NFR BLD AUTO: 0.4 % — SIGNIFICANT CHANGE UP (ref 0–2)
BUN SERPL-MCNC: 10 MG/DL — SIGNIFICANT CHANGE UP (ref 7–23)
CALCIUM SERPL-MCNC: 8.6 MG/DL — SIGNIFICANT CHANGE UP (ref 8.4–10.5)
CHLORIDE SERPL-SCNC: 103 MMOL/L — SIGNIFICANT CHANGE UP (ref 98–107)
CO2 SERPL-SCNC: 21 MMOL/L — LOW (ref 22–31)
CREAT SERPL-MCNC: 0.71 MG/DL — SIGNIFICANT CHANGE UP (ref 0.5–1.3)
EGFR: 117 ML/MIN/1.73M2 — SIGNIFICANT CHANGE UP
EOSINOPHIL # BLD AUTO: 0.05 K/UL — SIGNIFICANT CHANGE UP (ref 0–0.5)
EOSINOPHIL NFR BLD AUTO: 0.4 % — SIGNIFICANT CHANGE UP (ref 0–6)
GLUCOSE SERPL-MCNC: 109 MG/DL — HIGH (ref 70–99)
HCT VFR BLD CALC: 34.6 % — SIGNIFICANT CHANGE UP (ref 34.5–45)
HGB BLD-MCNC: 11.1 G/DL — LOW (ref 11.5–15.5)
IANC: 8.47 K/UL — HIGH (ref 1.8–7.4)
IMM GRANULOCYTES NFR BLD AUTO: 1.5 % — HIGH (ref 0–0.9)
LYMPHOCYTES # BLD AUTO: 2.65 K/UL — SIGNIFICANT CHANGE UP (ref 1–3.3)
LYMPHOCYTES # BLD AUTO: 21.6 % — SIGNIFICANT CHANGE UP (ref 13–44)
MAGNESIUM SERPL-MCNC: 2.1 MG/DL — SIGNIFICANT CHANGE UP (ref 1.6–2.6)
MCHC RBC-ENTMCNC: 27.5 PG — SIGNIFICANT CHANGE UP (ref 27–34)
MCHC RBC-ENTMCNC: 32.1 GM/DL — SIGNIFICANT CHANGE UP (ref 32–36)
MCV RBC AUTO: 85.6 FL — SIGNIFICANT CHANGE UP (ref 80–100)
MONOCYTES # BLD AUTO: 0.89 K/UL — SIGNIFICANT CHANGE UP (ref 0–0.9)
MONOCYTES NFR BLD AUTO: 7.2 % — SIGNIFICANT CHANGE UP (ref 2–14)
NEUTROPHILS # BLD AUTO: 8.47 K/UL — HIGH (ref 1.8–7.4)
NEUTROPHILS NFR BLD AUTO: 68.9 % — SIGNIFICANT CHANGE UP (ref 43–77)
NRBC # BLD: 0 /100 WBCS — SIGNIFICANT CHANGE UP (ref 0–0)
NRBC # FLD: 0 K/UL — SIGNIFICANT CHANGE UP (ref 0–0)
PHOSPHATE SERPL-MCNC: 2.6 MG/DL — SIGNIFICANT CHANGE UP (ref 2.5–4.5)
PLATELET # BLD AUTO: 482 K/UL — HIGH (ref 150–400)
POTASSIUM SERPL-MCNC: 3.9 MMOL/L — SIGNIFICANT CHANGE UP (ref 3.5–5.3)
POTASSIUM SERPL-SCNC: 3.9 MMOL/L — SIGNIFICANT CHANGE UP (ref 3.5–5.3)
RBC # BLD: 4.04 M/UL — SIGNIFICANT CHANGE UP (ref 3.8–5.2)
RBC # FLD: 12.8 % — SIGNIFICANT CHANGE UP (ref 10.3–14.5)
SODIUM SERPL-SCNC: 139 MMOL/L — SIGNIFICANT CHANGE UP (ref 135–145)
WBC # BLD: 12.29 K/UL — HIGH (ref 3.8–10.5)
WBC # FLD AUTO: 12.29 K/UL — HIGH (ref 3.8–10.5)

## 2024-05-23 RX ORDER — HYDROMORPHONE HYDROCHLORIDE 2 MG/ML
0.5 INJECTION INTRAMUSCULAR; INTRAVENOUS; SUBCUTANEOUS ONCE
Refills: 0 | Status: DISCONTINUED | OUTPATIENT
Start: 2024-05-23 | End: 2024-05-23

## 2024-05-23 RX ORDER — DIPHENHYDRAMINE HCL 50 MG
25 CAPSULE ORAL ONCE
Refills: 0 | Status: COMPLETED | OUTPATIENT
Start: 2024-05-23 | End: 2024-05-23

## 2024-05-23 RX ORDER — ONDANSETRON 8 MG/1
4 TABLET, FILM COATED ORAL EVERY 6 HOURS
Refills: 0 | Status: DISCONTINUED | OUTPATIENT
Start: 2024-05-23 | End: 2024-05-25

## 2024-05-23 RX ORDER — KETOROLAC TROMETHAMINE 30 MG/ML
15 SYRINGE (ML) INJECTION EVERY 6 HOURS
Refills: 0 | Status: DISCONTINUED | OUTPATIENT
Start: 2024-05-23 | End: 2024-05-23

## 2024-05-23 RX ORDER — POTASSIUM PHOSPHATE, MONOBASIC POTASSIUM PHOSPHATE, DIBASIC 236; 224 MG/ML; MG/ML
15 INJECTION, SOLUTION INTRAVENOUS ONCE
Refills: 0 | Status: COMPLETED | OUTPATIENT
Start: 2024-05-23 | End: 2024-05-23

## 2024-05-23 RX ORDER — ACETAMINOPHEN 500 MG
975 TABLET ORAL EVERY 6 HOURS
Refills: 0 | Status: DISCONTINUED | OUTPATIENT
Start: 2024-05-23 | End: 2024-05-25

## 2024-05-23 RX ORDER — ACETAMINOPHEN 500 MG
1000 TABLET ORAL ONCE
Refills: 0 | Status: COMPLETED | OUTPATIENT
Start: 2024-05-23 | End: 2024-05-23

## 2024-05-23 RX ORDER — ONDANSETRON 8 MG/1
4 TABLET, FILM COATED ORAL ONCE
Refills: 0 | Status: COMPLETED | OUTPATIENT
Start: 2024-05-23 | End: 2024-05-23

## 2024-05-23 RX ORDER — IBUPROFEN 200 MG
600 TABLET ORAL EVERY 6 HOURS
Refills: 0 | Status: DISCONTINUED | OUTPATIENT
Start: 2024-05-23 | End: 2024-05-25

## 2024-05-23 RX ORDER — ACETAMINOPHEN 500 MG
1000 TABLET ORAL EVERY 6 HOURS
Refills: 0 | Status: DISCONTINUED | OUTPATIENT
Start: 2024-05-23 | End: 2024-05-23

## 2024-05-23 RX ADMIN — SIMETHICONE 80 MILLIGRAM(S): 80 TABLET, CHEWABLE ORAL at 12:55

## 2024-05-23 RX ADMIN — Medication 600 MILLIGRAM(S): at 20:55

## 2024-05-23 RX ADMIN — FAMOTIDINE 20 MILLIGRAM(S): 10 INJECTION INTRAVENOUS at 12:55

## 2024-05-23 RX ADMIN — Medication 400 MILLIGRAM(S): at 09:30

## 2024-05-23 RX ADMIN — SODIUM CHLORIDE 125 MILLILITER(S): 9 INJECTION, SOLUTION INTRAVENOUS at 09:31

## 2024-05-23 RX ADMIN — Medication 400 MILLIGRAM(S): at 14:54

## 2024-05-23 RX ADMIN — HYDROMORPHONE HYDROCHLORIDE 0.5 MILLIGRAM(S): 2 INJECTION INTRAMUSCULAR; INTRAVENOUS; SUBCUTANEOUS at 06:19

## 2024-05-23 RX ADMIN — ONDANSETRON 4 MILLIGRAM(S): 8 TABLET, FILM COATED ORAL at 18:42

## 2024-05-23 RX ADMIN — HEPARIN SODIUM 5000 UNIT(S): 5000 INJECTION INTRAVENOUS; SUBCUTANEOUS at 14:55

## 2024-05-23 RX ADMIN — Medication 1000 MILLIGRAM(S): at 15:30

## 2024-05-23 RX ADMIN — SIMETHICONE 80 MILLIGRAM(S): 80 TABLET, CHEWABLE ORAL at 05:08

## 2024-05-23 RX ADMIN — OXYCODONE HYDROCHLORIDE 5 MILLIGRAM(S): 5 TABLET ORAL at 17:15

## 2024-05-23 RX ADMIN — ONDANSETRON 4 MILLIGRAM(S): 8 TABLET, FILM COATED ORAL at 06:38

## 2024-05-23 RX ADMIN — OXYCODONE HYDROCHLORIDE 5 MILLIGRAM(S): 5 TABLET ORAL at 00:49

## 2024-05-23 RX ADMIN — HEPARIN SODIUM 5000 UNIT(S): 5000 INJECTION INTRAVENOUS; SUBCUTANEOUS at 21:47

## 2024-05-23 RX ADMIN — Medication 975 MILLIGRAM(S): at 21:47

## 2024-05-23 RX ADMIN — Medication 975 MILLIGRAM(S): at 22:47

## 2024-05-23 RX ADMIN — Medication 15 MILLIGRAM(S): at 13:48

## 2024-05-23 RX ADMIN — SIMETHICONE 80 MILLIGRAM(S): 80 TABLET, CHEWABLE ORAL at 18:42

## 2024-05-23 RX ADMIN — Medication 15 MILLIGRAM(S): at 12:52

## 2024-05-23 RX ADMIN — Medication 1000 MILLIGRAM(S): at 03:41

## 2024-05-23 RX ADMIN — Medication 1000 MILLIGRAM(S): at 10:00

## 2024-05-23 RX ADMIN — POTASSIUM PHOSPHATE, MONOBASIC POTASSIUM PHOSPHATE, DIBASIC 62.5 MILLIMOLE(S): 236; 224 INJECTION, SOLUTION INTRAVENOUS at 12:58

## 2024-05-23 RX ADMIN — HEPARIN SODIUM 5000 UNIT(S): 5000 INJECTION INTRAVENOUS; SUBCUTANEOUS at 05:09

## 2024-05-23 RX ADMIN — HYDROMORPHONE HYDROCHLORIDE 0.5 MILLIGRAM(S): 2 INJECTION INTRAMUSCULAR; INTRAVENOUS; SUBCUTANEOUS at 05:19

## 2024-05-23 RX ADMIN — Medication 400 MILLIGRAM(S): at 02:41

## 2024-05-23 RX ADMIN — Medication 600 MILLIGRAM(S): at 19:55

## 2024-05-23 RX ADMIN — OXYCODONE HYDROCHLORIDE 5 MILLIGRAM(S): 5 TABLET ORAL at 16:23

## 2024-05-23 RX ADMIN — Medication 30 MILLIGRAM(S): at 00:22

## 2024-05-23 RX ADMIN — Medication 25 MILLIGRAM(S): at 00:30

## 2024-05-23 RX ADMIN — SENNA PLUS 2 TABLET(S): 8.6 TABLET ORAL at 21:47

## 2024-05-23 NOTE — PROGRESS NOTE ADULT - SUBJECTIVE AND OBJECTIVE BOX
Subjective:   Pt seen and examined at bedside. Patient with poorly controlled pain overnight. Was seen by night team twice and was given Dilaudid 0.5mg x1 at 5am to help with breakthrough pain. Shes says she feels better in terms of pain control after overnight interventions, but is starting to feel the onset of continued breakthrough pain. She had not voided overnight and night resident brought her to bathroom and she voided 400cc of dark urine. Due to nausea and pain she is not taking in much by PO including liquids. She understands the need to ambulate more today to help pass flatus she has not had as of yet.  Pt denies fever, chills, chest pain, SOB,  lightheadedness, dizziness.      Objective:    MEDICATIONS  (STANDING):  acetaminophen   IVPB .. 1000 milliGRAM(s) IV Intermittent every 6 hours  benzocaine/menthol Lozenge 2 Lozenge Oral every 6 hours  famotidine    Tablet 20 milliGRAM(s) Oral daily  heparin   Injectable 5000 Unit(s) SubCutaneous every 8 hours  lactated ringers. 1000 milliLiter(s) (125 mL/Hr) IV Continuous <Continuous>  senna 2 Tablet(s) Oral at bedtime  simethicone 80 milliGRAM(s) Chew every 6 hours    MEDICATIONS  (PRN):  oxyCODONE    IR 5 milliGRAM(s) Oral once PRN Mild Pain (1 - 3)  oxyCODONE    IR 5 milliGRAM(s) Oral every 6 hours PRN Severe Pain (7 - 10)      T(F): 97.3 (05-23-24 @ 05:07), Max: 98.9 (05-22-24 @ 10:30)  HR: 76 (05-23-24 @ 05:07) (73 - 97)  BP: 106/63 (05-23-24 @ 05:07) (90/53 - 129/77)  RR: 18 (05-23-24 @ 05:07) (16 - 20)  SpO2: 96% (05-23-24 @ 05:07) (96% - 100%)  Wt(kg): --    Physical Exam:  Constitutional: NAD, A+O x3  CV: RRR  Lungs: Clear to auscultation bilaterally  Abdomen: Soft, nondistended-appropriately tender, no guarding or rebound tenderness.   Incision: Midline vertical incision Clean, dry, intact with perineo in place, mild bruising at incision site  : Minimal bleeding on pad  Extremities: No lower extremity edema or calf tenderness bilaterally; venodynes in place    LABS:  05-22    137    |  102    |  6<L>   ----------------------------<  121<H>  4.0     |  22     |  0.49<L>    Ca    8.6        22 May 2024 05:45  Phos  3.1       05-22  Mg     2.00      05-22            Urinalysis Basic - ( 22 May 2024 05:45 )    Color: x / Appearance: x / SG: x / pH: x  Gluc: 121 mg/dL / Ketone: x  / Bili: x / Urobili: x   Blood: x / Protein: x / Nitrite: x   Leuk Esterase: x / RBC: x / WBC x   Sq Epi: x / Non Sq Epi: x / Bacteria: x      CAPILLARY BLOOD GLUCOSE          I&O's Summary    21 May 2024 07:01  -  22 May 2024 07:00  --------------------------------------------------------  IN: 1445 mL / OUT: 900 mL / NET: 545 mL    22 May 2024 07:01  -  23 May 2024 06:45  --------------------------------------------------------  IN: 3785 mL / OUT: 1050 mL / NET: 2735 mL

## 2024-05-23 NOTE — PROVIDER CONTACT NOTE (OTHER) - BACKGROUND
s/p ex lap TREVON/BSO, tumor debulking and omentectomy
s/p ex lap TREVON/BSO, tumor debulking and omentectomy

## 2024-05-23 NOTE — CHART NOTE - NSCHARTNOTEFT_GEN_A_CORE
R1 Gyn ONC Progress Note POD#2  HD#3    Subjective:   Pt seen and examined at bedside. Pain well controlled. Patient ambulating, passing flatus and tolerating regular diet. Pt complaining of gas pain in her substernal chest area. She reports standing and ambulation helps with relieving gas. Pt denies fever, chills, chest pain, SOB, nausea, vomiting, lightheadedness, dizziness.      Objective:  T(F): 98.2 (05-23-24 @ 14:55), Max: 98.2 (05-23-24 @ 14:55)  HR: 77 (05-23-24 @ 14:55) (74 - 97)  BP: 91/53 (05-23-24 @ 14:55) (90/53 - 129/77)  RR: 16 (05-23-24 @ 14:55) (16 - 20)  SpO2: 99% (05-23-24 @ 14:55) (96% - 99%)  Wt(kg): --  I&O's Summary    22 May 2024 07:01  -  23 May 2024 07:00  --------------------------------------------------------  IN: 3785 mL / OUT: 1050 mL / NET: 2735 mL    23 May 2024 07:01  -  23 May 2024 16:43  --------------------------------------------------------  IN: 0 mL / OUT: 250 mL / NET: -250 mL      CAPILLARY BLOOD GLUCOSE          MEDICATIONS  (STANDING):  acetaminophen     Tablet .. 975 milliGRAM(s) Oral every 6 hours  benzocaine/menthol Lozenge 2 Lozenge Oral every 6 hours  famotidine    Tablet 20 milliGRAM(s) Oral daily  heparin   Injectable 5000 Unit(s) SubCutaneous every 8 hours  ibuprofen  Tablet. 600 milliGRAM(s) Oral every 6 hours  lactated ringers. 1000 milliLiter(s) (125 mL/Hr) IV Continuous <Continuous>  senna 2 Tablet(s) Oral at bedtime  simethicone 80 milliGRAM(s) Chew every 6 hours    MEDICATIONS  (PRN):  oxyCODONE    IR 5 milliGRAM(s) Oral once PRN Mild Pain (1 - 3)  oxyCODONE    IR 5 milliGRAM(s) Oral every 6 hours PRN Severe Pain (7 - 10)      Physical Exam:  Constitutional: NAD, A+O x3  CV: RR S1S2 no m/r/g  Lungs: CTA b/l, good air flow b/l  Abdomen: soft, softly-distended, appropriately-tender. No guarding, no rebound, normal bowel sounds  Incision: clean, dry, intact  Extremities: no lower extremity edema or calf tenderness bilaterally; venodynes in place    LABS:               11.1   12.29 )-----------( 482      ( 05-23 @ 06:06 )             34.6                12.5   16.67 )-----------( 466      ( 05-22 @ 05:45 )             38.6       05-23    139    |  103    |  10     ----------------------------<  109<H>  3.9     |  21<L>  |  0.71   05-22    137    |  102    |  6<L>   ----------------------------<  121<H>  4.0     |  22     |  0.49<L>    Ca    8.6        23 May 2024 06:06  Ca    8.6        22 May 2024 05:45  Phos  2.6       05-23  Phos  3.1       05-22  Mg     2.10      05-23  Mg     2.00      05-22            Urinalysis Basic - ( 23 May 2024 06:06 )    Color: x / Appearance: x / SG: x / pH: x  Gluc: 109 mg/dL / Ketone: x  / Bili: x / Urobili: x   Blood: x / Protein: x / Nitrite: x   Leuk Esterase: x / RBC: x / WBC x   Sq Epi: x / Non Sq Epi: x / Bacteria: x        Assessment/Plan: 29y POD#2, s/p ex-lap, TREVON, BSO, PPALND, Omentectomy, and appendectomy . Frozen pathology concerning for carcinoma. Patient transioned to PO pain medication today and reports pain well controlled. Complains of gas pain, given simethicone and chewing gum. Pt encouraged to increased ambulation. Will continue to assess for symptomatic improvement as needed. Otherwise, pt recovering well postoperatively    Neuro: PO tylenol, motrin, Oxy PRN  S/P TAP blocks in the OR.   CV: Hemodynamically stable. Hct trend stable.   Pulm: Saturating well on RA. Increase incentive spirometry, at bedside. Unable to fully cough 2/2 to incisional pain, consider cough suppressant  GI: Reg diet. Antiemetics PRN   : Voiding spontaneously. Encourage increased PO liquids today for low UOP yesterday,   FEN: LR@125   Heme: Continue HSQ/Venodynes for DVT ppx. Increase OOB.  Plan to discharge home on Eliquis.   ID: Afebrile  Endo: No issues  Dispo: continue inpatient care; will monitor pain control today and UOP.     Senait Tejada PGY1 R1 Gyn ONC Progress Note POD#2  HD#3    Subjective:   Pt seen and examined at bedside. Pain well controlled. Patient ambulating, passing flatus and tolerating regular diet. Pt complaining of gas pain in her substernal chest area. She reports standing and ambulation helps with relieving gas. Pt denies fever, chills, chest pain, SOB, nausea, vomiting, lightheadedness, dizziness.      Objective:  T(F): 98.2 (05-23-24 @ 14:55), Max: 98.2 (05-23-24 @ 14:55)  HR: 77 (05-23-24 @ 14:55) (74 - 97)  BP: 91/53 (05-23-24 @ 14:55) (90/53 - 129/77)  RR: 16 (05-23-24 @ 14:55) (16 - 20)  SpO2: 99% (05-23-24 @ 14:55) (96% - 99%)  Wt(kg): --  I&O's Summary    22 May 2024 07:01  -  23 May 2024 07:00  --------------------------------------------------------  IN: 3785 mL / OUT: 1050 mL / NET: 2735 mL    23 May 2024 07:01  -  23 May 2024 16:43  --------------------------------------------------------  IN: 0 mL / OUT: 250 mL / NET: -250 mL      CAPILLARY BLOOD GLUCOSE          MEDICATIONS  (STANDING):  acetaminophen     Tablet .. 975 milliGRAM(s) Oral every 6 hours  benzocaine/menthol Lozenge 2 Lozenge Oral every 6 hours  famotidine    Tablet 20 milliGRAM(s) Oral daily  heparin   Injectable 5000 Unit(s) SubCutaneous every 8 hours  ibuprofen  Tablet. 600 milliGRAM(s) Oral every 6 hours  lactated ringers. 1000 milliLiter(s) (125 mL/Hr) IV Continuous <Continuous>  senna 2 Tablet(s) Oral at bedtime  simethicone 80 milliGRAM(s) Chew every 6 hours    MEDICATIONS  (PRN):  oxyCODONE    IR 5 milliGRAM(s) Oral once PRN Mild Pain (1 - 3)  oxyCODONE    IR 5 milliGRAM(s) Oral every 6 hours PRN Severe Pain (7 - 10)      Physical Exam:  Constitutional: NAD, A+O x3  CV: RR S1S2 no m/r/g  Lungs: CTA b/l, good air flow b/l  Abdomen: soft, softly-distended, appropriately-tender. No guarding, no rebound, normal bowel sounds  Incision: clean, dry, intact  Extremities: no lower extremity edema or calf tenderness bilaterally; venodynes in place    LABS:               11.1   12.29 )-----------( 482      ( 05-23 @ 06:06 )             34.6                12.5   16.67 )-----------( 466      ( 05-22 @ 05:45 )             38.6       05-23    139    |  103    |  10     ----------------------------<  109<H>  3.9     |  21<L>  |  0.71   05-22    137    |  102    |  6<L>   ----------------------------<  121<H>  4.0     |  22     |  0.49<L>    Ca    8.6        23 May 2024 06:06  Ca    8.6        22 May 2024 05:45  Phos  2.6       05-23  Phos  3.1       05-22  Mg     2.10      05-23  Mg     2.00      05-22            Urinalysis Basic - ( 23 May 2024 06:06 )    Color: x / Appearance: x / SG: x / pH: x  Gluc: 109 mg/dL / Ketone: x  / Bili: x / Urobili: x   Blood: x / Protein: x / Nitrite: x   Leuk Esterase: x / RBC: x / WBC x   Sq Epi: x / Non Sq Epi: x / Bacteria: x        Assessment/Plan: 29y POD#2, s/p ex-lap, TREVON, BSO, PPALND, Omentectomy, and appendectomy . Frozen pathology concerning for carcinoma. Patient transioned to PO pain medication today and reports pain well controlled. Complains of gas pain, given simethicone and chewing gum. Pt encouraged to increased ambulation. Will continue to assess for symptomatic improvement as needed. Otherwise, pt recovering well postoperatively    Neuro: continue PO tylenol, motrin, Oxy PRN for pain control  S/P TAP blocks in the OR.   CV: Hemodynamically stable. Hct trend stable.   Pulm: Saturating well on RA. Increase incentive spirometry, at bedside.   GI: Reg diet. Antiemetics PRN   : Voiding spontaneously.   FEN: LR@125   Heme: Continue HSQ/Venodynes for DVT ppx. Increase OOB.  Plan to discharge home on Eliquis.   ID: Afebrile  Endo: No issues  Dispo: continue inpatient care; will monitor pain control today and UOP.     Senait Tejada PGY1

## 2024-05-23 NOTE — PROVIDER CONTACT NOTE (OTHER) - ASSESSMENT
pt in severe pain despite given toradol. pt states pain is in her abdomen and radiating to her back. pt seems very uncomfortable.
Provider at bedside, pt remains uncomfortable after toradol and oxycodone administration. pt vitals are /77, HR 97, temp 97.8, O2 sat 96%, RR 20.

## 2024-05-23 NOTE — PROVIDER CONTACT NOTE (OTHER) - SITUATION
Provider at bedside, pt remains uncomfortable after toradol and oxycodone administration.
pt in severe pain despite given toradol.

## 2024-05-23 NOTE — PROVIDER CONTACT NOTE (OTHER) - REASON
Provider at bedside, pt remains uncomfortable after toradol and oxycodone administration
pt in severe pain despite given toradol

## 2024-05-23 NOTE — PROGRESS NOTE ADULT - ASSESSMENT
Assessment/Plan: 29y POD#2, s/p ex-lap, TREVON, BSO, PPALND, Omentectomy, and appendectomy . Frozen pathology concerning for carcinoma. Patient with poor pain control overnight and given Dilaudid in addition to standing pain medications overnight. States that the interventions did help but is feeling breakthrough pain this morning. Patient voided 400cc since 10a yesterday which was concentrated pt not taking much by PO. Plan for pt to ambulate more and to take in more by PO will access pain control today.      Neuro: IV Tylenol and Toradol. Oxy PRN,- given extra dose of oxy in addition to Dilaudid and benadryl overnight for pain.  . S/P TAP blocks in the OR.   CV: Hemodynamically stable. Hct trend stable.   -H/H 11/34.6 this AM denies sxs of anemia, VSS   Pulm: Saturating well on RA. Increase incentive spirometry, at bedside. Unable to fully cough 2/2 to incisional pain, consider cough suppressant  GI: Advance diet as tolerated. Antiemetics PRN   : Voiding spontaneously. Encourage increased PO liquids today for low UOP yesterday,   FEN: LR@125   -F/U AM BMP/Mg/Phos. Replete electrolytes as indicated.   Heme: Continue HSQ/Venodynes for DVT ppx. Increase OOB.  Plan to discharge home on Eliquis.   ID: Afebrile  Endo: No issues  Dispo: continue inpatient care; will monitor pain control today and UOP.     Raquel PGY2  seen with GYN Onc Team

## 2024-05-23 NOTE — PROVIDER CONTACT NOTE (OTHER) - ACTION/TREATMENT ORDERED:
Give pt Oxycodone 5mg and MD will come assess pt.
Administer 25mg of benadryl IVP and reassess in an hour. plan of care ongoing.

## 2024-05-24 RX ORDER — DIPHENHYDRAMINE HCL 50 MG
25 CAPSULE ORAL ONCE
Refills: 0 | Status: COMPLETED | OUTPATIENT
Start: 2024-05-24 | End: 2024-05-24

## 2024-05-24 RX ADMIN — SENNA PLUS 2 TABLET(S): 8.6 TABLET ORAL at 21:25

## 2024-05-24 RX ADMIN — Medication 975 MILLIGRAM(S): at 14:37

## 2024-05-24 RX ADMIN — BENZOCAINE AND MENTHOL 2 LOZENGE: 5; 1 LIQUID ORAL at 12:37

## 2024-05-24 RX ADMIN — Medication 975 MILLIGRAM(S): at 15:08

## 2024-05-24 RX ADMIN — HEPARIN SODIUM 5000 UNIT(S): 5000 INJECTION INTRAVENOUS; SUBCUTANEOUS at 06:44

## 2024-05-24 RX ADMIN — SIMETHICONE 80 MILLIGRAM(S): 80 TABLET, CHEWABLE ORAL at 18:04

## 2024-05-24 RX ADMIN — Medication 600 MILLIGRAM(S): at 07:44

## 2024-05-24 RX ADMIN — HEPARIN SODIUM 5000 UNIT(S): 5000 INJECTION INTRAVENOUS; SUBCUTANEOUS at 21:25

## 2024-05-24 RX ADMIN — SIMETHICONE 80 MILLIGRAM(S): 80 TABLET, CHEWABLE ORAL at 06:43

## 2024-05-24 RX ADMIN — HEPARIN SODIUM 5000 UNIT(S): 5000 INJECTION INTRAVENOUS; SUBCUTANEOUS at 14:38

## 2024-05-24 RX ADMIN — Medication 600 MILLIGRAM(S): at 18:04

## 2024-05-24 RX ADMIN — Medication 600 MILLIGRAM(S): at 13:48

## 2024-05-24 RX ADMIN — SIMETHICONE 80 MILLIGRAM(S): 80 TABLET, CHEWABLE ORAL at 12:37

## 2024-05-24 RX ADMIN — Medication 975 MILLIGRAM(S): at 22:25

## 2024-05-24 RX ADMIN — Medication 975 MILLIGRAM(S): at 04:10

## 2024-05-24 RX ADMIN — Medication 25 MILLIGRAM(S): at 03:10

## 2024-05-24 RX ADMIN — Medication 600 MILLIGRAM(S): at 06:43

## 2024-05-24 RX ADMIN — FAMOTIDINE 20 MILLIGRAM(S): 10 INJECTION INTRAVENOUS at 12:38

## 2024-05-24 RX ADMIN — Medication 600 MILLIGRAM(S): at 01:36

## 2024-05-24 RX ADMIN — Medication 600 MILLIGRAM(S): at 00:36

## 2024-05-24 RX ADMIN — Medication 600 MILLIGRAM(S): at 12:38

## 2024-05-24 RX ADMIN — Medication 975 MILLIGRAM(S): at 09:50

## 2024-05-24 RX ADMIN — Medication 600 MILLIGRAM(S): at 18:59

## 2024-05-24 RX ADMIN — Medication 975 MILLIGRAM(S): at 08:26

## 2024-05-24 RX ADMIN — Medication 975 MILLIGRAM(S): at 03:10

## 2024-05-24 RX ADMIN — Medication 975 MILLIGRAM(S): at 21:25

## 2024-05-24 RX ADMIN — SIMETHICONE 80 MILLIGRAM(S): 80 TABLET, CHEWABLE ORAL at 00:36

## 2024-05-24 NOTE — PROGRESS NOTE ADULT - PROBLEM SELECTOR PLAN 1
Gyn Onc Fellow Addendum:    Pt seen and examined at bedside. Agree with above.    VS reviewed  Labs reviewed    Hemodynamically stable   Continue current pain regimen  Reg diet   Salter removed, follow up trial of void   Encourage ambulation and IS use  Replete lytes prn  DVT ppx: HSQ, Enoch   Dispo: cont inpt care     Yesenia Holm MD
Gyn Onc Fellow Addendum:    Pt seen and examined at bedside. Agree with above.    VS reviewed  Labs reviewed    Hemodynamically stable   Continue PO pain regimen  Reg diet   Voiding spontaneously   Encourage ambulation and IS use  DVT ppx: HSQ, Venmargiees   Dispo: cont inpt care     Yesenia Holm MD

## 2024-05-24 NOTE — CHART NOTE - NSCHARTNOTEFT_GEN_A_CORE
Patient seen and examined at bedside, this afternoon patient still endorsing abdominal fullness, but is passing flatus. She is worried about the feeling of being bloated. She is also have some anxiety related to her diagnosis and what further testing she may need in the future. She did have some rleft lateral thigh numbness, but states has normal strength to BL extremities.     Vital Signs Last 24 Hours  T(C): 36.6 (05-24-24 @ 10:35), Max: 36.7 (05-23-24 @ 17:51)  HR: 78 (05-24-24 @ 14:48) (78 - 89)  BP: 107/73 (05-24-24 @ 14:48) (98/61 - 111/71)  RR: 15 (05-24-24 @ 14:48) (15 - 17)  SpO2: 99% (05-24-24 @ 14:48) (97% - 100%)    I&O's Summary    23 May 2024 07:01  -  24 May 2024 07:00  --------------------------------------------------------  IN: 3805 mL / OUT: 1150 mL / NET: 2655 mL    24 May 2024 07:01  -  24 May 2024 15:32  --------------------------------------------------------  IN: 0 mL / OUT: 1100 mL / NET: -1100 mL        Physical Exam:  General: NAD  Lungs: breathing comfortably on RA  Abdomen: Soft, appropriately Mildly distended  Extremities: Numbness to L vastus lateralis, preserved motor strength in BL legs    Labs:             11.1<L>  12.29<H> )-----------( 482<H>    ( 05-23 @ 06:06 )             34.6                12.5   16.67<H> )-----------( 466<H>    ( 05-22 @ 05:45 )             38.6         MEDICATIONS  (STANDING):  acetaminophen     Tablet .. 975 milliGRAM(s) Oral every 6 hours  benzocaine/menthol Lozenge 2 Lozenge Oral every 6 hours  famotidine    Tablet 20 milliGRAM(s) Oral daily  heparin   Injectable 5000 Unit(s) SubCutaneous every 8 hours  ibuprofen  Tablet. 600 milliGRAM(s) Oral every 6 hours  senna 2 Tablet(s) Oral at bedtime  simethicone 80 milliGRAM(s) Chew every 6 hours    MEDICATIONS  (PRN):  ondansetron Injectable 4 milliGRAM(s) IV Push every 6 hours PRN Nausea and/or Vomiting  oxyCODONE    IR 5 milliGRAM(s) Oral every 6 hours PRN Severe Pain (7 - 10)  oxyCODONE    IR 5 milliGRAM(s) Oral once PRN Mild Pain (1 - 3)      29y POD#3, s/p ex-lap, TREVON, BSO, PPALND, Omentectomy, and appendectomy . Frozen pathology concerning for carcinoma. Patient continues to have gas pain, but is tolerating PO and pain and has passed flatus throughout the day.     Neuro: Transitioned to PO pain medications - pain levels improving. S/P TAP blocks in the OR.   CV: Hemodynamically stable. Hct trend stable- as above  Pulm: Saturating well on RA. Increase incentive spirometry, at bedside.   GI: Regular diet. Antiemetics PRN   : Voiding spontaneously. 1.1L/5hrs; UOP has dramatically increased since yesterday.  FEN: Increased UOP/ PO intake. Now SLIV.   Heme: Continue HSQ/Venodynes for DVT ppx. Increase OOB.  Plan to discharge home on Eliquis.   ID: Afebrile  Endo: No issues  Dispo: continue inpatient care. Discharge planning      Fred Painter PGY 2

## 2024-05-24 NOTE — PROGRESS NOTE ADULT - SUBJECTIVE AND OBJECTIVE BOX
R2 Gyn ONC Progress Note POD#  HD#    Subjective:   Pt seen and examined at bedside. No events overnight. Pain well controlled. Patient ambulating. Passing flatus. Tolerating regular diet. Pt denies fever, chills, chest pain, SOB, nausea, vomiting, lightheadedness, dizziness.      Objective:  T(F): 97.9 (05-24-24 @ 01:42), Max: 98.2 (05-23-24 @ 14:55)  HR: 79 (05-24-24 @ 01:42) (77 - 89)  BP: 111/71 (05-24-24 @ 01:42) (91/53 - 111/71)  RR: 17 (05-24-24 @ 01:42) (16 - 18)  SpO2: 100% (05-24-24 @ 01:42) (98% - 100%)  Wt(kg): --  I&O's Summary    22 May 2024 07:01  -  23 May 2024 07:00  --------------------------------------------------------  IN: 3785 mL / OUT: 1050 mL / NET: 2735 mL    23 May 2024 07:01  -  24 May 2024 06:40  --------------------------------------------------------  IN: 3185 mL / OUT: 1150 mL / NET: 2035 mL      CAPILLARY BLOOD GLUCOSE          MEDICATIONS  (STANDING):  acetaminophen     Tablet .. 975 milliGRAM(s) Oral every 6 hours  benzocaine/menthol Lozenge 2 Lozenge Oral every 6 hours  famotidine    Tablet 20 milliGRAM(s) Oral daily  heparin   Injectable 5000 Unit(s) SubCutaneous every 8 hours  ibuprofen  Tablet. 600 milliGRAM(s) Oral every 6 hours  lactated ringers. 1000 milliLiter(s) (125 mL/Hr) IV Continuous <Continuous>  senna 2 Tablet(s) Oral at bedtime  simethicone 80 milliGRAM(s) Chew every 6 hours    MEDICATIONS  (PRN):  ondansetron Injectable 4 milliGRAM(s) IV Push every 6 hours PRN Nausea and/or Vomiting  oxyCODONE    IR 5 milliGRAM(s) Oral every 6 hours PRN Severe Pain (7 - 10)  oxyCODONE    IR 5 milliGRAM(s) Oral once PRN Mild Pain (1 - 3)      Physical Exam:  Constitutional: NAD, A+O x3  CV: RR S1S2 no m/r/g  Lungs: CTA b/l, good air flow b/l  Abdomen: soft, softly-distended, appropriately-tender. No guarding, no rebound, normal bowel sounds  Incision: clean, dry, intact  Extremities: no lower extremity edema or calf tenderness bilaterally; venodynes in place    LABS:               11.1   12.29 )-----------( 482      ( 05-23 @ 06:06 )             34.6                12.5   16.67 )-----------( 466      ( 05-22 @ 05:45 )             38.6       05-23    139    |  103    |  10     ----------------------------<  109<H>  3.9     |  21<L>  |  0.71     Ca    8.6        23 May 2024 06:06  Phos  2.6       05-23  Mg     2.10      05-23            Urinalysis Basic - ( 23 May 2024 06:06 )    Color: x / Appearance: x / SG: x / pH: x  Gluc: 109 mg/dL / Ketone: x  / Bili: x / Urobili: x   Blood: x / Protein: x / Nitrite: x   Leuk Esterase: x / RBC: x / WBC x   Sq Epi: x / Non Sq Epi: x / Bacteria: x               Subjective:   Pt seen and examined at bedside. Reports pain improved overnight. Able to get consistent sleep overnight with the assistance of Benadryl. Passing flatus. Ambulating up to chair / around room. Tolerating regular diet, reports increased PO intake. Denies emesis, fevers, chills, SOB, or CP.      Objective:  T(F): 97.9 (05-24-24 @ 01:42), Max: 98.2 (05-23-24 @ 14:55)  HR: 79 (05-24-24 @ 01:42) (77 - 89)  BP: 111/71 (05-24-24 @ 01:42) (91/53 - 111/71)  RR: 17 (05-24-24 @ 01:42) (16 - 18)  SpO2: 100% (05-24-24 @ 01:42) (98% - 100%)  Wt(kg): --  I&O's Summary    22 May 2024 07:01  -  23 May 2024 07:00  --------------------------------------------------------  IN: 3785 mL / OUT: 1050 mL / NET: 2735 mL    23 May 2024 07:01  -  24 May 2024 06:40  --------------------------------------------------------  IN: 3185 mL / OUT: 1150 mL / NET: 2035 mL      CAPILLARY BLOOD GLUCOSE          MEDICATIONS  (STANDING):  acetaminophen     Tablet .. 975 milliGRAM(s) Oral every 6 hours  benzocaine/menthol Lozenge 2 Lozenge Oral every 6 hours  famotidine    Tablet 20 milliGRAM(s) Oral daily  heparin   Injectable 5000 Unit(s) SubCutaneous every 8 hours  ibuprofen  Tablet. 600 milliGRAM(s) Oral every 6 hours  lactated ringers. 1000 milliLiter(s) (125 mL/Hr) IV Continuous <Continuous>  senna 2 Tablet(s) Oral at bedtime  simethicone 80 milliGRAM(s) Chew every 6 hours    MEDICATIONS  (PRN):  ondansetron Injectable 4 milliGRAM(s) IV Push every 6 hours PRN Nausea and/or Vomiting  oxyCODONE    IR 5 milliGRAM(s) Oral every 6 hours PRN Severe Pain (7 - 10)  oxyCODONE    IR 5 milliGRAM(s) Oral once PRN Mild Pain (1 - 3)      Physical Exam:  Constitutional: NAD, A+O x3  CV: RR S1S2 no m/r/g  Lungs: CTA b/l, good air flow b/l  Abdomen: soft, softly-distended, appropriately-tender. No guarding, no rebound, normal bowel sounds  Incision: Midline vertical incision - clean, dry, intact with abdominal binder in place.   Extremities: no lower extremity edema or calf tenderness bilaterally; venodynes in place    LABS:               11.1   12.29 )-----------( 482      ( 05-23 @ 06:06 )             34.6                12.5   16.67 )-----------( 466      ( 05-22 @ 05:45 )             38.6       05-23    139    |  103    |  10     ----------------------------<  109<H>  3.9     |  21<L>  |  0.71     Ca    8.6        23 May 2024 06:06  Phos  2.6       05-23  Mg     2.10      05-23            Urinalysis Basic - ( 23 May 2024 06:06 )    Color: x / Appearance: x / SG: x / pH: x  Gluc: 109 mg/dL / Ketone: x  / Bili: x / Urobili: x   Blood: x / Protein: x / Nitrite: x   Leuk Esterase: x / RBC: x / WBC x   Sq Epi: x / Non Sq Epi: x / Bacteria: x

## 2024-05-24 NOTE — PROGRESS NOTE ADULT - ASSESSMENT
29y POD#3, s/p ex-lap, TREVON, BSO, PPALND, Omentectomy, and appendectomy . Frozen pathology concerning for carcinoma. Vitals stable overnight. Patient meeting postoperative milestones    Neuro: IV Tylenol and Toradol. Oxy PRN,- given extra dose of oxy in addition to Dilaudid and benadryl overnight for pain.  S/P TAP blocks in the OR.   CV: Hemodynamically stable.  Pulm: Saturating well on RA. Increase incentive spirometry, at bedside.   GI: Regular diet. Antiemetics PRN   : Voiding spontaneously.   FEN: LR@125   Heme: Continue HSQ/Venodynes for DVT ppx. Increase OOB.  Plan to discharge home on Eliquis.   ID: Afebrile  Endo: No issues  Dispo: continue inpatient care. Discharge planning   29y POD#3, s/p ex-lap, TREVON, BSO, PPALND, Omentectomy, and appendectomy . Frozen pathology concerning for carcinoma. Vitals stable overnight. Patient meeting postoperative milestones, pain improved overnight.     Neuro: Transitioned to PO pain medications - pain levels improving. S/P TAP blocks in the OR.   CV: Hemodynamically stable. Hct trend stable- as above  Pulm: Saturating well on RA. Increase incentive spirometry, at bedside.   GI: Regular diet. Antiemetics PRN   : Voiding spontaneously.   FEN: Increased UOP/ PO intake. Will SLIV.   Heme: Continue HSQ/Venodynes for DVT ppx. Increase OOB.  Plan to discharge home on Eliquis.   ID: Afebrile  Endo: No issues  Dispo: continue inpatient care. Discharge planning    Faiza Serrano, PGY-3

## 2024-05-25 ENCOUNTER — TRANSCRIPTION ENCOUNTER (OUTPATIENT)
Age: 29
End: 2024-05-25

## 2024-05-25 VITALS
DIASTOLIC BLOOD PRESSURE: 67 MMHG | TEMPERATURE: 98 F | HEART RATE: 78 BPM | OXYGEN SATURATION: 99 % | RESPIRATION RATE: 15 BRPM | SYSTOLIC BLOOD PRESSURE: 112 MMHG

## 2024-05-25 RX ORDER — IBUPROFEN 200 MG
1 TABLET ORAL
Qty: 0 | Refills: 0 | DISCHARGE
Start: 2024-05-25

## 2024-05-25 RX ORDER — OXYCODONE HYDROCHLORIDE 5 MG/1
1 TABLET ORAL
Qty: 8 | Refills: 0
Start: 2024-05-25 | End: 2024-05-26

## 2024-05-25 RX ORDER — ACETAMINOPHEN 500 MG
3 TABLET ORAL
Qty: 0 | Refills: 0 | DISCHARGE
Start: 2024-05-25

## 2024-05-25 RX ORDER — IBUPROFEN 200 MG
1 TABLET ORAL
Refills: 0 | DISCHARGE

## 2024-05-25 RX ADMIN — BENZOCAINE AND MENTHOL 2 LOZENGE: 5; 1 LIQUID ORAL at 00:01

## 2024-05-25 RX ADMIN — Medication 600 MILLIGRAM(S): at 00:03

## 2024-05-25 RX ADMIN — Medication 975 MILLIGRAM(S): at 15:17

## 2024-05-25 RX ADMIN — SIMETHICONE 80 MILLIGRAM(S): 80 TABLET, CHEWABLE ORAL at 00:01

## 2024-05-25 RX ADMIN — Medication 600 MILLIGRAM(S): at 01:03

## 2024-05-25 RX ADMIN — Medication 600 MILLIGRAM(S): at 12:32

## 2024-05-25 RX ADMIN — FAMOTIDINE 20 MILLIGRAM(S): 10 INJECTION INTRAVENOUS at 12:32

## 2024-05-25 RX ADMIN — Medication 975 MILLIGRAM(S): at 14:17

## 2024-05-25 RX ADMIN — SIMETHICONE 80 MILLIGRAM(S): 80 TABLET, CHEWABLE ORAL at 06:03

## 2024-05-25 RX ADMIN — HEPARIN SODIUM 5000 UNIT(S): 5000 INJECTION INTRAVENOUS; SUBCUTANEOUS at 14:16

## 2024-05-25 RX ADMIN — Medication 975 MILLIGRAM(S): at 04:06

## 2024-05-25 RX ADMIN — HEPARIN SODIUM 5000 UNIT(S): 5000 INJECTION INTRAVENOUS; SUBCUTANEOUS at 06:02

## 2024-05-25 RX ADMIN — Medication 975 MILLIGRAM(S): at 10:20

## 2024-05-25 RX ADMIN — Medication 975 MILLIGRAM(S): at 09:21

## 2024-05-25 RX ADMIN — Medication 600 MILLIGRAM(S): at 06:03

## 2024-05-25 RX ADMIN — Medication 25 MILLIGRAM(S): at 00:01

## 2024-05-25 RX ADMIN — Medication 600 MILLIGRAM(S): at 13:13

## 2024-05-25 RX ADMIN — SIMETHICONE 80 MILLIGRAM(S): 80 TABLET, CHEWABLE ORAL at 12:32

## 2024-05-25 RX ADMIN — Medication 975 MILLIGRAM(S): at 05:06

## 2024-05-25 NOTE — DISCHARGE NOTE NURSING/CASE MANAGEMENT/SOCIAL WORK - NSDCPNINST_GEN_ALL_CORE
Call MD with any problems or questions. Notify MD for fever, inability to tolerate food or liquids or increased drainage from incision

## 2024-05-25 NOTE — PROGRESS NOTE ADULT - ATTENDING COMMENTS
Patient seen and examined, agree with gyn onc fellow and resident  POD#  Doing well  Routine postop care  Labs stable  Exam benign  Increase OOB, pain control
Patient seen and examined at bedside with team at 8am, agree with above note, including assessment and plan. Abdomen benign. Discussed today's plan of care with patient and her mother, all questions answered. Continue routine postop care., plan for discharge today, instructions reviewed.
Patient seen and examined, agree with gyn onc fellow and resident  POD#1  Doing well  Routine postop care  Labs stable  Exam benign  Pain control  Discussed operative findings in detail
patient is feeling better - some intermittent flatus  +distention  will continue PO intake, unless more sxs.

## 2024-05-25 NOTE — DISCHARGE NOTE NURSING/CASE MANAGEMENT/SOCIAL WORK - NSDCPEFALRISK_GEN_ALL_CORE
For information on Fall & Injury Prevention, visit: [email or web address] OR  [email or web address] OR  https://www.cdc.gov/steadi/patient.html

## 2024-05-25 NOTE — PROGRESS NOTE ADULT - ASSESSMENT
29y POD#4, s/p ex-lap, TREVON, BSO, PPALND, Omentectomy, and appendectomy . Frozen pathology concerning for carcinoma. Patient continues to have gas pain, but is tolerating PO and pain and has passed flatus throughout the night.     Neuro: On PO pain medications - pain levels improving. S/P TAP blocks in the OR.   CV: Hemodynamically stable. Hct trend stable- as above  Pulm: Saturating well on RA. Increase incentive spirometry, at bedside.   GI: Regular diet. Antiemetics PRN   : Voiding spontaneously. 60cc/hr overnight  FEN: Increased UOP/ PO intake. Now SLIV.   Heme: Continue HSQ/Venodynes for DVT ppx. Increase OOB.  Plan to discharge home on Eliquis.   ID: Afebrile  Endo: No issues  Dispo: continue inpatient care. Discharge planning      Fred Painter PGY 2.

## 2024-05-25 NOTE — PROGRESS NOTE ADULT - NSPROGADDITIONALINFOA_GEN_ALL_CORE
Fellow Addendum    Pt seen and examined at bedside. Agree with above.    VS reviewed  Labs reviewed    Hemodynamically stable.  Continue current pain regimen  Tolerating reg diet, passing flatus. Incision C/D/I  Voiding spontaneously  Encourage ambulation and IS use  Replete lytes prn  DVT ppx: HSQ, Venodynes   Dispo: cont inpt care     Cassy Quiros MD Fellow Addendum    Pt seen and examined at bedside. Agree with above.    VS reviewed  Labs reviewed    Hemodynamically stable.  Continue current pain regimen  Tolerating reg diet, passing flatus. Incision C/D/I  Voiding spontaneously  Encourage ambulation and IS use  Replete lytes prn  DVT ppx: HSQ, Venodynes   Dispo: Meeting all postop milestones. possible dc today vs tomorrow    Cassy Quiros MD

## 2024-05-25 NOTE — PROGRESS NOTE ADULT - SUBJECTIVE AND OBJECTIVE BOX
Subjective:   Pt seen and examined at bedside. No events overnight. Pain better controlled today. Passing flatus with some gas pain, which has improved. Tolerating regular diet. Pt denies fever, chills, chest pain, SOB, nausea, vomiting, lightheadedness, dizziness.      Objective:    MEDICATIONS  (STANDING):  acetaminophen     Tablet .. 975 milliGRAM(s) Oral every 6 hours  benzocaine/menthol Lozenge 2 Lozenge Oral every 6 hours  famotidine    Tablet 20 milliGRAM(s) Oral daily  heparin   Injectable 5000 Unit(s) SubCutaneous every 8 hours  ibuprofen  Tablet. 600 milliGRAM(s) Oral every 6 hours  senna 2 Tablet(s) Oral at bedtime  simethicone 80 milliGRAM(s) Chew every 6 hours    MEDICATIONS  (PRN):  ondansetron Injectable 4 milliGRAM(s) IV Push every 6 hours PRN Nausea and/or Vomiting  oxyCODONE    IR 5 milliGRAM(s) Oral every 6 hours PRN Severe Pain (7 - 10)      T(F): 98.1 (05-25-24 @ 01:30), Max: 99.6 (05-24-24 @ 21:29)  HR: 73 (05-25-24 @ 01:30) (73 - 86)  BP: 109/70 (05-25-24 @ 01:30) (98/61 - 111/74)  RR: 16 (05-25-24 @ 01:30) (15 - 17)  SpO2: 99% (05-25-24 @ 01:30) (97% - 100%)  Wt(kg): --    Physical Exam:  Constitutional: NAD, A+O x3  CV: RR S1S2 no m/r/g  Lungs: CTA b/l, good air flow b/l  Abdomen: soft, softly-distended, appropriately-tender. No guarding, no rebound, normal bowel sounds  Incision: Midline vertical incision - clean, dry, intact with abdominal binder in place.   Extremities: no lower extremity edema or calf tenderness bilaterally; venodynes in place      LABS:              Urinalysis Basic - ( 23 May 2024 06:06 )    Color: x / Appearance: x / SG: x / pH: x  Gluc: 109 mg/dL / Ketone: x  / Bili: x / Urobili: x   Blood: x / Protein: x / Nitrite: x   Leuk Esterase: x / RBC: x / WBC x   Sq Epi: x / Non Sq Epi: x / Bacteria: x      CAPILLARY BLOOD GLUCOSE          I&O's Summary    23 May 2024 07:01  -  24 May 2024 07:00  --------------------------------------------------------  IN: 3805 mL / OUT: 1150 mL / NET: 2655 mL    24 May 2024 07:01  -  25 May 2024 05:32  --------------------------------------------------------  IN: 120 mL / OUT: 1400 mL / NET: -1280 mL

## 2024-05-25 NOTE — DISCHARGE NOTE NURSING/CASE MANAGEMENT/SOCIAL WORK - PATIENT PORTAL LINK FT
You can access the FollowMyHealth Patient Portal offered by [facility] by registering at the following website: [email or web address]. By joining [facility]’s FollowMyHealth portal, you will also be able to view your health information using other applications (apps) compatible with our system.

## 2024-05-29 ENCOUNTER — NON-APPOINTMENT (OUTPATIENT)
Age: 29
End: 2024-05-29

## 2024-05-30 ENCOUNTER — NON-APPOINTMENT (OUTPATIENT)
Age: 29
End: 2024-05-30

## 2024-05-30 ENCOUNTER — INPATIENT (INPATIENT)
Facility: HOSPITAL | Age: 29
LOS: 11 days | Discharge: HOME | End: 2024-06-11
Attending: OBSTETRICS & GYNECOLOGY | Admitting: OBSTETRICS & GYNECOLOGY
Payer: COMMERCIAL

## 2024-05-30 VITALS
RESPIRATION RATE: 18 BRPM | TEMPERATURE: 98 F | HEART RATE: 104 BPM | DIASTOLIC BLOOD PRESSURE: 86 MMHG | WEIGHT: 154.1 LBS | HEIGHT: 59 IN | SYSTOLIC BLOOD PRESSURE: 109 MMHG | OXYGEN SATURATION: 99 %

## 2024-05-30 DIAGNOSIS — R50.9 FEVER, UNSPECIFIED: ICD-10-CM

## 2024-05-30 LAB
ADD ON TEST-SPECIMEN IN LAB: SIGNIFICANT CHANGE UP
ALBUMIN SERPL ELPH-MCNC: 4.2 G/DL — SIGNIFICANT CHANGE UP (ref 3.3–5)
ALP SERPL-CCNC: 173 U/L — HIGH (ref 40–120)
ALT FLD-CCNC: 163 U/L — HIGH (ref 4–33)
ANION GAP SERPL CALC-SCNC: 15 MMOL/L — HIGH (ref 7–14)
APPEARANCE UR: CLEAR — SIGNIFICANT CHANGE UP
APTT BLD: 36.6 SEC — HIGH (ref 24.5–35.6)
AST SERPL-CCNC: 181 U/L — HIGH (ref 4–32)
BACTERIA # UR AUTO: NEGATIVE /HPF — SIGNIFICANT CHANGE UP
BASE EXCESS BLDV CALC-SCNC: 0.1 MMOL/L — SIGNIFICANT CHANGE UP (ref -2–3)
BASOPHILS # BLD AUTO: 0.06 K/UL — SIGNIFICANT CHANGE UP (ref 0–0.2)
BASOPHILS NFR BLD AUTO: 0.3 % — SIGNIFICANT CHANGE UP (ref 0–2)
BILIRUB SERPL-MCNC: 0.6 MG/DL — SIGNIFICANT CHANGE UP (ref 0.2–1.2)
BILIRUB UR-MCNC: NEGATIVE — SIGNIFICANT CHANGE UP
BLOOD GAS VENOUS COMPREHENSIVE RESULT: SIGNIFICANT CHANGE UP
BUN SERPL-MCNC: 9 MG/DL — SIGNIFICANT CHANGE UP (ref 7–23)
CALCIUM SERPL-MCNC: 9.5 MG/DL — SIGNIFICANT CHANGE UP (ref 8.4–10.5)
CAST: 0 /LPF — SIGNIFICANT CHANGE UP (ref 0–4)
CHLORIDE BLDV-SCNC: 102 MMOL/L — SIGNIFICANT CHANGE UP (ref 96–108)
CHLORIDE SERPL-SCNC: 99 MMOL/L — SIGNIFICANT CHANGE UP (ref 98–107)
CO2 BLDV-SCNC: 26.7 MMOL/L — HIGH (ref 22–26)
CO2 SERPL-SCNC: 22 MMOL/L — SIGNIFICANT CHANGE UP (ref 22–31)
COLOR SPEC: YELLOW — SIGNIFICANT CHANGE UP
CREAT SERPL-MCNC: 0.77 MG/DL — SIGNIFICANT CHANGE UP (ref 0.5–1.3)
DIFF PNL FLD: ABNORMAL
EGFR: 106 ML/MIN/1.73M2 — SIGNIFICANT CHANGE UP
EOSINOPHIL # BLD AUTO: 0.02 K/UL — SIGNIFICANT CHANGE UP (ref 0–0.5)
EOSINOPHIL NFR BLD AUTO: 0.1 % — SIGNIFICANT CHANGE UP (ref 0–6)
FLUAV AG NPH QL: SIGNIFICANT CHANGE UP
FLUBV AG NPH QL: SIGNIFICANT CHANGE UP
GAS PNL BLDV: 131 MMOL/L — LOW (ref 136–145)
GLUCOSE BLDV-MCNC: 98 MG/DL — SIGNIFICANT CHANGE UP (ref 70–99)
GLUCOSE SERPL-MCNC: 102 MG/DL — HIGH (ref 70–99)
GLUCOSE UR QL: NEGATIVE MG/DL — SIGNIFICANT CHANGE UP
HCO3 BLDV-SCNC: 25 MMOL/L — SIGNIFICANT CHANGE UP (ref 22–29)
HCT VFR BLD CALC: 36.5 % — SIGNIFICANT CHANGE UP (ref 34.5–45)
HCT VFR BLDA CALC: 38 % — SIGNIFICANT CHANGE UP (ref 34.5–46.5)
HGB BLD CALC-MCNC: 12.6 G/DL — SIGNIFICANT CHANGE UP (ref 11.7–16.1)
HGB BLD-MCNC: 12 G/DL — SIGNIFICANT CHANGE UP (ref 11.5–15.5)
IANC: 14.3 K/UL — HIGH (ref 1.8–7.4)
IMM GRANULOCYTES NFR BLD AUTO: 1.3 % — HIGH (ref 0–0.9)
INR BLD: 1.28 RATIO — HIGH (ref 0.85–1.18)
KETONES UR-MCNC: NEGATIVE MG/DL — SIGNIFICANT CHANGE UP
LACTATE BLDV-MCNC: 1.1 MMOL/L — SIGNIFICANT CHANGE UP (ref 0.5–2)
LEUKOCYTE ESTERASE UR-ACNC: NEGATIVE — SIGNIFICANT CHANGE UP
LYMPHOCYTES # BLD AUTO: 1.86 K/UL — SIGNIFICANT CHANGE UP (ref 1–3.3)
LYMPHOCYTES # BLD AUTO: 10.6 % — LOW (ref 13–44)
MCHC RBC-ENTMCNC: 27.8 PG — SIGNIFICANT CHANGE UP (ref 27–34)
MCHC RBC-ENTMCNC: 32.9 GM/DL — SIGNIFICANT CHANGE UP (ref 32–36)
MCV RBC AUTO: 84.7 FL — SIGNIFICANT CHANGE UP (ref 80–100)
MONOCYTES # BLD AUTO: 1.07 K/UL — HIGH (ref 0–0.9)
MONOCYTES NFR BLD AUTO: 6.1 % — SIGNIFICANT CHANGE UP (ref 2–14)
NEUTROPHILS # BLD AUTO: 14.3 K/UL — HIGH (ref 1.8–7.4)
NEUTROPHILS NFR BLD AUTO: 81.6 % — HIGH (ref 43–77)
NITRITE UR-MCNC: NEGATIVE — SIGNIFICANT CHANGE UP
NRBC # BLD: 0 /100 WBCS — SIGNIFICANT CHANGE UP (ref 0–0)
NRBC # FLD: 0 K/UL — SIGNIFICANT CHANGE UP (ref 0–0)
PCO2 BLDV: 43 MMHG — SIGNIFICANT CHANGE UP (ref 39–52)
PH BLDV: 7.38 — SIGNIFICANT CHANGE UP (ref 7.32–7.43)
PH UR: 6.5 — SIGNIFICANT CHANGE UP (ref 5–8)
PLATELET # BLD AUTO: 416 K/UL — HIGH (ref 150–400)
PO2 BLDV: 51 MMHG — HIGH (ref 25–45)
POTASSIUM BLDV-SCNC: 4.9 MMOL/L — SIGNIFICANT CHANGE UP (ref 3.5–5.1)
POTASSIUM SERPL-MCNC: 3.8 MMOL/L — SIGNIFICANT CHANGE UP (ref 3.5–5.3)
POTASSIUM SERPL-SCNC: 3.8 MMOL/L — SIGNIFICANT CHANGE UP (ref 3.5–5.3)
PROT SERPL-MCNC: 7.7 G/DL — SIGNIFICANT CHANGE UP (ref 6–8.3)
PROT UR-MCNC: NEGATIVE MG/DL — SIGNIFICANT CHANGE UP
PROTHROM AB SERPL-ACNC: 14.4 SEC — HIGH (ref 9.5–13)
RBC # BLD: 4.31 M/UL — SIGNIFICANT CHANGE UP (ref 3.8–5.2)
RBC # FLD: 13.4 % — SIGNIFICANT CHANGE UP (ref 10.3–14.5)
RBC CASTS # UR COMP ASSIST: 1 /HPF — SIGNIFICANT CHANGE UP (ref 0–4)
REVIEW: SIGNIFICANT CHANGE UP
RSV RNA NPH QL NAA+NON-PROBE: SIGNIFICANT CHANGE UP
SAO2 % BLDV: 81.5 % — SIGNIFICANT CHANGE UP (ref 67–88)
SARS-COV-2 RNA SPEC QL NAA+PROBE: SIGNIFICANT CHANGE UP
SODIUM SERPL-SCNC: 136 MMOL/L — SIGNIFICANT CHANGE UP (ref 135–145)
SP GR SPEC: 1.01 — SIGNIFICANT CHANGE UP (ref 1–1.03)
SQUAMOUS # UR AUTO: 2 /HPF — SIGNIFICANT CHANGE UP (ref 0–5)
TROPONIN T, HIGH SENSITIVITY RESULT: <6 NG/L — SIGNIFICANT CHANGE UP
UROBILINOGEN FLD QL: 0.2 MG/DL — SIGNIFICANT CHANGE UP (ref 0.2–1)
WBC # BLD: 17.54 K/UL — HIGH (ref 3.8–10.5)
WBC # FLD AUTO: 17.54 K/UL — HIGH (ref 3.8–10.5)
WBC UR QL: 0 /HPF — SIGNIFICANT CHANGE UP (ref 0–5)

## 2024-05-30 PROCEDURE — 71275 CT ANGIOGRAPHY CHEST: CPT | Mod: 26,MC

## 2024-05-30 PROCEDURE — 99285 EMERGENCY DEPT VISIT HI MDM: CPT

## 2024-05-30 PROCEDURE — 71045 X-RAY EXAM CHEST 1 VIEW: CPT | Mod: 26

## 2024-05-30 PROCEDURE — 74177 CT ABD & PELVIS W/CONTRAST: CPT | Mod: 26,MC

## 2024-05-30 RX ORDER — SODIUM CHLORIDE 9 MG/ML
2200 INJECTION, SOLUTION INTRAVENOUS ONCE
Refills: 0 | Status: COMPLETED | OUTPATIENT
Start: 2024-05-30 | End: 2024-05-30

## 2024-05-30 RX ORDER — IBUPROFEN 200 MG
600 TABLET ORAL EVERY 6 HOURS
Refills: 0 | Status: DISCONTINUED | OUTPATIENT
Start: 2024-05-30 | End: 2024-06-11

## 2024-05-30 RX ORDER — PIPERACILLIN AND TAZOBACTAM 4; .5 G/20ML; G/20ML
3.38 INJECTION, POWDER, LYOPHILIZED, FOR SOLUTION INTRAVENOUS ONCE
Refills: 0 | Status: COMPLETED | OUTPATIENT
Start: 2024-05-30 | End: 2024-05-30

## 2024-05-30 RX ORDER — SIMETHICONE 80 MG/1
80 TABLET, CHEWABLE ORAL EVERY 6 HOURS
Refills: 0 | Status: DISCONTINUED | OUTPATIENT
Start: 2024-05-30 | End: 2024-06-11

## 2024-05-30 RX ORDER — ACETAMINOPHEN 500 MG
975 TABLET ORAL EVERY 6 HOURS
Refills: 0 | Status: DISCONTINUED | OUTPATIENT
Start: 2024-05-30 | End: 2024-06-02

## 2024-05-30 RX ORDER — APIXABAN 2.5 MG/1
2.5 TABLET, FILM COATED ORAL EVERY 12 HOURS
Refills: 0 | Status: DISCONTINUED | OUTPATIENT
Start: 2024-05-30 | End: 2024-06-11

## 2024-05-30 RX ADMIN — PIPERACILLIN AND TAZOBACTAM 200 GRAM(S): 4; .5 INJECTION, POWDER, LYOPHILIZED, FOR SOLUTION INTRAVENOUS at 17:27

## 2024-05-30 RX ADMIN — SODIUM CHLORIDE 2200 MILLILITER(S): 9 INJECTION, SOLUTION INTRAVENOUS at 18:00

## 2024-05-30 NOTE — ED ADULT NURSE NOTE - CAS EDN DISCHARGE INTERVENTIONS
PAST SURGICAL HISTORY:  No significant past surgical history      IV intact/Admission wristband placed

## 2024-05-30 NOTE — CONSULT NOTE ADULT - NSCONSULTADDITIONALINFOA_GEN_ALL_CORE
Gyn Onc Fellow Addendum:  Agree with above. 30 yo POD#9 s/p ex-lap, TREVON, BSO, pelvic and para-aortic LND, omentectomy, appendectomy for ovarian carcinoma presenting with fever at home.   VS, Labs reviewed  Hemodynamically stable. Afebrile on presentation.   Leukocytosis noted   UA not concerning for UTI   Nonspecific cough / respiratory symptoms overall unchanged from preop. CXR neg. F/u RVP.   F/u CT-A, CT A/P   Bcx, UCx collected   S/p Zosyn x1 in ED     Yesenia Holm MD

## 2024-05-30 NOTE — H&P ADULT - NSHPPHYSICALEXAM_GEN_ALL_CORE
Abdomen soft, non-tender, no guarding. Vital Signs Last 24 Hrs  T(C): 37.6 (30 May 2024 21:27), Max: 37.6 (30 May 2024 21:27)  T(F): 99.6 (30 May 2024 21:27), Max: 99.6 (30 May 2024 21:27)  HR: 86 (30 May 2024 21:27) (84 - 104)  BP: 119/74 (30 May 2024 21:27) (109/86 - 119/74)  BP(mean): --  RR: 20 (30 May 2024 21:27) (15 - 20)  SpO2: 99% (30 May 2024 21:27) (97% - 99%)    Parameters below as of 30 May 2024 21:27  Patient On (Oxygen Delivery Method): room air        PHYSICAL EXAM: Chaperoned w/ RN at bedside.   Gen: NAD, alert and oriented x 3  Cardiovascular: regular   Respiratory: breathing comfortably on RA  Abd: soft, non tender, non-distended  Pelvic: closed/long, no CMT, Uterus: normal size, non tender  Adnexa: non tender, no palpable masses  Extremities: NTBL  Skin: warm and well perfused

## 2024-05-30 NOTE — ED ADULT NURSE REASSESSMENT NOTE - NS ED NURSE REASSESS COMMENT FT1
Patient is AOx4 and in no signs of acute distress. Family at bedside. VS noted. Respirations even and unlabored, chest rise symmetrical b/l. Denies pain, SOB, or dizziness. Patient states "I am just tired". Comfort measures maintained. Bed in lowest position. Safety Maintained.

## 2024-05-30 NOTE — H&P ADULT - HISTORY OF PRESENT ILLNESS
30 yo POD 9 Ex-Lap, TREVON, BSO, Pelvic and para-aortic LND, Omentectomy, Appendectomy, Frozen = Carcinoma. Patient was discharged on POD 4 with improved pain control and afebrile. At home she was in her usual state of health until yesterday. She spiked a low grade temp at 4p yesterday at 4p of 100.7. She took some Tylenol and fever resolved. She then had a 101.5 at 9m on 5/29. She called the answering service which advised her to come to ED. She did not come overnight because she felt weak and tired. She took Tylenol and Motrin overnight. Today around 6a she had another low grade temp to 100.4 and at 145p today had a temperature of 101.2.   She endorses URI symptoms, including cough, and fevers before the surgery, prompting evaluation by PCP, who prescribed her amoxicillin. She reports finishing the course of abx before the surgery. Denies any sick contacts and is not having any notable GI/ symptoms. States loose stools but no diarrhea and denies any abnormal vaginal bleeding or discharge. Currently denies productive cough. Denies runny nose.       GYN Onc Hx  -Presented to the ER at [facility] on 5/6 with 3 days of RLQ pain, nausea, and decreased appetite. Workup included CT A/P which revealed abnormally enlarged heterogeneous solid and cystic  appearance of the left ovary/fallopian tube measuring 13.6 x 8.1 x 11.4cm highly suspicious for carcinoma.  -CA-125 is elevated at 810, ,  33, CEA 0.8  -5/2024 CT A/P - bilateral ovarian/adnexal solid and cystic masses, small volume carcinomatosis, trace ascites, trace pleural effusions, indeterminant hepatic lesions,  -5/21:  Ex-Lap, TREVON, BSO, Pelvic and para-aortic LND, Omentectomy, Appendectomy, Frozen = Carcinoma.       OBHx: Primigravid    PAST MEDICAL HISTORY:  H/O eczema , H/O pelvic mass , H/O upper respiratory infection H/O keloid of skin     Allergies:   	azithromycin: Drug, Hives  	Kiwi: Food, Hives (Mild)    Home Medications:   * Patient Currently Takes Medications as of 25-May-2024 17:35 documented in Structured Notes  · 	benzonatate 200 mg oral capsule: 1 cap(s) orally every 8 hours as needed for  cough  · 	oxyCODONE 5 mg oral tablet: 1 tab(s) orally every 6 hours as needed for Severe Pain (7 - 10) MDD: 4 tabs per day  · 	ibuprofen 600 mg oral tablet: 1 tab(s) orally every 6 hours  · 	acetaminophen 325 mg oral tablet: 3 tab(s) orally every 6 hours  · 	apixaban 2.5 mg oral tablet: 1 tab(s) orally 2 times a day  · 	benzonatate 200 mg oral capsule: 1 cap(s) orally 3 times a day

## 2024-05-30 NOTE — ED PROVIDER NOTE - ATTENDING CONTRIBUTION TO CARE
Attending Statement: I have personally seen and examined this patient. I have fully participated in the care of this patient. I have reviewed all pertinent clinical information, including history physical exam, plan and the Resident's note and agree except as noted  29yoF POD # 9 sp exploratory laparotomy, total abdominal hysterectomy, bilateral salpingoophorectomy, pelvic and paraortic lymph node dissection, omentectomy, and appendectomy. Frozen section of left ovarian cyst showing carcinoma Patient presents from home chief complaints of fever yesterday.  Tmax of 101 with myalgias.  States she has a  nonproductive cough.  Occasionally feels slightly short of breath.  But no orthopnea.  No chest pain. States she has been using incentive spirometry. Has postop abdominal pain.  But no associated vomiting, diarrhea.  No dysuria.  Has not started chemo or radiation yet.    VSS Blood pressure 109/86 heart rate 104 rest 18 satting 99% on room air patient sitting up in bed ANO x 3.  Not icteric.  Not jaundiced.  Has little to answer questions in full clear sentences not oxygen does not require oxygen at this time.  Has coarse breath sounds with decreased air entry at the bases right greater than left mild crackles.  Abdomen is soft nondistended has a vertical midline incision with Steri-Strips mild bruising but no overlying redness no fluctuance no dehiscence and no bleeding.  Mild tenderness to palpation.  Pelvic deferred at this time.  No CVA tenderness.  No leg swelling or calf tenderness bilaterally.  Plan n.p.o., EKG, sepsis labs, CT chest rule out PE/pneumonia/atelectasis proceed CT abdomen pelvis, IV fluids, antibiotics, patient offered and declined pain meds at this time.  Patient was evaluated by GYN oncology prior to my evaluation and agrees with plan.  Will follow patient

## 2024-05-30 NOTE — CONSULT NOTE ADULT - ASSESSMENT
28 yo POD 9 Ex-Lap, TREVON, BSO, Pelvic and para-aortic LND, Omentectomy, Appendectomy, Frozen = Carcinoma. Patient was discharged on POD 4 with improved pain control and afebrile. At home she was in her usual state of health until yesterday. Presents to the ED today in the setting of fevers of unknown origin. Patient recently post-op with URI symptoms. Unclear whether fevers are from respiratory infection vs SSI. Plan for further evaluation in the ER, will follow up pertinent labs and imaging.   -f/u CBC  -f/u UA/Ucx (5/30)  -Bcx(5/30)  -RVP(5/30)  -f/u CXR (5/30)  -f/u CTA-Chest and CT A/P(5/30)  -Patient to be re-evaluated by night resident after labs and imaging results, please page if patient clinical status changes.    Raquel PGY2  dw Dr. Holm

## 2024-05-30 NOTE — ED PROVIDER NOTE - PHYSICAL EXAMINATION
Megan Farrell DO (PGY1)   Physical Exam:    Gen: NAD, AOx3, non-toxic appearing, able to ambulate without assistance  Lung: course breath sounds at R lung base   CV: RRR, no murmurs, rubs or gallops  Abd: abdominal incision approx 15 cm with no erythema, drainage or ttp

## 2024-05-30 NOTE — ED PROVIDER NOTE - PROGRESS NOTE DETAILS
Labs and imaging unremarkable.  Discussed findings with gynecology team.  Gynecology will admit to their service under Dr. Bahena.  Patient understands and is aware.  Farrell PGY1

## 2024-05-30 NOTE — H&P ADULT - ASSESSMENT
28 yo POD 9 Ex-Lap, TREVON, BSO, Pelvic and para-aortic LND, Omentectomy, Appendectomy, Frozen = Carcinoma. Patient was discharged on POD 4 with improved pain control and afebrile. At home she was in her usual state of health until yesterday. Presents to the ED today in the setting of fevers of unknown origin. Patient recently post-op with URI symptoms. Unclear whether fevers are from respiratory infection vs SSI. CTA neg for PE. CT Abd/Pelvis reveals post-surgical changes with possible seroma in R adnexa. UA neg and RVP neg thus far.       Neuro: PO acetaminophen and ibuprofen PRN  CV: Hemodynamically stable   Pulm: O2 sat WNL on RA. Continue encouraging IS use  - throat lozenges for cough support  GI: Tolerating regular diet  : Voiding spontaneously  Heme: apixaban 2.5mg BID; SCDs while in bed  ID: appreciate ID recommendations  - Cont Zosyn (5/30 -)  - f/u Procalcitonin, full viral panel, acute hepatitis panel, HIV, and MRSA swab  - F/u BCx and UCx (5/30)  - continue to monitor fever curve  FEN: SLIV, Replete electrolytes PRN   Dispo: continue inpatient management        D/w Dr. Elder, GynOnc Fellow  Jonas Reyez, PGY2

## 2024-05-30 NOTE — H&P ADULT - NSHPLABSRESULTS_GEN_ALL_CORE
LABS:             12.0   17.54 )-----------( 416      ( 30 May 2024 17:46 )             36.5     05-30    136  |  99  |  9   ----------------------------<  102<H>  3.8   |  22  |  0.77    Ca    9.5      30 May 2024 17:46    TPro  7.7  /  Alb  4.2  /  TBili  0.6  /  DBili  x   /  AST  181<H>  /  ALT  163<H>  /  AlkPhos  173<H>  05-30    PT/INR - ( 30 May 2024 17:46 )   PT: 14.4 sec;   INR: 1.28 ratio    PTT - ( 30 May 2024 17:46 )  PTT:36.6 sec    Urinalysis Basic - ( 30 May 2024 17:46 )  Color: x / Appearance: x / SG: x / pH: x  Gluc: 102 mg/dL / Ketone: x  / Bili: x / Urobili: x   Blood: x / Protein: x / Nitrite: x   Leuk Esterase: x / RBC: x / WBC x   Sq Epi: x / Non Sq Epi: x / Bacteria: x    RADIOLOGY & ADDITIONAL STUDIES:  < from: CT Abdomen and Pelvis w/ IV Cont (05.30.24 @ 19:24) >    FINDINGS:  CHEST:  LUNGS AND LARGE AIRWAYS: Patent central airways. Redemonstrated multiple   bilateral lower lobe sub-4 mm nodules, without significant interval   change compared with the immediate prior CT chest 5/14/2024.  PLEURA: No pleural effusion.  VESSELS: No pulmonary embolism. Normal caliber thoracic aorta and main   pulmonary artery.  HEART: Heart size is normal. No pericardial effusion.  MEDIASTINUM AND SANDHYA: No lymphadenopathy.  CHEST WALL AND LOWER NECK: Within normal limits.    ABDOMEN AND PELVIS:  LIVER: Ill-defined right hepatic lobe lesions measuring up to 1.2 cm,   incompletely characterized and stable compared with CT 5/6/2024.  BILE DUCTS: Normal caliber.  GALLBLADDER: Within normal limits.  SPLEEN: Within normal limits.  PANCREAS: Within normal limits.  ADRENALS: Within normal limits.  KIDNEYS/URETERS: Symmetric contrast uptake bilaterally. No hydronephrosis.    BLADDER: Within normal limits.  REPRODUCTIVE ORGANS: Status post hysterectomy and resection of bilateral   adnexal masses.    BOWEL: No bowel obstruction. Appendectomy.  PERITONEUM: Small ascites. No collection. Focus of free air in the left   hemipelvis.  VESSELS: Hypoattenuating structure measuring 3.1 x 2.4 cm in the region   of the ligated right ovarian vein, possibly seroma or lymphangioma.   (303-62).  RETROPERITONEUM/LYMPH NODES: No lymphadenopathy.  ABDOMINAL WALL: Midline postsurgical changes.  BONES: Within normal limits.    IMPRESSION:    No pulmonary embolism.    Status post hysterectomy and bilateral salpingo-oophorectomy. Small free   fluid likely postsurgical without discrete rim-enhancing collection.    Hypoattenuating structure surrounding surgical clips along the course of   the ligated right ovarian vein, differential for which includes seroma or   lymphangioma versus thrombosis, which is considered less likely.    --- End of Report ---    < end of copied text >  < from: Xray Chest 1 View AP/PA (05.30.24 @ 18:17) >    FINDINGS:    Clear lungs.  No pneumothorax or large pleural effusion.  Heart size within normal limits.    IMPRESSION:    Clear lungs.    --- End of Report ---    < end of copied text >

## 2024-05-30 NOTE — ED ADULT NURSE NOTE - OBJECTIVE STATEMENT
Pt received to intake room 4. Pt is A&Ox4, ambulatory without assistance, s/p hysterectomy 5/21. Pt presents to ED c/o fevers x 2 days. reports Tmax of 101.5F. also endorses nonproductive cough. denies chest pain, SOB, headache, dizziness, abdominal pain, n/v/d, urinary symptoms, numbness/tingling. neuro/sensory intact. breathing is even and unlabored. abdomen is soft, nondistended. abdominal surgical incision WNL. VS as noted. EKG in chart. right AC 20G IV placed, +blood return, flushes without difficulty. labs collected and sent. awaiting imaging. stretcher set in lowest position, call bell within reach, safety maintained.

## 2024-05-30 NOTE — CONSULT NOTE ADULT - SUBJECTIVE AND OBJECTIVE BOX
ALEXEI GAMBINO  29y  Female [id number]    HPI:  28 yo POD 9 Ex-Lap, TREVON, BSO, Pelvic and para-aortic LND, Omentectomy, Appendectomy, Frozen = Carcinoma. Patient was discharged on POD 4 with improved pain control and afebrile. At home she was in her usual state of health until yesterday. She spiked a low grade temp at 4p yesterday at 4p of 100.7. She took some Tylenol and fever resolved. She then had a 101.5 at 9m on 5/29. She called the answering service which advised her to come to ED. She did not come overnight because she felt weak and tired. She took Tylenol and Motrin overnight. Today around 6a she had another low grade temp to 100.4 and at 145p today had a temperature of 101.2. She endorses URI symptoms including cough and states before surgery she was being tx with amoxicillin for URI. Denies any sick contacts and is not having any notable GI/ symptoms. States loose stools but no diarrhea and denies any abnormal vaginal bleeding or discharge.     GYN Onc Hx  -Presented to the ER at [facility] on 5/6 with 3 days of RLQ pain, nausea, and decreased appetite. Workup included CT A/P which revealed abnormally enlarged heterogeneous solid and cystic  appearance of the left ovary/fallopian tube measuring 13.6 x 8.1 x 11.4cm highly suspicious for carcinoma.  -CA-125 is elevated at 810, ,  33, CEA 0.8  -5/2024 CT A/P - bilateral ovarian/adnexal solid and cystic masses, small volume carcinomatosis, trace ascites, trace pleural effusions, indeterminant hepatic lesions,  -5/21:  Ex-Lap, TREVON, BSO, Pelvic and para-aortic LND, Omentectomy, Appendectomy, Frozen = Carcinoma.       OBHx: Primigravid    PAST MEDICAL HISTORY:  H/O eczema , H/O pelvic mass , H/O upper respiratory infection H/O keloid of skin     Allergies:        Allergies:  	azithromycin: Drug, Hives  	Kiwi: Food, Hives (Mild)    Home Medications:   * Patient Currently Takes Medications as of 25-May-2024 17:35 documented in Structured Notes  · 	benzonatate 200 mg oral capsule: 1 cap(s) orally every 8 hours as needed for  cough  · 	oxyCODONE 5 mg oral tablet: 1 tab(s) orally every 6 hours as needed for Severe Pain (7 - 10) MDD: 4 tabs per day  · 	ibuprofen 600 mg oral tablet: 1 tab(s) orally every 6 hours  · 	acetaminophen 325 mg oral tablet: 3 tab(s) orally every 6 hours  · 	apixaban 2.5 mg oral tablet: 1 tab(s) orally 2 times a day  · 	benzonatate 200 mg oral capsule: 1 cap(s) orally 3 times a day      Vital Signs Last 24 Hrs  T(C): 36.6 (30 May 2024 16:08), Max: 36.6 (30 May 2024 16:08)  T(F): 97.8 (30 May 2024 16:08), Max: 97.8 (30 May 2024 16:08)  HR: 104 (30 May 2024 16:08) (104 - 104)  BP: 109/86 (30 May 2024 16:08) (109/86 - 109/86)  BP(mean): --  RR: 18 (30 May 2024 16:08) (18 - 18)  SpO2: 99% (30 May 2024 16:08) (99% - 99%)    Parameters below as of 30 May 2024 16:08  Patient On (Oxygen Delivery Method): room air        Physical Exam:   General: sitting comfortably in bed, NAD   CV: RRR  Lungs: Faint crackles BL, good air flow b/l   Abdomen: soft, softly-distended, appropriately-tender. No guarding, no rebound, normal bowel sounds  Incision: Midline vertical incision - clean, dry, intact   Extremities: no lower extremity edema or calf tenderness bilaterally;

## 2024-05-30 NOTE — ED ADULT NURSE NOTE - NS ED NURSE LEVEL OF CONSCIOUSNESS ORIENTATION
Oriented - self; Oriented - place; Oriented - time
H/O colonoscopy    H/O vocal cord paralysis  filter injection 2018  History of lung biopsy  2017

## 2024-05-30 NOTE — ED PROVIDER NOTE - OBJECTIVE STATEMENT
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  Patient on Eliquis for DVT prophylaxis.  Denies nausea, vomiting, abdominal pain, LE edema or pain, discharge, erythema, or pain at site of incision, hematuria, dysuria, or any other symptoms at this time.  Jami PGY1

## 2024-05-30 NOTE — ED PROVIDER NOTE - CLINICAL SUMMARY MEDICAL DECISION MAKING FREE TEXT BOX
29-year-old female postop day 9 presenting for 2 days of fever associated with cough.  On exam patient has crackles in the right lung field with deep coarse breath sounds.  Surgical incision otherwise healing well with no signs of obvious infection at surgical site.  Will initiate a sepsis workup to assess for source of infection as well as CTA chest to assess for pneumonia and CT abdomen pelvis to assess for intra-abdominal infection after surgery.  Likely admit for IV antibiotics.  Jami PGY1

## 2024-05-30 NOTE — ED ADULT NURSE NOTE - NSFALLHARMRISKINTERV_ED_ALL_ED
Assistance OOB with selected safe patient handling equipment if applicable/Communicate risk of Fall with Harm to all staff, patient, and family/Provide visual cue: red socks, yellow wristband, yellow gown, etc/Reinforce activity limits and safety measures with patient and family/Bed in lowest position, wheels locked, appropriate side rails in place/Call bell, personal items and telephone in reach/Instruct patient to call for assistance before getting out of bed/chair/stretcher/Non-slip footwear applied when patient is off stretcher/[place] to call system/Physically safe environment - no spills, clutter or unnecessary equipment/Purposeful Proactive Rounding/Room/bathroom lighting operational, light cord in reach

## 2024-05-30 NOTE — ED ADULT TRIAGE NOTE - CHIEF COMPLAINT QUOTE
pt s/p hysterectomy 5/21, d/c from hospital 5 days ago. presents to ED for fever x 2 days. pt states pain is not any worse than before. pt c/o nonproductive cough. last took 600mg of advil at 1 pm. T-max 101.5.

## 2024-05-31 ENCOUNTER — TRANSCRIPTION ENCOUNTER (OUTPATIENT)
Age: 29
End: 2024-05-31

## 2024-05-31 PROBLEM — Z87.2 PERSONAL HISTORY OF DISEASES OF THE SKIN AND SUBCUTANEOUS TISSUE: Chronic | Status: ACTIVE | Noted: 2024-05-14

## 2024-05-31 PROBLEM — N83.209 UNSPECIFIED OVARIAN CYST, UNSPECIFIED SIDE: Chronic | Status: ACTIVE | Noted: 2024-05-14

## 2024-05-31 PROBLEM — Z87.898 PERSONAL HISTORY OF OTHER SPECIFIED CONDITIONS: Chronic | Status: ACTIVE | Noted: 2024-05-14

## 2024-05-31 LAB
ADD ON TEST-SPECIMEN IN LAB: SIGNIFICANT CHANGE UP
ALT FLD-CCNC: 132 U/L — HIGH (ref 4–33)
ANION GAP SERPL CALC-SCNC: 11 MMOL/L — SIGNIFICANT CHANGE UP (ref 7–14)
AST SERPL-CCNC: 124 U/L — HIGH (ref 4–32)
B PERT DNA SPEC QL NAA+PROBE: SIGNIFICANT CHANGE UP
B PERT+PARAPERT DNA PNL SPEC NAA+PROBE: SIGNIFICANT CHANGE UP
BASOPHILS # BLD AUTO: 0.08 K/UL — SIGNIFICANT CHANGE UP (ref 0–0.2)
BASOPHILS NFR BLD AUTO: 0.6 % — SIGNIFICANT CHANGE UP (ref 0–2)
BLD GP AB SCN SERPL QL: NEGATIVE — SIGNIFICANT CHANGE UP
BORDETELLA PARAPERTUSSIS (RAPRVP): SIGNIFICANT CHANGE UP
BUN SERPL-MCNC: 6 MG/DL — LOW (ref 7–23)
C PNEUM DNA SPEC QL NAA+PROBE: SIGNIFICANT CHANGE UP
CALCIUM SERPL-MCNC: 8.8 MG/DL — SIGNIFICANT CHANGE UP (ref 8.4–10.5)
CHLORIDE SERPL-SCNC: 103 MMOL/L — SIGNIFICANT CHANGE UP (ref 98–107)
CO2 SERPL-SCNC: 22 MMOL/L — SIGNIFICANT CHANGE UP (ref 22–31)
CREAT SERPL-MCNC: 0.7 MG/DL — SIGNIFICANT CHANGE UP (ref 0.5–1.3)
EGFR: 119 ML/MIN/1.73M2 — SIGNIFICANT CHANGE UP
EOSINOPHIL # BLD AUTO: 0.06 K/UL — SIGNIFICANT CHANGE UP (ref 0–0.5)
EOSINOPHIL NFR BLD AUTO: 0.5 % — SIGNIFICANT CHANGE UP (ref 0–6)
FLUAV SUBTYP SPEC NAA+PROBE: SIGNIFICANT CHANGE UP
FLUBV RNA SPEC QL NAA+PROBE: SIGNIFICANT CHANGE UP
GLUCOSE SERPL-MCNC: 106 MG/DL — HIGH (ref 70–99)
HADV DNA SPEC QL NAA+PROBE: SIGNIFICANT CHANGE UP
HAV IGM SER-ACNC: SIGNIFICANT CHANGE UP
HBV CORE IGM SER-ACNC: SIGNIFICANT CHANGE UP
HBV SURFACE AG SER-ACNC: SIGNIFICANT CHANGE UP
HCOV 229E RNA SPEC QL NAA+PROBE: SIGNIFICANT CHANGE UP
HCOV HKU1 RNA SPEC QL NAA+PROBE: SIGNIFICANT CHANGE UP
HCOV NL63 RNA SPEC QL NAA+PROBE: SIGNIFICANT CHANGE UP
HCOV OC43 RNA SPEC QL NAA+PROBE: SIGNIFICANT CHANGE UP
HCT VFR BLD CALC: 31.5 % — LOW (ref 34.5–45)
HCV AB S/CO SERPL IA: 0.08 S/CO — SIGNIFICANT CHANGE UP (ref 0–0.99)
HCV AB SERPL-IMP: SIGNIFICANT CHANGE UP
HGB BLD-MCNC: 10.4 G/DL — LOW (ref 11.5–15.5)
HMPV RNA SPEC QL NAA+PROBE: SIGNIFICANT CHANGE UP
HPIV1 RNA SPEC QL NAA+PROBE: SIGNIFICANT CHANGE UP
HPIV2 RNA SPEC QL NAA+PROBE: SIGNIFICANT CHANGE UP
HPIV3 RNA SPEC QL NAA+PROBE: SIGNIFICANT CHANGE UP
HPIV4 RNA SPEC QL NAA+PROBE: SIGNIFICANT CHANGE UP
IANC: 9.62 K/UL — HIGH (ref 1.8–7.4)
IMM GRANULOCYTES NFR BLD AUTO: 0.7 % — SIGNIFICANT CHANGE UP (ref 0–0.9)
LYMPHOCYTES # BLD AUTO: 1.81 K/UL — SIGNIFICANT CHANGE UP (ref 1–3.3)
LYMPHOCYTES # BLD AUTO: 14.5 % — SIGNIFICANT CHANGE UP (ref 13–44)
M PNEUMO DNA SPEC QL NAA+PROBE: SIGNIFICANT CHANGE UP
MAGNESIUM SERPL-MCNC: 2.1 MG/DL — SIGNIFICANT CHANGE UP (ref 1.6–2.6)
MCHC RBC-ENTMCNC: 28.1 PG — SIGNIFICANT CHANGE UP (ref 27–34)
MCHC RBC-ENTMCNC: 33 GM/DL — SIGNIFICANT CHANGE UP (ref 32–36)
MCV RBC AUTO: 85.1 FL — SIGNIFICANT CHANGE UP (ref 80–100)
MONOCYTES # BLD AUTO: 0.85 K/UL — SIGNIFICANT CHANGE UP (ref 0–0.9)
MONOCYTES NFR BLD AUTO: 6.8 % — SIGNIFICANT CHANGE UP (ref 2–14)
MRSA PCR RESULT.: SIGNIFICANT CHANGE UP
NEUTROPHILS # BLD AUTO: 9.62 K/UL — HIGH (ref 1.8–7.4)
NEUTROPHILS NFR BLD AUTO: 76.9 % — SIGNIFICANT CHANGE UP (ref 43–77)
NRBC # BLD: 0 /100 WBCS — SIGNIFICANT CHANGE UP (ref 0–0)
NRBC # FLD: 0 K/UL — SIGNIFICANT CHANGE UP (ref 0–0)
PHOSPHATE SERPL-MCNC: 2.6 MG/DL — SIGNIFICANT CHANGE UP (ref 2.5–4.5)
PLATELET # BLD AUTO: 337 K/UL — SIGNIFICANT CHANGE UP (ref 150–400)
POTASSIUM SERPL-MCNC: 3.8 MMOL/L — SIGNIFICANT CHANGE UP (ref 3.5–5.3)
POTASSIUM SERPL-SCNC: 3.8 MMOL/L — SIGNIFICANT CHANGE UP (ref 3.5–5.3)
PROCALCITONIN SERPL-MCNC: 0.24 NG/ML — HIGH (ref 0.02–0.1)
RAPID RVP RESULT: SIGNIFICANT CHANGE UP
RBC # BLD: 3.7 M/UL — LOW (ref 3.8–5.2)
RBC # FLD: 13.6 % — SIGNIFICANT CHANGE UP (ref 10.3–14.5)
RH IG SCN BLD-IMP: POSITIVE — SIGNIFICANT CHANGE UP
RSV RNA SPEC QL NAA+PROBE: SIGNIFICANT CHANGE UP
RV+EV RNA SPEC QL NAA+PROBE: SIGNIFICANT CHANGE UP
S AUREUS DNA NOSE QL NAA+PROBE: SIGNIFICANT CHANGE UP
SARS-COV-2 RNA SPEC QL NAA+PROBE: SIGNIFICANT CHANGE UP
SODIUM SERPL-SCNC: 136 MMOL/L — SIGNIFICANT CHANGE UP (ref 135–145)
WBC # BLD: 12.51 K/UL — HIGH (ref 3.8–10.5)
WBC # FLD AUTO: 12.51 K/UL — HIGH (ref 3.8–10.5)

## 2024-05-31 PROCEDURE — 99222 1ST HOSP IP/OBS MODERATE 55: CPT

## 2024-05-31 RX ORDER — FAMOTIDINE 10 MG/ML
20 INJECTION INTRAVENOUS DAILY
Refills: 0 | Status: DISCONTINUED | OUTPATIENT
Start: 2024-05-31 | End: 2024-06-11

## 2024-05-31 RX ORDER — BENZOCAINE AND MENTHOL 5; 1 G/100ML; G/100ML
1 LIQUID ORAL EVERY 4 HOURS
Refills: 0 | Status: DISCONTINUED | OUTPATIENT
Start: 2024-05-31 | End: 2024-06-11

## 2024-05-31 RX ORDER — PIPERACILLIN AND TAZOBACTAM 4; .5 G/20ML; G/20ML
3.38 INJECTION, POWDER, LYOPHILIZED, FOR SOLUTION INTRAVENOUS EVERY 8 HOURS
Refills: 0 | Status: DISCONTINUED | OUTPATIENT
Start: 2024-05-31 | End: 2024-06-01

## 2024-05-31 RX ADMIN — BENZOCAINE AND MENTHOL 1 LOZENGE: 5; 1 LIQUID ORAL at 13:44

## 2024-05-31 RX ADMIN — PIPERACILLIN AND TAZOBACTAM 25 GRAM(S): 4; .5 INJECTION, POWDER, LYOPHILIZED, FOR SOLUTION INTRAVENOUS at 17:27

## 2024-05-31 RX ADMIN — BENZOCAINE AND MENTHOL 1 LOZENGE: 5; 1 LIQUID ORAL at 06:48

## 2024-05-31 RX ADMIN — BENZOCAINE AND MENTHOL 1 LOZENGE: 5; 1 LIQUID ORAL at 21:44

## 2024-05-31 RX ADMIN — PIPERACILLIN AND TAZOBACTAM 25 GRAM(S): 4; .5 INJECTION, POWDER, LYOPHILIZED, FOR SOLUTION INTRAVENOUS at 02:36

## 2024-05-31 RX ADMIN — BENZOCAINE AND MENTHOL 1 LOZENGE: 5; 1 LIQUID ORAL at 10:02

## 2024-05-31 RX ADMIN — Medication 600 MILLIGRAM(S): at 17:27

## 2024-05-31 RX ADMIN — APIXABAN 2.5 MILLIGRAM(S): 2.5 TABLET, FILM COATED ORAL at 17:27

## 2024-05-31 RX ADMIN — PIPERACILLIN AND TAZOBACTAM 25 GRAM(S): 4; .5 INJECTION, POWDER, LYOPHILIZED, FOR SOLUTION INTRAVENOUS at 09:55

## 2024-05-31 RX ADMIN — BENZOCAINE AND MENTHOL 1 LOZENGE: 5; 1 LIQUID ORAL at 17:26

## 2024-05-31 RX ADMIN — APIXABAN 2.5 MILLIGRAM(S): 2.5 TABLET, FILM COATED ORAL at 06:47

## 2024-05-31 NOTE — CHART NOTE - NSCHARTNOTEFT_GEN_A_CORE
Patient seen and examined at bedside, still complains of persistent cough. Having increased serous vaginal discharge.     T(C): 37.3 (05-31-24 @ 14:27), Max: 37.6 (05-30-24 @ 21:27)  HR: 90 (05-31-24 @ 14:27) (84 - 106)  BP: 99/65 (05-31-24 @ 14:27) (99/65 - 119/74)  RR: 18 (05-31-24 @ 14:27) (15 - 20)  SpO2: 100% (05-31-24 @ 14:27) (96% - 100%)    I&O's Summary    30 May 2024 07:01  -  31 May 2024 07:00  --------------------------------------------------------  IN: 340 mL / OUT: 600 mL / NET: -260 mL    31 May 2024 07:01  -  31 May 2024 15:48  --------------------------------------------------------  IN: 480 mL / OUT: 450 mL / NET: 30 mL        Physical Exam:  General: NAD  Lungs: breathing comfortably on RA  : Notable amount of serous discharge on pad      Labs:             10.4<L>  12.51<H> )-----------( 337      ( 05-31 @ 05:31 )             31.5<L>               12.0   17.54<H> )-----------( 416<H>    ( 05-30 @ 17:46 )             36.5         MEDICATIONS  (STANDING):  acetaminophen     Tablet .. 975 milliGRAM(s) Oral every 6 hours  apixaban 2.5 milliGRAM(s) Oral every 12 hours  benzocaine/menthol Lozenge 1 Lozenge Oral every 4 hours  famotidine    Tablet 20 milliGRAM(s) Oral daily  guaiFENesin  milliGRAM(s) Oral every 12 hours  ibuprofen  Tablet. 600 milliGRAM(s) Oral every 6 hours  piperacillin/tazobactam IVPB.. 3.375 Gram(s) IV Intermittent every 8 hours    MEDICATIONS  (PRN):  simethicone 80 milliGRAM(s) Chew every 6 hours PRN Gas      28 yo POD 10 s/p  Ex-Lap, TREVON, BSO, Pelvic and para-aortic LND, Omentectomy, Appendectomy, Frozen = Carcinoma. Patient was discharged on POD 4 with improved pain control and afebrile. At home she was in her usual state of health until 5/29 and presented to the ED yesterday in the setting of fevers of unknown origin. Patient recently post-op with URI symptoms. Unclear whether fevers are from respiratory infection vs SSI. CTA neg for PE. CT Abd/Pelvis reveals post-surgical changes with possible seroma in R adnexa. UA neg and RVP neg thus far. Patient admitted for further evaluation and treatment with IV abx.  -Patient wit no acute complaints.  -ID: cw Zosyn, RUQ sono, GGT, Procalcitonin, HIV, acute hep panel, full viral panel, MRSA, cont Zosyn.  -f/u RUQ sono, GGT, HIV, MRSA, hepatic viral panel.   -Plan to monitor serous vaginal discharge.    Raquel PGY2

## 2024-05-31 NOTE — PATIENT PROFILE ADULT - FUNCTIONAL ASSESSMENT - DAILY ACTIVITY SECTION LABEL
. Mapping and [facility] 3/16/17.      CBC CMP PT/PTT direct and indirect bilirubin, HgbA1C EKG    Pt has appt to see PMD Mapping and [facility] 3/16/17.      CBC CMP PT/PTT direct and indirect bilirubin, HgbA1C EKG    Pt has appt to see PMD    FEI precautions, OR booking informed

## 2024-05-31 NOTE — DISCHARGE NOTE PROVIDER - DISCHARGE SERVICE FOR PATIENT
negative on the discharge service for the patient. I have reviewed and made amendments to the documentation where necessary.

## 2024-05-31 NOTE — DISCHARGE NOTE PROVIDER - NSDCCPCAREPLAN_GEN_ALL_CORE_FT
PRINCIPAL DISCHARGE DIAGNOSIS  Diagnosis: Postoperative fever  Assessment and Plan of Treatment:

## 2024-05-31 NOTE — DISCHARGE NOTE PROVIDER - DETAILS OF MALNUTRITION DIAGNOSIS/DIAGNOSES
This patient has been assessed with a concern for Malnutrition and was treated during this hospitalization for the following Nutrition diagnosis/diagnoses:     -  06/06/2024: Moderate protein-calorie malnutrition

## 2024-05-31 NOTE — DISCHARGE NOTE PROVIDER - HOSPITAL COURSE
30 yo s/p  Ex-Lap, TREVON, BSO, Pelvic and para-aortic LND, Omentectomy, Appendectomy, Frozen = Carcinoma. Patient was discharged on POD 4 with improved pain control and afebrile. At home she was in her usual state of health until 5/29 and presented to the ED yesterday in the setting of fevers of unknown origin associated with URI symptoms. Patient admitted for infectious workup and antibiotic management.     HD#1: Hemodynamically stable 28 yo s/p  Ex-Lap, TREVON, BSO, Pelvic and para-aortic LND, Omentectomy, Appendectomy, Frozen = Carcinoma. Patient was discharged on POD 4 with improved pain control and afebrile. At home she was in her usual state of health until 5/29 and presented to the ED yesterday in the setting of fevers of unknown origin associated with URI symptoms. Patient admitted for infectious workup and antibiotic management.     HD#1: Hemodynamically stable. Started on Zosyn (5/30-), BCx and UCx drawn. Pt continued on Apixaban for DVT prophylaxis. ID consulted, recommended Procalcitonin, HIV, acute hep panel, full viral panel, MRSA, cont Zosyn. Chest x-ray wnl, CTAP showed NO evidence of PE, postsurgical changes. Pain well controlled.   HD#2: Pt is afebrile, does report subjective fevers and non-productive cough. Pain well controlled.     Upon discharge on HD# , the patient is ambulating, voiding spontaneously, tolerating oral intake, pain was well controlled with oral medication, and vital signs were stable. Patient to have close follow up with Dr. Schultz in [facility]c office. 30 yo s/p  Ex-Lap, TREVON, BSO, Pelvic and para-aortic LND, Omentectomy, Appendectomy, Frozen = Carcinoma. Patient was discharged on POD 4 with improved pain control and afebrile. At home she was in her usual state of health until 5/29 and presented to the ED yesterday in the setting of fevers of unknown origin associated with URI symptoms. Patient admitted for infectious workup and antibiotic management.   HD#1: Hemodynamically stable. Started on Zosyn (5/30-), BCx and UCx drawn. Labs notable for transaminitis and leukocytosis. Pt continued on Apixaban for DVT prophylaxis. ID consulted, recommended Procalcitonin, HIV, acute hep panel, full viral panel, MRSA, cont Zosyn. Chest x-ray wnl, CTAP showed NO evidence of PE, postsurgical changes. Pain well controlled. HD#2: Pt is afebrile. Pain well controlled. UCx normal. AST/ALT downtrending, Leukocytosis resolving.      Upon discharge on HD# , the patient is ambulating, voiding spontaneously, tolerating oral intake, pain was well controlled with oral medication, and vital signs were stable. Patient to have close follow up with Dr. Schultz in [facility]yn [facility]c office. 28 yo s/p  Ex-Lap, TREVON, BSO, Pelvic and para-aortic LND, Omentectomy, Appendectomy, Frozen = Carcinoma. Patient was discharged on POD 4 with improved pain control and afebrile. At home she was in her usual state of health until 5/29 and presented to the ED yesterday in the setting of fevers of unknown origin associated with URI symptoms. Patient admitted for infectious workup and antibiotic management.   HD#1: Hemodynamically stable. Started on Zosyn (5/30-), BCx and UCx drawn. Labs notable for transaminitis and leukocytosis. Pt continued on Apixaban for DVT prophylaxis. ID consulted, recommended Procalcitonin, HIV, acute hep panel, full viral panel, MRSA, cont Zosyn. Chest x-ray wnl, CTAP showed NO evidence of PE,  7.0 x 2.5 x 2.4 cm abscess in the post hysterectomy surgical bed. Pain well controlled. HD#2: Pt is afebrile. Pain well controlled. UCx normal. AST/ALT downtrending, Leukocytosis resolving. On day 3, pt with another episode of fever and continues cough. Pt continued on zosyn. hepatic and respiratory viral panel wnl. Pulmonology consulted and recommended cough trial. Pt continues to spike fevers daily. Previous cultures with no growths. Repeat blood cultures drawn. IR consulted to evaluate for possible drainage of abscess, they recommended continues conservative management due to difficulty accessing the abscess. Pt switched over to Meropenem(6/3-) for broader antibiotic coverage. Pt with episode of truncal, puritic maculopapular rash concerning for possible reaction to antibiotics. Meropenem was discontinued and pt was switched back to Zosyn. Dermatology consulted and recommend starting on  Pt with another episode of fever     Upon discharge on HD# , the patient is ambulating, voiding spontaneously, tolerating oral intake, pain was well controlled with oral medication, and vital signs were stable. Patient to have close follow up with Dr. Schultz in [facility]yn [facility]c office. 28 yo s/p  Ex-Lap, TREVON, BSO, Pelvic and para-aortic LND, Omentectomy, Appendectomy, Frozen = Carcinoma. Patient was discharged on POD 4 with improved pain control and afebrile. At home she was in her usual state of health until 5/29 and presented to the ED yesterday in the setting of fevers of unknown origin associated with URI symptoms. Patient admitted for infectious workup and antibiotic management.   On 5/31, Hemodynamically stable. Started on Zosyn (5/30-), BCx and UCx drawn. Labs notable for transaminitis and leukocytosis. Pt continued on Apixaban for DVT prophylaxis. ID consulted, recommended Procalcitonin, HIV, acute hep panel, full viral panel, MRSA, cont Zosyn. Chest x-ray wnl, CTAP showed NO evidence of PE,  7.0 x 2.5 x 2.4 cm abscess in the post hysterectomy surgical bed. LE dopplers wnl. Pain well controlled. On 6/1, Pt was afebrile. Pain well controlled. UCx normal. AST/ALT downtrending, Leukocytosis resolving. On 6/2, pt with another episode of fever and continued cough. Pt continued on zosyn. Hepatic and respiratory viral panel wnl, MRSA negative. Pulmonology consulted and recommended cough trial with flonase, benzonate, Duoneb and budesonide. Pt continues to spike fevers daily. Previous cultures with no growths. Repeat blood cultures drawn. IR consulted to evaluate for possible drainage of abscess. Per IR, candidate for drainage 2/2 to poor window cw antibx. Pt switched over to Meropenem(6/3-) for broader antibiotic coverage. Subsequently, pt developed a  truncal, pruritic maculopapular rash and fever concerning for possible reaction to antibiotics. Meropenem was discontinued and pt was switched back to Zosyn. Dermatology consulted and recommend starting on  triamcinolone 0.1% ointment BID, less concerned for DIHS. ID recommending discontinuing zosyn and stating patient on cipro/flagyl (6/5-). Repeat CTAP (6/5) showed a decrease in size of surgical bed abscess (3.3 x 2.0 cm). IR reconsulted for possibility of draining in the setting of patient persistent fevers. Per IR, unable to access a window for drainage.     Upon discharge on HD# , the patient continued to remain afebrile, is ambulating, voiding spontaneously, tolerating oral intake, pain was well controlled with oral medication, and vital signs were stable. Patient to have close follow up with Dr. Schultz in [facility]yn [facility]c office. 28 yo s/p  Ex-Lap, TREVON, BSO, Pelvic and para-aortic LND, Omentectomy, Appendectomy, Frozen = Carcinoma. Patient was discharged on POD 4 with improved pain control and afebrile. At home she was in her usual state of health until 5/29 and presented to the ED 5/30 in the setting of fevers of unknown origin associated with URI symptoms. Patient admitted for infectious workup and antibiotic management.   On 5/31, Hemodynamically stable. Started on Zosyn (5/30-), BCx and UCx drawn. Labs notable for transaminitis and leukocytosis. Pt continued on Apixaban for DVT prophylaxis. ID consulted, recommended Procalcitonin, HIV, acute hep panel, full viral panel, MRSA, cont Zosyn. Chest x-ray wnl, CTAP showed NO evidence of PE,  7.0 x 2.5 x 2.4 cm abscess in the post hysterectomy surgical bed. LE dopplers wnl. Pain well controlled. On 6/1, Pt was afebrile. Pain well controlled. UCx normal. AST/ALT downtrending, Leukocytosis resolving. On 6/2, pt with another episode of fever and continued cough. Pt continued on zosyn. Hepatic and respiratory viral panel wnl, MRSA negative. Pulmonology consulted and recommended cough trial with flonase, benzonate, Duoneb and budesonide. Pt continues to spike fevers daily. Previous cultures with no growths. Repeat blood cultures drawn. IR consulted to evaluate for possible drainage of abscess. Per IR, Pt not a candidate for drainage 2/2 to poor window cw antibx. Pt switched over to Meropenem(6/3-) for broader antibiotic coverage. Subsequently, pt developed a  truncal, pruritic maculopapular rash and fever concerning for possible reaction to antibiotics. Meropenem was discontinued and pt was switched back to Zosyn. Dermatology consulted, concern for morbiliform drug related rash, recommend starting on triamcinolone 0.1% ointment BID , less concerned for DIHS. ID recommending discontinuing zosyn and stating patient on cipro/flagyl (6/5-). Repeat CTAP (6/5) showed a decrease in size of surgical bed abscess (3.3 x 2.0 cm). IR reconsulted for possibility of draining in the setting of patient persistent fevers. Per IR, unable to access a window for drainage. From 6/7-6/8, pt's rash continued to improve. She remained afebrile. On 6/9, Pt transitioned to oral flagyl and cipro.     Upon discharge on HD# , the patient continued to remain afebrile, is ambulating, voiding spontaneously, tolerating oral intake, pain was well controlled with oral medication, and vital signs were stable. Patient to have close follow up with Dr. Schultz in [facility]yn [facility]c office. 30 yo s/p  Ex-Lap, TREVON, BSO, Pelvic and para-aortic LND, Omentectomy, Appendectomy, Frozen = Carcinoma. Patient was discharged on POD 4 with improved pain control and afebrile. At home she was in her usual state of health until 5/29 and presented to the ED 5/30 in the setting of fevers of unknown origin associated with URI symptoms. Patient admitted for infectious workup and antibiotic management.     On 5/31, Hemodynamically stable. Started on Zosyn (5/30-), BCx and UCx drawn. Labs notable for transaminitis and leukocytosis. Pt continued on Apixaban for DVT prophylaxis. ID consulted, recommended Procalcitonin, HIV, acute hep panel, full viral panel, MRSA, cont Zosyn. Chest x-ray wnl, CTAP showed NO evidence of PE,  7.0 x 2.5 x 2.4 cm abscess in the post hysterectomy surgical bed. LE dopplers wnl. Pain well controlled.     On 6/1, Pt was afebrile. Pain well controlled. UCx normal. Leukocytosis resolving. On 6/2, pt with another episode of fever and continued cough. Pt continued on zosyn. Viral panel wnl, MRSA negative. Patient complained of serous discharge which was most likely in the setting of abdominal collection. Tampon test was preformed in the setting of c.f. vesico-vaginal fistula. Test was negative.       6/1-6/3:   -Patient was consistently febrile in the late afternoon with rising LFTs and persistent cough.   -ID recommended transition to Meropenem in the setting of continued fevers. Pt switched over to Meropenem(6/3-) for broader antibiotic coverage.   -Viral Hepatitis panel and GGT acquired with RUQ sono all of which were non diagnostic for any liver or gallbladder pathology.   -Pulmonology consulted and recommended cough trial with flonase, benzonate, Duoneb and budesonide.   -CT A/P and LE dopplers were preformed and there were small abdominal collections which IR said there was no safe window for drainage.     On 6/5 Patient was endorsing worsening fatigue with fevers and new morbiliform rash that started on her abdomen and spread to torso, back and medial thigh.   -Repeat cultures were drawn.  -Repeat CT CAP was preformed with negative studies and smaller collection noted in abdomen.   -IR consulted again to evaluate for possible drainage of abscess. Per IR, Pt not a candidate for drainage 2/2 to poor window cw antibx.   - Dermatology consulted, concern for morbiliform drug related rash, recommend starting on triamcinolone 0.1% ointment BID , less concerned for DIHS. ID recommending discontinuing zosyn and stating patient on cipro/flagyl (6/5-).     From 6/7-6/8, pt's rash continued to improve. She remained afebrile. On 6/9, Pt transitioned to oral flagyl and cipro.     Upon discharge on HD# , the patient continued to remain afebrile, is ambulating, voiding spontaneously, tolerating oral intake, pain was well controlled with oral medication, and vital signs were stable. Patient to have close follow up with Dr. Schultz in [facility]yn [facility] office. 30 yo s/p  Ex-Lap, TREVON, BSO, Pelvic and para-aortic LND, Omentectomy, Appendectomy, Frozen = Carcinoma. Patient was discharged on POD 4 with improved pain control and afebrile. At home she was in her usual state of health until 5/29 and presented to the ED 5/30 in the setting of fevers of unknown origin associated with URI symptoms. Patient admitted for infectious workup and antibiotic management.     On 5/31, Hemodynamically stable. Started on Zosyn (5/30-), BCx and UCx drawn. Labs notable for transaminitis and leukocytosis. Pt continued on Apixaban for DVT prophylaxis. ID consulted, recommended Procalcitonin, HIV, acute hep panel, full viral panel, MRSA, cont Zosyn. Chest x-ray wnl, CTAP showed NO evidence of PE,  7.0 x 2.5 x 2.4 cm abscess in the post hysterectomy surgical bed. LE dopplers wnl. Pain well controlled.     On 6/1, Pt was afebrile. Pain well controlled. UCx normal. Leukocytosis resolving. On 6/2, pt with another episode of fever and continued cough. Pt continued on zosyn. Viral panel wnl, MRSA negative. Patient complained of serous discharge which was most likely in the setting of abdominal collection. Tampon test was preformed in the setting of c.f. vesico-vaginal fistula. Test was negative.       6/1-6/3:   -Patient was consistently febrile in the late afternoon with rising LFTs and persistent cough.   -ID recommended transition to Meropenem in the setting of continued fevers. Pt switched over to Meropenem(6/3-) for broader antibiotic coverage.   -Viral Hepatitis panel and GGT acquired with RUQ sono all of which were non diagnostic for any liver or gallbladder pathology.   -Pulmonology consulted and recommended cough trial with flonase, benzonate, Duoneb and budesonide.   -CT A/P and LE dopplers were preformed and there were small abdominal collections which IR said there was no safe window for drainage.     On 6/5 Patient was endorsing worsening fatigue with fevers and new morbiliform rash that started on her abdomen and spread to torso, back and medial thigh.   -Repeat cultures were drawn.  -Repeat CT CAP was preformed with negative studies and smaller collection noted in abdomen.   -IR consulted again to evaluate for possible drainage of abscess. Per IR, Pt not a candidate for drainage 2/2 to poor window cw antibx.   - Dermatology consulted, concern for morbiliform drug related rash, recommend starting on triamcinolone 0.1% ointment BID , less concerned for DIHS. ID recommending discontinuing zosyn and stating patient on cipro/flagyl (6/5-).     From 6/7-6/8, pt's rash continued to improve and her vaginal discharge continues to decrease. She remained afebrile. On 6/9, Pt  was transitioned and tolerated oral flagyl and cipro and remained afebrile.    Upon discharge on HD# 13, the patient continued to remain afebrile, is ambulating, voiding spontaneously, tolerating oral intake, pain was well controlled with oral medication, and vital signs were stable. Patient to have close follow up with Dr. Rhonda Schultz in Gyn Onc office.     Labs             10.3   10.81 )-----------( [id number]      ( 06-11 @ 05:16 )             31.7                10.4   11.91 )-----------( [phone]      ( 06-10 @ 05:40 )             31.6                10.3   12.41 )-----------( [phone]      ( 06-09 @ 05:25 )             31.5

## 2024-05-31 NOTE — CONSULT NOTE ADULT - SUBJECTIVE AND OBJECTIVE BOX
Patient is a 29y old  Female who presents with a chief complaint of fever    HPI:  28 yo POD 9 Ex-Lap (5/21 Total Abdominal Hysterctomy, B/l Salpingoophorectomy, Pelvic and para-aortic Lymph node dissection, Omentectomy, Appendectomy, Frozen = Carcinoma) due to b/l ovarian/adnexal solid and cystic mass found on CT abd/pelv on 5/6/2024. Patient was discharged on POD 4 with improved pain control and afebrile. At home she was in her usual state of health until the day before presentation. She had a temp of 100.7. She took some Tylenol and fever resolved. She later had a temp of 101.5 on 5/29. She called the OB/GYN answering service which advised her to come to ED. She did not come overnight because she felt weak and tired. On the day of presentation she continued to have fevers with temp to 100.4 and 101.2. She endorses URI symptoms, including cough, and fevers before the surgery, prompting evaluation by PCP, who prescribed her amoxicillin. She reports finishing the course of abx before the surgery. Upon work up at [facility] patient found to be tachycardic but afebrile. Labs were significant for WBC of 17.54 now 12.51, PLTS 416 now 337,  now 124,  now 132, alk phos 173, RVP negative, Procal 0.24, and UA not indicative of infection. Patient was further worked up with a CTA chest which was negative for PE but showed multiple b/l lower lobe sub 4mm nodules and CT abd/pelv with IV contrast showing, small free fluid likely postsurgical without discrete rim-enhancing collection, hypoattenuating structure surrounding surgical clips along the course of the ligated right ovarian vein, differential for which includes seroma or lymphangioma versus thrombosis and R hepatic lobe lesion measuring 1.2cm.     REVIEW OF SYSTEMS  pending full examination    prior hospital charts reviewed [V]  primary team notes reviewed [V]  other consultant notes reviewed [V]    PAST MEDICAL & SURGICAL HISTORY:  H/O pelvic mass      Ovarian cystic mass      Urinary tract infection      H/O upper respiratory infection      H/O eczema      History of keloid of skin      No significant past surgical history          SOCIAL HISTORY:  Denied smoking/vaping/alcohol/recreational drug use    FAMILY HISTORY:      Allergies  azithromycin (Hives)  Kiwi (Hives (Mild))        ANTIMICROBIALS:  piperacillin/tazobactam IVPB.. 3.375 every 8 hours      ANTIMICROBIALS (past 90 days):  MEDICATIONS  (STANDING):  piperacillin/tazobactam IVPB..   25 mL/Hr IV Intermittent (05-31-24 @ 09:55)   25 mL/Hr IV Intermittent (05-31-24 @ 02:36)    piperacillin/tazobactam IVPB...   200 mL/Hr IV Intermittent (05-30-24 @ 17:27)        OTHER MEDS:   MEDICATIONS  (STANDING):  acetaminophen     Tablet .. 975 every 6 hours  apixaban 2.5 every 12 hours  ibuprofen  Tablet. 600 every 6 hours  simethicone 80 every 6 hours PRN      VITALS:  Vital Signs Last 24 Hrs  T(F): 98.3 (05-31-24 @ 10:07), Max: 99.6 (05-24-24 @ 21:29)    Vital Signs Last 24 Hrs  HR: 94 (05-31-24 @ 10:07) (84 - 106)  BP: 102/78 (05-31-24 @ 10:07) (102/66 - 119/74)  RR: 18 (05-31-24 @ 10:07)  SpO2: 100% (05-31-24 @ 10:07) (96% - 100%)  Wt(kg): --    EXAM:  pending full examination      Labs:                        10.4   12.51 )-----------( 337      ( 31 May 2024 05:31 )             31.5     05-31    136  |  103  |  6<L>  ----------------------------<  106<H>  3.8   |  22  |  0.70    Ca    8.8      31 May 2024 05:31  Phos  2.6     05-31  Mg     2.10     05-31    TPro  x   /  Alb  x   /  TBili  x   /  DBili  x   /  AST  124<H>  /  ALT  132<H>  /  AlkPhos  x   05-31      WBC Trend:  WBC Count: 12.51 (05-31-24 @ 05:31)  WBC Count: 17.54 (05-30-24 @ 17:46)      Auto Neutrophil #: 9.62 K/uL (05-31-24 @ 05:31)  Auto Neutrophil #: 14.30 K/uL (05-30-24 @ 17:46)  Auto Neutrophil #: 8.47 K/uL (05-23-24 @ 06:06)  Auto Neutrophil #: 13.88 K/uL (05-22-24 @ 05:45)  Auto Neutrophil #: 8.31 K/uL (05-06-24 @ 12:05)      Creatine Trend:  Creatinine: 0.70 (05-31)  Creatinine: 0.77 (05-30)      Liver Biochemical Testing Trend:  Alanine Aminotransferase (ALT/SGPT): 132 *H* (05-31)  Alanine Aminotransferase (ALT/SGPT): 163 *H* (05-30)  Alanine Aminotransferase (ALT/SGPT): 14 (05-06)  Aspartate Aminotransferase (AST/SGOT): 124 (05-31-24 @ 05:31)  Aspartate Aminotransferase (AST/SGOT): 181 (05-30-24 @ 17:46)  Aspartate Aminotransferase (AST/SGOT): 19 (05-06-24 @ 12:05)  Bilirubin Total: 0.6 (05-30)  Bilirubin Total: 0.5 (05-06)      Trend LDH  05-06-24 @ 17:50  273<H>    Auto Eosinophil %: 0.5 % (05-31-24 @ 05:31)  Auto Eosinophil %: 0.1 % (05-30-24 @ 17:46)    MICROBIOLOGY:  Culture - Urine (collected 06 May 2024 12:07)  Source: Clean Catch Clean Catch (Midstream)  Final Report:    <10,000 CFU/mL Normal Urogenital Lilian    Rapid RVP Result: NotDetec (05-30 @ 17:28)    Procalcitonin: 0.24 (05-31)    Troponin T, High Sensitivity Result: <6 (05-30)    Blood Gas Venous - Lactate: 1.1 (05-30 @ 17:47)    A1C with Estimated Average Glucose Result: 5.0 % (05-14-24 @ 17:25)    RADIOLOGY:  < from: CT Abdomen and Pelvis w/ IV Cont (05.30.24 @ 19:24) >  IMPRESSION:  No pulmonary embolism.    Status post hysterectomy and bilateral salpingo-oophorectomy. Small free   fluid likely postsurgical without discrete rim-enhancing collection.    Hypoattenuating structure surrounding surgical clips along the course of   the ligated right ovarian vein, differential for which includes seroma or   lymphangioma versus thrombosis, which is considered less likely.    < from: CT Angio Chest PE Protocol w/ IV Cont (05.30.24 @ 19:24) >  FINDINGS:  CHEST:  LUNGS AND LARGE AIRWAYS: Patent central airways. Redemonstrated multiple   bilateral lower lobe sub-4 mm nodules, without significant interval   change compared with the immediate prior CT chest 5/14/2024.  PLEURA: No pleural effusion.  VESSELS: No pulmonary embolism. Normal caliber thoracic aorta and main   pulmonary artery.  HEART: Heart size is normal. No pericardial effusion.  MEDIASTINUM AND SANDHYA: No lymphadenopathy.  CHEST WALL AND LOWER NECK: Within normal limits.    < from: Xray Chest 1 View AP/PA (05.30.24 @ 18:17) >  IMPRESSION:  Clear lungs.       Patient is a 29y old  Female who presents with a chief complaint of fever    HPI:  30 yo POD 9 Ex-Lap (5/21 Total Abdominal Hysterctomy, B/l Salpingoophorectomy, Pelvic and para-aortic Lymph node dissection, Omentectomy, Appendectomy, Frozen = Carcinoma) due to b/l ovarian/adnexal solid and cystic mass found on CT abd/pelv on 5/6/2024. Patient was discharged on POD 4 with improved pain control and afebrile. At home she was in her usual state of health until the day before presentation. She had a temp of 100.7. She took some Tylenol and fever resolved. She later had a temp of 101.5 on 5/29. She called the OB/GYN answering service which advised her to come to ED. She did not come overnight because she felt weak and tired. On the day of presentation she continued to have fevers with temp to 100.4 and 101.2. She endorses URI symptoms, including cough, and fevers before the surgery, prompting evaluation by PCP, who prescribed her amoxicillin. She reports finishing the course of abx before the surgery. Upon work up at [facility] patient found to be tachycardic but afebrile. Labs were significant for WBC of 17.54 now 12.51, PLTS 416 now 337,  now 124,  now 132, alk phos 173, RVP negative, Procal 0.24, and UA not indicative of infection. Patient was further worked up with a CTA chest which was negative for PE but showed multiple b/l lower lobe sub 4mm nodules and CT abd/pelv with IV contrast showing, small free fluid likely postsurgical without discrete rim-enhancing collection, hypoattenuating structure surrounding surgical clips along the course of the ligated right ovarian vein, differential for which includes seroma or lymphangioma versus thrombosis and R hepatic lobe lesion measuring 1.2cm. Currently patient states that she hasn't had anymore fevers seen being in the hospital. Her main issue is that she continues to cough. She states that she had a cough back before her pervious surgery which she thinks went away when she was given tessalon pearls. She states that her scar will burn when she coughs but other than that her abdomen doesn't bother her.     REVIEW OF SYSTEMS  Constitutional: No fevers, No chills, No weight loss, No fatigue   Skin: No rash, no phlebitis	  Eyes: No discharge, No change in vision	  ENMT: No sore throat, No ulcers  Respiratory: positive cough, no SOB  Cardiovascular:  No chest pain, No palpitations   Gastrointestinal: positive pain, No nausea, No vomiting, No diarrhea, No constipation	  Genitourinary: No dysuria, No frequency, No hesitancy, No flank pain  MSK: No Joint pain, No back pain, No edema  Neurological: No HA, no weakness, no seizures, no AMS     prior hospital charts reviewed [V]  primary team notes reviewed [V]  other consultant notes reviewed [V]    PAST MEDICAL & SURGICAL HISTORY:  H/O pelvic mass      Ovarian cystic mass      Urinary tract infection      H/O upper respiratory infection      H/O eczema      History of keloid of skin      No significant past surgical history          SOCIAL HISTORY:  Denied smoking/vaping/alcohol/recreational drug use, born in , traveled to [place] in 2023, Last travel out of the [facility] Oct 2022, denies sick contacts, denies pets     FAMILY HISTORY:      Allergies  azithromycin (Hives)  Kiwi (Hives (Mild))        ANTIMICROBIALS:  piperacillin/tazobactam IVPB.. 3.375 every 8 hours      ANTIMICROBIALS (past 90 days):  MEDICATIONS  (STANDING):  piperacillin/tazobactam IVPB..   25 mL/Hr IV Intermittent (05-31-24 @ 09:55)   25 mL/Hr IV Intermittent (05-31-24 @ 02:36)    piperacillin/tazobactam IVPB...   200 mL/Hr IV Intermittent (05-30-24 @ 17:27)        OTHER MEDS:   MEDICATIONS  (STANDING):  acetaminophen     Tablet .. 975 every 6 hours  apixaban 2.5 every 12 hours  ibuprofen  Tablet. 600 every 6 hours  simethicone 80 every 6 hours PRN      VITALS:  Vital Signs Last 24 Hrs  T(F): 98.3 (05-31-24 @ 10:07), Max: 99.6 (05-24-24 @ 21:29)    Vital Signs Last 24 Hrs  HR: 94 (05-31-24 @ 10:07) (84 - 106)  BP: 102/78 (05-31-24 @ 10:07) (102/66 - 119/74)  RR: 18 (05-31-24 @ 10:07)  SpO2: 100% (05-31-24 @ 10:07) (96% - 100%)  Wt(kg): --    EXAM:  General: Patient appears comfortable, no acute distress  HEENT: NCAT, PERRL, anicteric sclera, mucous membranes moist and intact  Neck: Supple, No lymphadenopathy  CV: +S1/S2, RRR, no M/R/G  Lungs: No respiratory distress, CTA b/l, no wheezing, rales or rhonchi but decreased at the RLL and LLL  Abd:  BS4+, Soft, NTND, no guarding, surgical scar present in the lower abdomen appears to be well healing without erythema or drainage  : No suprapubic tenderness  Neuro: AAOx3. No focal deficits noted.   Ext: No cyanosis, no edema  Msk: freely moving upper and lower extremities  Skin: No rash, no phlebitis, No erythema     Labs:                        10.4   12.51 )-----------( 337      ( 31 May 2024 05:31 )             31.5     05-31    136  |  103  |  6<L>  ----------------------------<  106<H>  3.8   |  22  |  0.70    Ca    8.8      31 May 2024 05:31  Phos  2.6     05-31  Mg     2.10     05-31    TPro  x   /  Alb  x   /  TBili  x   /  DBili  x   /  AST  124<H>  /  ALT  132<H>  /  AlkPhos  x   05-31      WBC Trend:  WBC Count: 12.51 (05-31-24 @ 05:31)  WBC Count: 17.54 (05-30-24 @ 17:46)      Auto Neutrophil #: 9.62 K/uL (05-31-24 @ 05:31)  Auto Neutrophil #: 14.30 K/uL (05-30-24 @ 17:46)  Auto Neutrophil #: 8.47 K/uL (05-23-24 @ 06:06)  Auto Neutrophil #: 13.88 K/uL (05-22-24 @ 05:45)  Auto Neutrophil #: 8.31 K/uL (05-06-24 @ 12:05)      Creatine Trend:  Creatinine: 0.70 (05-31)  Creatinine: 0.77 (05-30)      Liver Biochemical Testing Trend:  Alanine Aminotransferase (ALT/SGPT): 132 *H* (05-31)  Alanine Aminotransferase (ALT/SGPT): 163 *H* (05-30)  Alanine Aminotransferase (ALT/SGPT): 14 (05-06)  Aspartate Aminotransferase (AST/SGOT): 124 (05-31-24 @ 05:31)  Aspartate Aminotransferase (AST/SGOT): 181 (05-30-24 @ 17:46)  Aspartate Aminotransferase (AST/SGOT): 19 (05-06-24 @ 12:05)  Bilirubin Total: 0.6 (05-30)  Bilirubin Total: 0.5 (05-06)      Trend LDH  05-06-24 @ 17:50  273<H>    Auto Eosinophil %: 0.5 % (05-31-24 @ 05:31)  Auto Eosinophil %: 0.1 % (05-30-24 @ 17:46)    MICROBIOLOGY:  Culture - Urine (collected 06 May 2024 12:07)  Source: Clean Catch Clean Catch (Midstream)  Final Report:    <10,000 CFU/mL Normal Urogenital Lilian    Rapid RVP Result: NotDetec (05-30 @ 17:28)    Procalcitonin: 0.24 (05-31)    Troponin T, High Sensitivity Result: <6 (05-30)    Blood Gas Venous - Lactate: 1.1 (05-30 @ 17:47)    A1C with Estimated Average Glucose Result: 5.0 % (05-14-24 @ 17:25)    RADIOLOGY:  < from: CT Abdomen and Pelvis w/ IV Cont (05.30.24 @ 19:24) >  IMPRESSION:  No pulmonary embolism.    Status post hysterectomy and bilateral salpingo-oophorectomy. Small free   fluid likely postsurgical without discrete rim-enhancing collection.    Hypoattenuating structure surrounding surgical clips along the course of   the ligated right ovarian vein, differential for which includes seroma or   lymphangioma versus thrombosis, which is considered less likely.    < from: CT Angio Chest PE Protocol w/ IV Cont (05.30.24 @ 19:24) >  FINDINGS:  CHEST:  LUNGS AND LARGE AIRWAYS: Patent central airways. Redemonstrated multiple   bilateral lower lobe sub-4 mm nodules, without significant interval   change compared with the immediate prior CT chest 5/14/2024.  PLEURA: No pleural effusion.  VESSELS: No pulmonary embolism. Normal caliber thoracic aorta and main   pulmonary artery.  HEART: Heart size is normal. No pericardial effusion.  MEDIASTINUM AND SANDHYA: No lymphadenopathy.  CHEST WALL AND LOWER NECK: Within normal limits.    < from: Xray Chest 1 View AP/PA (05.30.24 @ 18:17) >  IMPRESSION:  Clear lungs.       Patient is a 29y old  Female who presents with a chief complaint of fever    HPI:  30 yo POD 9 Ex-Lap (5/21 Total Abdominal Hysterectomy B/l Salpingooophorectomy Pelvic and para-aortic Lymph node dissection, Omentectomy, Appendectomy, Frozen = Carcinoma) due to b/l ovarian/adnexal solid and cystic mass found on CT abd/pelv on 5/6/2024. Patient was discharged on POD 4 with improved pain control and afebrile. At home she was in her usual state of health until the day before presentation. She had a temp of 100.7. She took some Tylenol and fever resolved. She later had a temp of 101.5 on 5/29. She called the OB/GYN answering service which advised her to come to ED. She did not come overnight because she felt weak and tired. On the day of presentation she continued to have fevers with temp to 100.4 and 101.2. She endorses URI symptoms, including cough, and fevers before the surgery, prompting evaluation by PCP, who prescribed her amoxicillin. She reports finishing the course of abx before the surgery. Upon work up at [facility] patient found to be tachycardic but afebrile. Labs were significant for WBC of 17.54 now 12.51, PLTS 416 now 337,  now 124,  now 132, alk phos 173, RVP negative, Procal 0.24, and UA not indicative of infection. Patient was further worked up with a CTA chest which was negative for PE but showed multiple b/l lower lobe sub 4mm nodules and CT abd/pelv with IV contrast showing, small free fluid likely postsurgical without discrete rim-enhancing collection, hypoattenuating structure surrounding surgical clips along the course of the ligated right ovarian vein, differential for which includes seroma or lymphangioma versus thrombosis and R hepatic lobe lesion measuring 1.2cm. Currently patient states that she hasn't had anymore fevers seen being in the hospital. Her main issue is that she continues to cough. She states that she had a cough back before her pervious surgery which she thinks went away when she was given tessalon pearls. She states that her scar will burn when she coughs but other than that her abdomen doesn't bother her.     REVIEW OF SYSTEMS  Constitutional: No fevers, No chills, No weight loss, No fatigue   Skin: No rash, no phlebitis	  Eyes: No discharge, No change in vision	  ENMT: No sore throat, No ulcers  Respiratory: positive cough, no SOB  Cardiovascular:  No chest pain, No palpitations   Gastrointestinal: positive pain, No nausea, No vomiting, No diarrhea, No constipation	  Genitourinary: No dysuria, No frequency, No hesitancy, No flank pain  MSK: No Joint pain, No back pain, No edema  Neurological: No HA, no weakness, no seizures, no AMS     prior hospital charts reviewed [V]  primary team notes reviewed [V]  other consultant notes reviewed [V]    PAST MEDICAL & SURGICAL HISTORY:  H/O pelvic mass      Ovarian cystic mass      Urinary tract infection      H/O upper respiratory infection      H/O eczema      History of keloid of skin      No significant past surgical history          SOCIAL HISTORY:  Denied smoking/vaping/alcohol/recreational drug use, born in , traveled to [place] in 2023, Last travel out of the [facility] Oct 2022, denies sick contacts, denies pets     FAMILY HISTORY:      Allergies  azithromycin (Hives)  Kiwi (Hives (Mild))        ANTIMICROBIALS:  piperacillin/tazobactam IVPB.. 3.375 every 8 hours      ANTIMICROBIALS (past 90 days):  MEDICATIONS  (STANDING):  piperacillin/tazobactam IVPB..   25 mL/Hr IV Intermittent (05-31-24 @ 09:55)   25 mL/Hr IV Intermittent (05-31-24 @ 02:36)    piperacillin/tazobactam IVPB...   200 mL/Hr IV Intermittent (05-30-24 @ 17:27)        OTHER MEDS:   MEDICATIONS  (STANDING):  acetaminophen     Tablet .. 975 every 6 hours  apixaban 2.5 every 12 hours  ibuprofen  Tablet. 600 every 6 hours  simethicone 80 every 6 hours PRN      VITALS:  Vital Signs Last 24 Hrs  T(F): 98.3 (05-31-24 @ 10:07), Max: 99.6 (05-24-24 @ 21:29)    Vital Signs Last 24 Hrs  HR: 94 (05-31-24 @ 10:07) (84 - 106)  BP: 102/78 (05-31-24 @ 10:07) (102/66 - 119/74)  RR: 18 (05-31-24 @ 10:07)  SpO2: 100% (05-31-24 @ 10:07) (96% - 100%)  Wt(kg): --    EXAM:  General: Patient appears comfortable, no acute distress  HEENT: NCAT, PERRL, anicteric sclera, mucous membranes moist and intact  Neck: Supple, No lymphadenopathy  CV: +S1/S2, RRR, no M/R/G  Lungs: No respiratory distress, CTA b/l, no wheezing, rales or rhonchi but decreased at the RLL and LLL  Abd:  BS4+, Soft, NTND, no guarding, surgical scar present in the lower abdomen appears to be well healing without erythema or drainage  : No suprapubic tenderness  Neuro: AAOx3. No focal deficits noted.   Ext: No cyanosis, no edema  Msk: freely moving upper and lower extremities  Skin: No rash, no phlebitis, No erythema     Labs:                        10.4   12.51 )-----------( 337      ( 31 May 2024 05:31 )             31.5     05-31    136  |  103  |  6<L>  ----------------------------<  106<H>  3.8   |  22  |  0.70    Ca    8.8      31 May 2024 05:31  Phos  2.6     05-31  Mg     2.10     05-31    TPro  x   /  Alb  x   /  TBili  x   /  DBili  x   /  AST  124<H>  /  ALT  132<H>  /  AlkPhos  x   05-31      WBC Trend:  WBC Count: 12.51 (05-31-24 @ 05:31)  WBC Count: 17.54 (05-30-24 @ 17:46)      Auto Neutrophil #: 9.62 K/uL (05-31-24 @ 05:31)  Auto Neutrophil #: 14.30 K/uL (05-30-24 @ 17:46)  Auto Neutrophil #: 8.47 K/uL (05-23-24 @ 06:06)  Auto Neutrophil #: 13.88 K/uL (05-22-24 @ 05:45)  Auto Neutrophil #: 8.31 K/uL (05-06-24 @ 12:05)      Creatine Trend:  Creatinine: 0.70 (05-31)  Creatinine: 0.77 (05-30)      Liver Biochemical Testing Trend:  Alanine Aminotransferase (ALT/SGPT): 132 *H* (05-31)  Alanine Aminotransferase (ALT/SGPT): 163 *H* (05-30)  Alanine Aminotransferase (ALT/SGPT): 14 (05-06)  Aspartate Aminotransferase (AST/SGOT): 124 (05-31-24 @ 05:31)  Aspartate Aminotransferase (AST/SGOT): 181 (05-30-24 @ 17:46)  Aspartate Aminotransferase (AST/SGOT): 19 (05-06-24 @ 12:05)  Bilirubin Total: 0.6 (05-30)  Bilirubin Total: 0.5 (05-06)      Trend LDH  05-06-24 @ 17:50  273<H>    Auto Eosinophil %: 0.5 % (05-31-24 @ 05:31)  Auto Eosinophil %: 0.1 % (05-30-24 @ 17:46)    MICROBIOLOGY:  Culture - Urine (collected 06 May 2024 12:07)  Source: Clean Catch Clean Catch (Midstream)  Final Report:    <10,000 CFU/mL Normal Urogenital Lilian    Rapid RVP Result: NotDetec (05-30 @ 17:28)    Procalcitonin: 0.24 (05-31)    Troponin T, High Sensitivity Result: <6 (05-30)    Blood Gas Venous - Lactate: 1.1 (05-30 @ 17:47)    A1C with Estimated Average Glucose Result: 5.0 % (05-14-24 @ 17:25)    RADIOLOGY:  < from: CT Abdomen and Pelvis w/ IV Cont (05.30.24 @ 19:24) >  IMPRESSION:  No pulmonary embolism.    Status post hysterectomy and bilateral salpingo-oophorectomy. Small free   fluid likely postsurgical without discrete rim-enhancing collection.    Hypoattenuating structure surrounding surgical clips along the course of   the ligated right ovarian vein, differential for which includes seroma or   lymphangioma versus thrombosis, which is considered less likely.    < from: CT Angio Chest PE Protocol w/ IV Cont (05.30.24 @ 19:24) >  FINDINGS:  CHEST:  LUNGS AND LARGE AIRWAYS: Patent central airways. Redemonstrated multiple   bilateral lower lobe sub-4 mm nodules, without significant interval   change compared with the immediate prior CT chest 5/14/2024.  PLEURA: No pleural effusion.  VESSELS: No pulmonary embolism. Normal caliber thoracic aorta and main   pulmonary artery.  HEART: Heart size is normal. No pericardial effusion.  MEDIASTINUM AND SANDHYA: No lymphadenopathy.  CHEST WALL AND LOWER NECK: Within normal limits.    < from: Xray Chest 1 View AP/PA (05.30.24 @ 18:17) >  IMPRESSION:  Clear lungs.       Patient is a 29y old  Female who presents with a chief complaint of fever    HPI:  30 yo POD 9 Ex-Lap (5/21 Total Abdominal Hysterectomy B/l Salpingooophorectomy Pelvic and para-aortic Lymph node dissection, Omentectomy, Appendectomy, Frozen = Carcinoma) due to b/l ovarian/adnexal solid and cystic mass found on CT abd/pelv on 5/6/2024. Patient was discharged on POD 4 with improved pain control and afebrile. At home she was in her usual state of health until the day before presentation. She had a temp of 100.7. She took some Tylenol and fever resolved. She later had a temp of 101.5 on 5/29. She called the OB/GYN answering service which advised her to come to ED. She did not come overnight because she felt weak and tired. On the day of presentation she continued to have fevers with temp to 100.4 and 101.2. She endorses URI symptoms, including cough, and fevers before the surgery, prompting evaluation by PCP, who prescribed her amoxicillin. She reports finishing the course of abx before the surgery. Upon work up at [facility] patient found to be tachycardic but afebrile. Labs were significant for WBC of 17.54 now 12.51, PLTS 416 now 337,  now 124,  now 132, alk phos 173, RVP negative, Procal 0.24, and UA not indicative of infection. Patient was further worked up with a CTA chest which was negative for PE but showed multiple b/l lower lobe sub 4mm nodules and CT abd/pelv with IV contrast showing, small free fluid likely postsurgical without discrete rim-enhancing collection, hypoattenuating structure surrounding surgical clips along the course of the ligated right ovarian vein, differential for which includes seroma or lymphangioma versus thrombosis and R hepatic lobe lesion measuring 1.2cm. Currently patient states that she hasn't had anymore fevers seen being in the hospital. Her main issue is that she continues to cough. She states that she had a cough back before her pervious surgery which she thinks went away when she was given tessalon pearls. She states that her scar will burn when she coughs but other than that her abdomen doesn't bother her.     REVIEW OF SYSTEMS  Constitutional: No fevers, No chills, No weight loss, No fatigue   Skin: No rash, no phlebitis	  Eyes: No discharge, No change in vision	  ENMT: No sore throat, No ulcers  Respiratory: positive cough, no SOB  Cardiovascular:  No chest pain, No palpitations   Gastrointestinal: positive pain, No nausea, No vomiting, No diarrhea, No constipation	  Genitourinary: No dysuria, No frequency, No hesitancy, No flank pain  MSK: No Joint pain, No back pain, No edema  Neurological: No HA, no weakness, no seizures, no AMS     prior hospital charts reviewed [V]  primary team notes reviewed [V]  other consultant notes reviewed [V]    PAST MEDICAL & SURGICAL HISTORY:  H/O pelvic mass      Ovarian cystic mass      Urinary tract infection      H/O upper respiratory infection      H/O eczema      History of keloid of skin      No significant past surgical history          SOCIAL HISTORY:  Denied smoking/vaping/alcohol/recreational drug use, born in , traveled to [place] in 2023, Last travel out of the [facility] Oct 2022, denies sick contacts, denies pets     FAMILY HISTORY:      Allergies  azithromycin (Hives)  Kiwi (Hives (Mild))        ANTIMICROBIALS:  piperacillin/tazobactam IVPB.. 3.375 every 8 hours      ANTIMICROBIALS (past 90 days):  MEDICATIONS  (STANDING):  piperacillin/tazobactam IVPB..   25 mL/Hr IV Intermittent (05-31-24 @ 09:55)   25 mL/Hr IV Intermittent (05-31-24 @ 02:36)    piperacillin/tazobactam IVPB...   200 mL/Hr IV Intermittent (05-30-24 @ 17:27)        OTHER MEDS:   MEDICATIONS  (STANDING):  acetaminophen     Tablet .. 975 every 6 hours  apixaban 2.5 every 12 hours  ibuprofen  Tablet. 600 every 6 hours  simethicone 80 every 6 hours PRN      VITALS:  Vital Signs Last 24 Hrs  T(F): 98.3 (05-31-24 @ 10:07), Max: 99.6 (05-24-24 @ 21:29)    Vital Signs Last 24 Hrs  HR: 94 (05-31-24 @ 10:07) (84 - 106)  BP: 102/78 (05-31-24 @ 10:07) (102/66 - 119/74)  RR: 18 (05-31-24 @ 10:07)  SpO2: 100% (05-31-24 @ 10:07) (96% - 100%)  Wt(kg): --    EXAM:  General: Patient appears uncomfortable due to cough, non toxic   HEENT: NCAT, PERRL, anicteric sclera, mucous membranes moist and intact  Neck: Supple  CV: +S1/S2, RRR, no M/R/G  Lungs:  decreased BS at the RLL and LLL  Abd:  BS4+, Soft, NTND, no guarding, surgical wound present in the lower abdomen appears to be well healing without erythema or drainage  : No suprapubic tenderness  Neuro: AAOx3. No focal deficits noted.   Ext: No cyanosis, no edema  Msk: freely moving upper and lower extremities  Skin: No rash, no phlebitis, No erythema     Labs:                        10.4   12.51 )-----------( 337      ( 31 May 2024 05:31 )             31.5     05-31    136  |  103  |  6<L>  ----------------------------<  106<H>  3.8   |  22  |  0.70    Ca    8.8      31 May 2024 05:31  Phos  2.6     05-31  Mg     2.10     05-31    TPro  x   /  Alb  x   /  TBili  x   /  DBili  x   /  AST  124<H>  /  ALT  132<H>  /  AlkPhos  x   05-31      WBC Trend:  WBC Count: 12.51 (05-31-24 @ 05:31)  WBC Count: 17.54 (05-30-24 @ 17:46)      Auto Neutrophil #: 9.62 K/uL (05-31-24 @ 05:31)  Auto Neutrophil #: 14.30 K/uL (05-30-24 @ 17:46)  Auto Neutrophil #: 8.47 K/uL (05-23-24 @ 06:06)  Auto Neutrophil #: 13.88 K/uL (05-22-24 @ 05:45)  Auto Neutrophil #: 8.31 K/uL (05-06-24 @ 12:05)      Creatine Trend:  Creatinine: 0.70 (05-31)  Creatinine: 0.77 (05-30)      Liver Biochemical Testing Trend:  Alanine Aminotransferase (ALT/SGPT): 132 *H* (05-31)  Alanine Aminotransferase (ALT/SGPT): 163 *H* (05-30)  Alanine Aminotransferase (ALT/SGPT): 14 (05-06)  Aspartate Aminotransferase (AST/SGOT): 124 (05-31-24 @ 05:31)  Aspartate Aminotransferase (AST/SGOT): 181 (05-30-24 @ 17:46)  Aspartate Aminotransferase (AST/SGOT): 19 (05-06-24 @ 12:05)  Bilirubin Total: 0.6 (05-30)  Bilirubin Total: 0.5 (05-06)      Trend LDH  05-06-24 @ 17:50  273<H>    Auto Eosinophil %: 0.5 % (05-31-24 @ 05:31)  Auto Eosinophil %: 0.1 % (05-30-24 @ 17:46)    MICROBIOLOGY:  Culture - Urine (collected 06 May 2024 12:07)  Source: Clean Catch Clean Catch (Midstream)  Final Report:    <10,000 CFU/mL Normal Urogenital Lilian    Rapid RVP Result: NotDetec (05-30 @ 17:28)    Procalcitonin: 0.24 (05-31)    Troponin T, High Sensitivity Result: <6 (05-30)    Blood Gas Venous - Lactate: 1.1 (05-30 @ 17:47)    A1C with Estimated Average Glucose Result: 5.0 % (05-14-24 @ 17:25)    RADIOLOGY:      < from: CT Abdomen and Pelvis w/ IV Cont (05.30.24 @ 19:24) >  IMPRESSION:  No pulmonary embolism.    Status post hysterectomy and bilateral salpingo-oophorectomy. Small free   fluid likely postsurgical without discrete rim-enhancing collection.    Hypoattenuating structure surrounding surgical clips along the course of   the ligated right ovarian vein, differential for which includes seroma or   lymphangioma versus thrombosis, which is considered less likely.    < from: CT Angio Chest PE Protocol w/ IV Cont (05.30.24 @ 19:24) >  FINDINGS:  CHEST:  LUNGS AND LARGE AIRWAYS: Patent central airways. Redemonstrated multiple   bilateral lower lobe sub-4 mm nodules, without significant interval   change compared with the immediate prior CT chest 5/14/2024.  PLEURA: No pleural effusion.  VESSELS: No pulmonary embolism. Normal caliber thoracic aorta and main   pulmonary artery.  HEART: Heart size is normal. No pericardial effusion.  MEDIASTINUM AND SANDHYA: No lymphadenopathy.  CHEST WALL AND LOWER NECK: Within normal limits.    < from: Xray Chest 1 View AP/PA (05.30.24 @ 18:17) >  IMPRESSION:  Clear lungs.

## 2024-05-31 NOTE — DISCHARGE NOTE PROVIDER - NSDCMRMEDTOKEN_GEN_ALL_CORE_FT
acetaminophen 325 mg oral tablet: 3 tab(s) orally every 6 hours  apixaban 2.5 mg oral tablet: 1 tab(s) orally 2 times a day  benzonatate 200 mg oral capsule: 1 cap(s) orally 3 times a day  benzonatate 200 mg oral capsule: 1 cap(s) orally every 8 hours as needed for  cough  ibuprofen 600 mg oral tablet: 1 tab(s) orally every 6 hours  oxyCODONE 5 mg oral tablet: 1 tab(s) orally every 6 hours as needed for Severe Pain (7 - 10) MDD: 4 tabs per day   acetaminophen 325 mg oral tablet: 3 tab(s) orally every 6 hours  Albuterol (Eqv-ProAir HFA) 90 mcg/inh inhalation aerosol: 2 puff(s) inhaled every 8 hours as needed for  cough  apixaban 2.5 mg oral tablet: 1 tab(s) orally 2 times a day  benzonatate 200 mg oral capsule: 1 cap(s) orally 3 times a day  benzonatate 200 mg oral capsule: 1 cap(s) orally every 8 hours as needed for  cough  ciprofloxacin 500 mg oral tablet: 1 tab(s) orally every 12 hours  ibuprofen 600 mg oral tablet: 1 tab(s) orally every 6 hours  metroNIDAZOLE 500 mg oral tablet: 1 tab(s) orally every 12 hours  oxyCODONE 5 mg oral tablet: 1 tab(s) orally every 6 hours as needed for Severe Pain (7 - 10) MDD: 4 tabs per day  Symbicort 80 mcg-4.5 mcg/inh inhalation aerosol: 2 puff(s) inhaled 2 times a day as needed for  bronchospasm  triamcinolone 0.1% topical ointment: Apply topically to affected area 2 times a day as needed for  itching

## 2024-05-31 NOTE — CONSULT NOTE ADULT - ASSESSMENT
Impression/Hospital Course:  30 yo POD 9 Ex-Lap (5/21 Total Abdominal Hysterctomy, B/l Salpingoophorectomy, Pelvic and para-aortic Lymph node dissection, Omentectomy, Appendectomy, Frozen = Carcinoma) due to b/l ovarian/adnexal solid and cystic mass found on CT abd/pelv on 5/6/2024. Patient was discharged on POD 4 with improved pain control and afebrile. At home she was in her usual state of health until the day before presentation. She had a temp of 100.7. She took some Tylenol and fever resolved. She later had a temp of 101.5 on 5/29. She called the OB/GYN answering service which advised her to come to ED. She did not come overnight because she felt weak and tired. On the day of presentation she continued to have fevers with temp to 100.4 and 101.2. She endorses URI symptoms, including cough, and fevers before the surgery, prompting evaluation by PCP, who prescribed her amoxicillin. She reports finishing the course of abx before the surgery. Upon work up at [facility] patient found to be tachycardic but afebrile. Labs were significant for WBC of 17.54 now 12.51, PLTS 416 now 337,  now 124,  now 132, alk phos 173, RVP negative, Procal 0.24, and UA not indicative of infection. Patient was further worked up with a CTA chest which was negative for PE but showed multiple b/l lower lobe sub 4mm nodules and CT abd/pelv with IV contrast showing, small free fluid likely postsurgical without discrete rim-enhancing collection, hypoattenuating structure surrounding surgical clips along the course of the ligated right ovarian vein, differential for which includes seroma or lymphangioma versus thrombosis and R hepatic lobe lesion measuring 1.2cm.     Antimicrobials:  piperacillin/tazobactam 5/30 - current    Assessment:  *SIRS as evidenced by leukocytosis (improving) and tachycardia with unclear source of infection. Patient with hypoattenuating structure around surgical clips which could be infectious though even a seroma/lymphangioma and thrombosis themselves could cause fever, there is also small free fluid though not enhancing so may just be post op findings. Patient also has new onset transaminitis and elevation in alk phos which could point to biliary system or hepatic system as a source, there is a 1.2 cm lesion seen in the liver but that was seen on previous scan.  *Elevated procal  *Subjective pyrexia with home Tmax of 101.5  *Transaminitis improving  *Thrombocytosis resolved  *Multiple b/l lower lobe sub 4mm pulmonary nodules  *1.2 CM R hepatic lobe lesion  *Elevated  with pelvic mass s/p resection on 5/21    Recommendations: PLEASE DEFER ALL CHANGES IN PLAN UNTIL SIGNED BY ATTENDING. All recommendations are tentative pending Attending Attestation.  - would continue piperacillin/tazobactam while in the hospital  - obtain US of the liver to more accurately assess liver lesion  - obtain serum GGT to rule out gall bladder  - would check hepatitis panel   - follow results of HIV  - outpatient follow up of pulmonary nodules   - continued work up of liver lesion outpatient   - management of mass and elevated Ca125 per gyn onc    Axel Morejon DO, PGY-4   Infectious Disease Fellow  [employer] Teams Preferred  After 5pm/weekends call [phone]  Impression/Hospital Course:  30 yo POD 9 Ex-Lap (5/21 Total Abdominal Hysterctomy, B/l Salpingoophorectomy, Pelvic and para-aortic Lymph node dissection, Omentectomy, Appendectomy, Frozen = Carcinoma) due to b/l ovarian/adnexal solid and cystic mass found on CT abd/pelv on 5/6/2024. Patient was discharged on POD 4 with improved pain control and afebrile. At home she was in her usual state of health until the day before presentation. She had a temp of 100.7. She took some Tylenol and fever resolved. She later had a temp of 101.5 on 5/29. She called the OB/GYN answering service which advised her to come to ED. She did not come overnight because she felt weak and tired. On the day of presentation she continued to have fevers with temp to 100.4 and 101.2. She endorses URI symptoms, including cough, and fevers before the surgery, prompting evaluation by PCP, who prescribed her amoxicillin. She reports finishing the course of abx before the surgery. Upon work up at [facility] patient found to be tachycardic but afebrile. Labs were significant for WBC of 17.54 now 12.51, PLTS 416 now 337,  now 124,  now 132, alk phos 173, RVP negative, Procal 0.24, and UA not indicative of infection. Patient was further worked up with a CTA chest which was negative for PE but showed multiple b/l lower lobe sub 4mm nodules and CT abd/pelv with IV contrast showing, small free fluid likely postsurgical without discrete rim-enhancing collection, hypoattenuating structure surrounding surgical clips along the course of the ligated right ovarian vein, differential for which includes seroma or lymphangioma versus thrombosis and R hepatic lobe lesion measuring 1.2cm. Patient with persistent cough that was present even before initial surgery.    Antimicrobials:  piperacillin/tazobactam 5/30 - current    Assessment:  *SIRS as evidenced by leukocytosis (improving) and tachycardia with unclear source of infection. Patient with hypoattenuating structure around surgical clips which could be infectious though even a seroma/lymphangioma and thrombosis themselves could cause fever, there is also small free fluid though not enhancing so may just be post op findings. Patient also has new onset transaminitis and elevation in alk phos which could point to biliary system or hepatic system as a source, there is a 1.2 cm lesion seen in the liver but that was seen on previous scan.  *Persistent cough with negative RVP, Unclear if could be due to pulmonary nodules or other etiology  *Elevated procal  *Subjective pyrexia with home Tmax of 101.5  *Transaminitis improving  *Thrombocytosis resolved  *Multiple b/l lower lobe sub 4mm pulmonary nodules  *1.2 CM R hepatic lobe lesion  *Elevated  with pelvic mass s/p resection on 5/21    Recommendations:  - would continue piperacillin/tazobactam while in the hospital  - obtain US of the liver to more accurately assess liver lesion  - Obtain Transvaginal US to assess fluid found on CT to see if it is a close  - Obtain US of b/l LE to rule out DVT  - obtain serum GGT to rule out gall bladder  - follow results of HIV  - outpatient follow up of pulmonary nodules   - continued work up of liver lesion outpatient   - management of mass and elevated Ca125 per gyn onc    Axel Morejon DO, PGY-4   Infectious Disease Fellow  [employer] Teams Preferred  After 5pm/weekends call [phone]  Impression/Hospital Course:  28 yo POD 9 Ex-Lap (5/21 Total Abdominal Hysterectomy B/l Salpingooophorectomy Pelvic and para-aortic Lymph node dissection, Omentectomy, Appendectomy, Frozen = Carcinoma) due to b/l ovarian/adnexal solid and cystic mass found on CT abd/pelv on 5/6/2024. Patient was discharged on POD 4 with improved pain control and afebrile. At home she was in her usual state of health until the day before presentation. She had a temp of 100.7. She took some Tylenol and fever resolved. She later had a temp of 101.5 on 5/29. She called the OB/GYN answering service which advised her to come to ED. She did not come overnight because she felt weak and tired. On the day of presentation she continued to have fevers with temp to 100.4 and 101.2. She endorses URI symptoms, including cough, and fevers before the surgery, prompting evaluation by PCP, who prescribed her amoxicillin. She reports finishing the course of abx before the surgery. Upon work up at [facility] patient found to be tachycardic but afebrile. Labs were significant for WBC of 17.54 now 12.51, PLTS 416 now 337,  now 124,  now 132, alk phos 173, RVP negative, Procal 0.24, and UA not indicative of infection. Patient was further worked up with a CTA chest which was negative for PE but showed multiple b/l lower lobe sub 4mm nodules and CT abd/pelv with IV contrast showing, small free fluid likely postsurgical without discrete rim-enhancing collection, hypoattenuating structure surrounding surgical clips along the course of the ligated right ovarian vein, differential for which includes seroma or lymphangioma versus thrombosis and R hepatic lobe lesion measuring 1.2cm. Patient with persistent cough that was present even before initial surgery.    Antimicrobials:  piperacillin/tazobactam 5/30 - current    Assessment:  *SIRS as evidenced by leukocytosis (improving) and tachycardia with unclear source of infection. Patient with hypoattenuating structure around surgical clips which could be infectious though even a seroma/lymphangioma and thrombosis themselves could cause fever, there is also small free fluid though not enhancing so may just be post op findings. Patient also has new onset transaminitis and elevation in alk phos which could point to biliary system or hepatic system as a source, there is a 1.2 cm lesion seen in the liver but that was seen on previous scan.  *Persistent cough with negative RVP, Unclear if could be due to pulmonary nodules or other etiology  *Elevated procal  *Subjective pyrexia with home Tmax of 101.5  *Transaminitis improving  *Thrombocytosis resolved  *Multiple b/l lower lobe sub 4mm pulmonary nodules  *1.2 CM R hepatic lobe lesion  *Elevated  with pelvic mass s/p resection on 5/21    Recommendations:  - would continue piperacillin/tazobactam while in the hospital  - obtain US of the liver to more accurately assess liver lesion  - Obtain Transvaginal US to assess fluid found on CT to see if it is a close  - Obtain US of b/l LE to rule out DVT  - obtain serum GGT to rule out gall bladder  - follow results of HIV  - follow blood cultures (received by lab with results pending), adjust antimicrobial therapy based off of culture and sensitivity   - outpatient follow up of pulmonary nodules   - continued work up of liver lesion outpatient   - management of mass and elevated Ca125 per gyn onc    Axel Morejon DO, PGY-4   Infectious Disease Fellow  [facility] Teams Preferred  After 5pm/weekends call [phone]  Impression/Hospital Course:  28 yo POD 9 Ex-Lap (5/21 Total Abdominal Hysterectomy B/l Salpingooophorectomy Pelvic and para-aortic Lymph node dissection, Omentectomy, Appendectomy, Frozen = Carcinoma) due to b/l ovarian/adnexal solid and cystic mass found on CT abd/pelv on 5/6/2024. Patient was discharged on POD 4 with improved pain control and afebrile. At home she was in her usual state of health until the day before presentation. She had a temp of 100.7. She took some Tylenol and fever resolved. She later had a temp of 101.5 on 5/29. She called the OB/GYN answering service which advised her to come to ED. She did not come overnight because she felt weak and tired. On the day of presentation she continued to have fevers with temp to 100.4 and 101.2. She endorses URI symptoms, including cough, and fevers before the surgery, prompting evaluation by PCP, who prescribed her amoxicillin. She reports finishing the course of abx before the surgery. Upon work up at [facility] patient found to be tachycardic but afebrile. Labs were significant for WBC of 17.54 now 12.51, PLTS 416 now 337,  now 124,  now 132, alk phos 173, RVP negative, Procal 0.24, and UA not indicative of infection. Patient was further worked up with a CTA chest which was negative for PE but showed multiple b/l lower lobe sub 4mm nodules and CT abd/pelv with IV contrast showing, small free fluid likely postsurgical without discrete rim-enhancing collection, hypoattenuating structure surrounding surgical clips along the course of the ligated right ovarian vein, differential for which includes seroma or lymphangioma versus thrombosis and R hepatic lobe lesion measuring 1.2cm. Patient with persistent cough that was present even before initial surgery.    Antimicrobials:  piperacillin/tazobactam 5/30 - current    Assessment:  *SIRS as evidenced by leukocytosis (improving) and tachycardia with unclear source of infection. Patient with hypoattenuating structure around surgical clips which could be infectious though even a seroma/lymphangioma and thrombosis themselves could cause fever, there is also small free fluid though not enhancing so may just be post op findings. Patient also has new onset transaminitis and elevation in alk phos which could point to biliary system or hepatic system as a source, there is a 1.2 cm lesion seen in the liver but that was seen on previous scan.  *Persistent cough with negative RVP, Unclear if could be due to pulmonary nodules or other etiology  *Elevated procal  *Subjective pyrexia with home Tmax of 101.5  *Transaminitis improving  *Thrombocytosis resolved  *Multiple b/l lower lobe sub 4mm pulmonary nodules  *1.2 CM R hepatic lobe lesion  *Elevated  with pelvic mass s/p resection on 5/21    Recommendations:  - would continue piperacillin/tazobactam   - obtain US of the liver to more accurately assess liver lesion  - Obtain pelvic US to assess fluid found on CT   - Obtain US of b/l LE to rule out DVT  - obtain serum GGT to rule out gall bladder  - follow blood cultures (received by lab with results pending), adjust antimicrobial therapy based off of culture and sensitivity   - follow up of pulmonary nodules   - management of mass and elevated Ca125 per gyn onc    Axel Morejon DO, PGY-4   Infectious Disease Fellow  [employer] Teams Preferred  After 5pm/weekends call [phone]

## 2024-05-31 NOTE — PROGRESS NOTE ADULT - SUBJECTIVE AND OBJECTIVE BOX
R3 [facility] Progress Note    POD#10   HD#2    Patient seen and examined at bedside.  No acute events overnight. Reports subjective fevers but did not have a fever. Reports non productive cough. Reports abdominal discomfort with coughing but pain is well controlled. Denies any urinary complaints, VB, abnormal vaginal discharge, nausea, vomiting, diarrhea, CP, or other complaints.     Vital Signs Last 24 Hours  T(C): 37.2 (05-31-24 @ 06:07), Max: 37.6 (05-30-24 @ 21:27)  HR: 101 (05-31-24 @ 06:07) (84 - 106)  BP: 102/66 (05-31-24 @ 06:07) (102/66 - 119/74)  RR: 18 (05-31-24 @ 06:07) (15 - 20)  SpO2: 96% (05-31-24 @ 06:07) (96% - 100%)    I&O's Summary    30 May 2024 07:01  -  31 May 2024 06:53  --------------------------------------------------------  IN: 340 mL / OUT: 600 mL / NET: -260 mL        Physical Exam:  General: NAD  CV: RRR  Lungs: CTA b/l  Abdomen: Soft, non-tender diffusely, non distended, hypoactive bowel sounds, abdominal binder in place  Incision: Midline vertical incision, CDI, dermabond prineo in place   Ext: No pain or edema, SCD's in place    Labs:                        10.4   12.51 )-----------( 337      ( 31 May 2024 05:31 )             31.5   baso 0.6    eos 0.5    imm gran 0.7    lymph 14.5   mono 6.8    poly 76.9                         12.0   17.54 )-----------( 416      ( 30 May 2024 17:46 )             36.5   baso 0.3    eos 0.1    imm gran 1.3    lymph 10.6   mono 6.1    poly 81.6       MEDICATIONS  (STANDING):  acetaminophen     Tablet .. 975 milliGRAM(s) Oral every 6 hours  apixaban 2.5 milliGRAM(s) Oral every 12 hours  benzocaine/menthol Lozenge 1 Lozenge Oral every 4 hours  ibuprofen  Tablet. 600 milliGRAM(s) Oral every 6 hours  piperacillin/tazobactam IVPB.. 3.375 Gram(s) IV Intermittent every 8 hours    MEDICATIONS  (PRN):  simethicone 80 milliGRAM(s) Chew every 6 hours PRN Gas

## 2024-05-31 NOTE — PROGRESS NOTE ADULT - ATTENDING COMMENTS
continue zosyn  appreciate ID input  follow pelvic collection, if febrile, will consider ir drainage

## 2024-05-31 NOTE — DISCHARGE NOTE PROVIDER - NSDCFUSCHEDAPPT_GEN_ALL_CORE_FT
[facility]  [facility] [address]  Scheduled Appointment: 06/03/2024     Virgil Cid  [facility]  [address]  Scheduled Appointment: 06/28/2024

## 2024-05-31 NOTE — PROGRESS NOTE ADULT - ASSESSMENT
28 yo POD 10 s/p  Ex-Lap, TREVON, BSO, Pelvic and para-aortic LND, Omentectomy, Appendectomy, Frozen = Carcinoma. Patient was discharged on POD 4 with improved pain control and afebrile. At home she was in her usual state of health until 5/29 and presented to the ED yesterday in the setting of fevers of unknown origin. Patient recently post-op with URI symptoms. Unclear whether fevers are from respiratory infection vs SSI. CTA neg for PE. CT Abd/Pelvis reveals post-surgical changes with possible seroma in R adnexa. UA neg and RVP neg thus far. Patient admitted for further evaluation and treatment with IV abx.     Neuro: PO acetaminophen and ibuprofen PRN  CV: Hemodynamically stable   Pulm: O2 sat WNL on RA. Continue encouraging IS use  - throat lozenges for cough support  - CTA with no PE  - CXR with clear lungs  GI: Tolerating regular diet  : Voiding spontaneously  Heme: apixaban 2.5mg BID; SCDs while in bed  ID: appreciate ID recommendations  - Cont Zosyn (5/30 -)  - Leukocytosis downtrending 17->12.51  - Procalcitonin 0.24, full viral panel neg   - f/u acute hepatitis panel, HIV, and MRSA swab  - F/u BCx and UCx (5/30)  - continue to monitor fever curve  FEN: SLIV, Replete electrolytes PRN     Dispo: continue inpatient management    Huyen Short, PGY-4   Seen with GYN ONC team

## 2024-05-31 NOTE — DISCHARGE NOTE PROVIDER - NSDCFUADDINST_GEN_ALL_CORE_FT
For pain control,    •	Take Ibuprofen 600mg every 6 hours and/or Tylenol 975mg every 6 hours  •	Vaginal soreness/irritation- can occur in the first few days after surgery because of the instruments that were used in the vagina. Soreness can be treated with ice pack and irritation can be taken care of with an emollient such as balmex or aquaphor that you can put directly on the irritated area.    Restrictions: Until cleared by your gyn oncologist after  the surgery you should avoid the following:    •	Tampons  •	Sex  •	Vigorous gym exercise  •	Swimming  pools and tub baths  •	Wait a day or two before going back to work    Diet:   •	Resume Regular diet but Progress diet slowly     Physician Notification- Warning signs to look out for  •	Heavy Vaginal Bleeding   •	Shortness of breath or chest pain  •	Severe Abdominal Pain  •	Persistent nausea and vomiting  •	Pain not relieved by medications  •	Fever greater than 100.4®F  •	Inability to tolerate liquids or foods  •	Unable to urinate after 8 hours For antibiotic management  Continue on Flagyl 500mg q12h and ciprofloxacin 500mg q12h    For pain control,    •	Take Ibuprofen 600mg every 6 hours and/or Tylenol 975mg every 6 hours  •	Vaginal soreness/irritation- can occur in the first few days after surgery because of the instruments that were used in the vagina. Soreness can be treated with ice pack and irritation can be taken care of with an emollient such as balmex or aquaphor that you can put directly on the irritated area.    Restrictions: Until cleared by your gyn oncologist after  the surgery you should avoid the following:    •	Tampons  •	Sex  •	Vigorous gym exercise  •	Swimming  pools and tub baths  •	Wait a day or two before going back to work    Diet:   •	Resume Regular diet but Progress diet slowly     Physician Notification- Warning signs to look out for  •	Heavy Vaginal Bleeding   •	Shortness of breath or chest pain  •	Severe Abdominal Pain  •	Persistent nausea and vomiting  •	Pain not relieved by medications  •	Fever greater than 100.4®F  •	Inability to tolerate liquids or foods  •	Unable to urinate after 8 hours For antibiotic management  Continue on Flagyl 500mg every 12hours and ciprofloxacin 500mg every 12 hours. Your last dose of these medications is 6/17/24.    Anticoagulation  Please continue taking Eliquis 2.5 mg twice a day until 6/22/24. This medication was given to you at your last hospital stay. You should not have any remaining pills left after 6/22/24.    Pulmonary medications  Please take Symbicort 80 mg(twice a day), Albuterol 90mg(once a day), Benzonatate 100mg(every 8 hrs) medications as needed for remaining cough symptoms. You have an appointment scheduled with Dr. Virgil Cid(pulmonologist) on 6/28/24.     Dermatology medication  Please apply Triamcinolone 0.1% ointment as needed for itching/rashes.    For pain control,    •	Take Ibuprofen 600mg every 6 hours and/or Tylenol 975mg every 6 hours  •	Vaginal soreness/irritation- can occur in the first few days after surgery because of the instruments that were used in the vagina. Soreness can be treated with ice pack and irritation can be taken care of with an emollient such as balmex or aquaphor that you can put directly on the irritated area.    Restrictions: Until cleared by your gyn oncologist after  the surgery you should avoid the following:    •	Tampons  •	Sex  •	Vigorous gym exercise  •	Swimming  pools and tub baths  •	Wait a day or two before going back to work    Diet:   •	Resume Regular diet but Progress diet slowly     Physician Notification- Warning signs to look out for  •	Heavy Vaginal Bleeding   •	Shortness of breath or chest pain  •	Severe Abdominal Pain  •	Persistent nausea and vomiting  •	Pain not relieved by medications  •	Fever greater than 100.4®F  •	Inability to tolerate liquids or foods  •	Unable to urinate after 8 hours

## 2024-05-31 NOTE — DISCHARGE NOTE PROVIDER - CARE PROVIDER_API CALL
Rhonda Schultz  Gynecologic Oncology  [address] [place] [id number]  Phone: [phone]  Fax: [phone]  Follow Up Time:    Rhonda Schultz  Gynecologic Oncology  [address] [place] [id number]  Phone: [phone]  Fax: [phone]  Follow Up Time: 2 weeks

## 2024-06-01 ENCOUNTER — RESULT REVIEW (OUTPATIENT)
Age: 29
End: 2024-06-01

## 2024-06-01 LAB
ALBUMIN SERPL ELPH-MCNC: 3.6 G/DL — SIGNIFICANT CHANGE UP (ref 3.3–5)
ALP SERPL-CCNC: 257 U/L — HIGH (ref 40–120)
ALT FLD-CCNC: 285 U/L — HIGH (ref 4–33)
ANION GAP SERPL CALC-SCNC: 17 MMOL/L — HIGH (ref 7–14)
AST SERPL-CCNC: 337 U/L — HIGH (ref 4–32)
BASOPHILS # BLD AUTO: 0.07 K/UL — SIGNIFICANT CHANGE UP (ref 0–0.2)
BASOPHILS NFR BLD AUTO: 0.7 % — SIGNIFICANT CHANGE UP (ref 0–2)
BILIRUB SERPL-MCNC: 0.7 MG/DL — SIGNIFICANT CHANGE UP (ref 0.2–1.2)
BUN SERPL-MCNC: 7 MG/DL — SIGNIFICANT CHANGE UP (ref 7–23)
CALCIUM SERPL-MCNC: 9.2 MG/DL — SIGNIFICANT CHANGE UP (ref 8.4–10.5)
CHLORIDE SERPL-SCNC: 102 MMOL/L — SIGNIFICANT CHANGE UP (ref 98–107)
CO2 SERPL-SCNC: 19 MMOL/L — LOW (ref 22–31)
CREAT SERPL-MCNC: 0.67 MG/DL — SIGNIFICANT CHANGE UP (ref 0.5–1.3)
EGFR: 120 ML/MIN/1.73M2 — SIGNIFICANT CHANGE UP
EOSINOPHIL # BLD AUTO: 0.17 K/UL — SIGNIFICANT CHANGE UP (ref 0–0.5)
EOSINOPHIL NFR BLD AUTO: 1.6 % — SIGNIFICANT CHANGE UP (ref 0–6)
GGT SERPL-CCNC: 266 U/L — HIGH (ref 5–36)
GLUCOSE SERPL-MCNC: 96 MG/DL — SIGNIFICANT CHANGE UP (ref 70–99)
HCT VFR BLD CALC: 31.6 % — LOW (ref 34.5–45)
HGB BLD-MCNC: 10.6 G/DL — LOW (ref 11.5–15.5)
HIV 1+2 AB+HIV1 P24 AG SERPL QL IA: SIGNIFICANT CHANGE UP
IANC: 7.9 K/UL — HIGH (ref 1.8–7.4)
IMM GRANULOCYTES NFR BLD AUTO: 0.8 % — SIGNIFICANT CHANGE UP (ref 0–0.9)
LYMPHOCYTES # BLD AUTO: 1.55 K/UL — SIGNIFICANT CHANGE UP (ref 1–3.3)
LYMPHOCYTES # BLD AUTO: 14.6 % — SIGNIFICANT CHANGE UP (ref 13–44)
MCHC RBC-ENTMCNC: 28.2 PG — SIGNIFICANT CHANGE UP (ref 27–34)
MCHC RBC-ENTMCNC: 33.5 GM/DL — SIGNIFICANT CHANGE UP (ref 32–36)
MCV RBC AUTO: 84 FL — SIGNIFICANT CHANGE UP (ref 80–100)
MONOCYTES # BLD AUTO: 0.81 K/UL — SIGNIFICANT CHANGE UP (ref 0–0.9)
MONOCYTES NFR BLD AUTO: 7.6 % — SIGNIFICANT CHANGE UP (ref 2–14)
NEUTROPHILS # BLD AUTO: 7.9 K/UL — HIGH (ref 1.8–7.4)
NEUTROPHILS NFR BLD AUTO: 74.7 % — SIGNIFICANT CHANGE UP (ref 43–77)
NRBC # BLD: 0 /100 WBCS — SIGNIFICANT CHANGE UP (ref 0–0)
NRBC # FLD: 0 K/UL — SIGNIFICANT CHANGE UP (ref 0–0)
PLATELET # BLD AUTO: 381 K/UL — SIGNIFICANT CHANGE UP (ref 150–400)
POTASSIUM SERPL-MCNC: 3.9 MMOL/L — SIGNIFICANT CHANGE UP (ref 3.5–5.3)
POTASSIUM SERPL-SCNC: 3.9 MMOL/L — SIGNIFICANT CHANGE UP (ref 3.5–5.3)
PROT SERPL-MCNC: 7.2 G/DL — SIGNIFICANT CHANGE UP (ref 6–8.3)
RBC # BLD: 3.76 M/UL — LOW (ref 3.8–5.2)
RBC # FLD: 13.6 % — SIGNIFICANT CHANGE UP (ref 10.3–14.5)
SODIUM SERPL-SCNC: 138 MMOL/L — SIGNIFICANT CHANGE UP (ref 135–145)
WBC # BLD: 10.59 K/UL — HIGH (ref 3.8–10.5)
WBC # FLD AUTO: 10.59 K/UL — HIGH (ref 3.8–10.5)

## 2024-06-01 PROCEDURE — 99223 1ST HOSP IP/OBS HIGH 75: CPT | Mod: GC

## 2024-06-01 PROCEDURE — 99232 SBSQ HOSP IP/OBS MODERATE 35: CPT

## 2024-06-01 PROCEDURE — 76705 ECHO EXAM OF ABDOMEN: CPT | Mod: 26

## 2024-06-01 PROCEDURE — 93970 EXTREMITY STUDY: CPT | Mod: 26

## 2024-06-01 RX ORDER — DIPHENHYDRAMINE HCL 50 MG
25 CAPSULE ORAL EVERY 6 HOURS
Refills: 0 | Status: DISCONTINUED | OUTPATIENT
Start: 2024-06-01 | End: 2024-06-01

## 2024-06-01 RX ORDER — SODIUM CHLORIDE 0.65 %
1 AEROSOL, SPRAY (ML) NASAL
Refills: 0 | Status: DISCONTINUED | OUTPATIENT
Start: 2024-06-01 | End: 2024-06-05

## 2024-06-01 RX ORDER — DIPHENHYDRAMINE HCL 50 MG
25 CAPSULE ORAL EVERY 6 HOURS
Refills: 0 | Status: DISCONTINUED | OUTPATIENT
Start: 2024-06-01 | End: 2024-06-11

## 2024-06-01 RX ORDER — PIPERACILLIN AND TAZOBACTAM 4; .5 G/20ML; G/20ML
3.38 INJECTION, POWDER, LYOPHILIZED, FOR SOLUTION INTRAVENOUS EVERY 8 HOURS
Refills: 0 | Status: DISCONTINUED | OUTPATIENT
Start: 2024-06-01 | End: 2024-06-03

## 2024-06-01 RX ORDER — PIPERACILLIN AND TAZOBACTAM 4; .5 G/20ML; G/20ML
3.38 INJECTION, POWDER, LYOPHILIZED, FOR SOLUTION INTRAVENOUS EVERY 8 HOURS
Refills: 0 | Status: DISCONTINUED | OUTPATIENT
Start: 2024-06-01 | End: 2024-06-01

## 2024-06-01 RX ADMIN — PIPERACILLIN AND TAZOBACTAM 25 GRAM(S): 4; .5 INJECTION, POWDER, LYOPHILIZED, FOR SOLUTION INTRAVENOUS at 10:03

## 2024-06-01 RX ADMIN — BENZOCAINE AND MENTHOL 1 LOZENGE: 5; 1 LIQUID ORAL at 10:03

## 2024-06-01 RX ADMIN — SIMETHICONE 80 MILLIGRAM(S): 80 TABLET, CHEWABLE ORAL at 10:13

## 2024-06-01 RX ADMIN — Medication 1 SPRAY(S): at 06:47

## 2024-06-01 RX ADMIN — Medication 25 MILLIGRAM(S): at 22:30

## 2024-06-01 RX ADMIN — APIXABAN 2.5 MILLIGRAM(S): 2.5 TABLET, FILM COATED ORAL at 18:06

## 2024-06-01 RX ADMIN — Medication 600 MILLIGRAM(S): at 18:00

## 2024-06-01 RX ADMIN — Medication 600 MILLIGRAM(S): at 17:03

## 2024-06-01 RX ADMIN — PIPERACILLIN AND TAZOBACTAM 25 GRAM(S): 4; .5 INJECTION, POWDER, LYOPHILIZED, FOR SOLUTION INTRAVENOUS at 18:06

## 2024-06-01 RX ADMIN — Medication 600 MILLIGRAM(S): at 05:51

## 2024-06-01 RX ADMIN — BENZOCAINE AND MENTHOL 1 LOZENGE: 5; 1 LIQUID ORAL at 18:06

## 2024-06-01 RX ADMIN — BENZOCAINE AND MENTHOL 1 LOZENGE: 5; 1 LIQUID ORAL at 13:49

## 2024-06-01 RX ADMIN — PIPERACILLIN AND TAZOBACTAM 25 GRAM(S): 4; .5 INJECTION, POWDER, LYOPHILIZED, FOR SOLUTION INTRAVENOUS at 01:56

## 2024-06-01 RX ADMIN — BENZOCAINE AND MENTHOL 1 LOZENGE: 5; 1 LIQUID ORAL at 21:27

## 2024-06-01 RX ADMIN — SIMETHICONE 80 MILLIGRAM(S): 80 TABLET, CHEWABLE ORAL at 19:07

## 2024-06-01 RX ADMIN — BENZOCAINE AND MENTHOL 1 LOZENGE: 5; 1 LIQUID ORAL at 02:11

## 2024-06-01 RX ADMIN — APIXABAN 2.5 MILLIGRAM(S): 2.5 TABLET, FILM COATED ORAL at 05:51

## 2024-06-01 RX ADMIN — BENZOCAINE AND MENTHOL 1 LOZENGE: 5; 1 LIQUID ORAL at 05:53

## 2024-06-01 NOTE — PROGRESS NOTE ADULT - ATTENDING COMMENTS
Patient feeling better, no fevers inhouse  will complete ID work up  transition to PO abx  possible dc home tomorrow  await final cultures  pulm f/u outpatient.

## 2024-06-01 NOTE — PROVIDER CONTACT NOTE (OTHER) - REASON
YD=641.7, Tylenol clarified for elevated liver enzymes. UR=161.7, Tylenol clarified for elevated liver enzymes, L calf numbness

## 2024-06-01 NOTE — CHART NOTE - NSCHARTNOTEFT_GEN_A_CORE
Patient seen and examined at bedside, patient commenting on serous vaginal discharge.   Vital Signs Last 24 Hours  T(C): 36.9 (06-02-24 @ 01:46), Max: 38.2 (06-01-24 @ 17:43)  HR: 76 (06-02-24 @ 01:46) (76 - 100)  BP: 105/70 (06-02-24 @ 01:46) (96/61 - 115/67)  RR: 17 (06-02-24 @ 01:46) (16 - 17)  SpO2: 100% (06-02-24 @ 01:46) (98% - 100%)    I&O's Summary    31 May 2024 07:01  -  01 Jun 2024 07:00  --------------------------------------------------------  IN: 1660 mL / OUT: 1350 mL / NET: 310 mL    01 Jun 2024 07:01  -  02 Jun 2024 02:55  --------------------------------------------------------  IN: 1690 mL / OUT: 2200 mL / NET: -510 mL        Physical Exam:  Spec: Vaginal cuff visualized and intact with small amount of yellow serous discharge noted. Discharge increased with valsava  Bimanual exam with intact cuff    Labs:             10.6<L>  10.59<H> )-----------( 381      ( 06-01 @ 05:46 )             31.6<L>               10.4<L>  12.51<H> )-----------( 337      ( 05-31 @ 05:31 )             31.5<L>               12.0   17.54<H> )-----------( 416<H>    ( 05-30 @ 17:46 )             36.5         MEDICATIONS  (STANDING):  acetaminophen     Tablet .. 975 milliGRAM(s) Oral every 6 hours  apixaban 2.5 milliGRAM(s) Oral every 12 hours  benzocaine/menthol Lozenge 1 Lozenge Oral every 4 hours  famotidine    Tablet 20 milliGRAM(s) Oral daily  guaiFENesin  milliGRAM(s) Oral every 12 hours  ibuprofen  Tablet. 600 milliGRAM(s) Oral every 6 hours  piperacillin/tazobactam IVPB.. 3.375 Gram(s) IV Intermittent every 8 hours  sodium chloride 0.65% Nasal 1 Spray(s) Both Nostrils every 3 hours    MEDICATIONS  (PRN):  diphenhydrAMINE 25 milliGRAM(s) Oral every 6 hours PRN cough  simethicone 80 milliGRAM(s) Chew every 6 hours PRN Gas      Patient is a 30 yo POD#11 s/p  Ex-Lap, TREVON, BSO, Pelvic and para-aortic LND, Omentectomy, Appendectomy who is readmitted with postoperative fevers of unknown origin.  Patient evaluated for vaginal discharge. Concern for draining seroma vs  fistula.  -Plan for further eval tomorrow with fluid analysis (creatinine) from vagina vs double dye test.  -Patient with intact cuff healing appropriately    Raquel PGY2  dw Dr. Holm

## 2024-06-01 NOTE — PROVIDER CONTACT NOTE (OTHER) - REASON
L palm itchiness and discomfort when palpated L palm itchiness and discomfort when palpated, blood-tinged urine

## 2024-06-01 NOTE — CHART NOTE - NSCHARTNOTEFT_GEN_A_CORE
Patient seen and evaluated at bedside due to fever.    INCOMPLETE NOTE Patient seen and evaluated at bedside due to fever to 100.7F at 5pm.  Patient reports she felt feverish with "chills" when she went for LE dopplers, but reports that her symptoms have somewhat resolved since returning to floor.  Denies lightheadedness, dizziness, chest pain, shortness of breath.  Patient additionally reports that her L palm has been slightly itchy today with pain on palpation.      VS with fever:   T: 100.7  HR: 100  BP: 104/64  SpO2: 100% RA    PHYSICAL EXAM:   Gen: NAD, alert and oriented x 3  Cardiovascular: clinically well perfused   Respiratory: breathing comfortably on RA  Extremities: L palm with no erythema or edema    A/P: Patient is a 28 yo POD#11 s/p  Ex-Lap, TREVON, BSO, Pelvic and para-aortic LND, Omentectomy, Appendectomy who is readmitted with postoperative fevers of unknown origin.  Plan for today had been to transition to PO Augmentin given no recent fevers and downtrending WBC, however patient now febrile to 100.7F with tachycardia  - PO Motrin  - Remain on Zosyn  - Pulm consult pending  - ID following  - LE dopplers pending  - f/u AM CBC     d/w Dr. Holm, Gyn Onc Fellow  RONI Stephenson PGY2 Patient seen and evaluated at bedside due to fever to 100.7F at 5pm.  Patient reports she felt feverish with "chills" when she went for LE dopplers, but reports that her symptoms have somewhat resolved since returning to floor.  Denies lightheadedness, dizziness, chest pain, shortness of breath.  Patient additionally reports that her L palm has been slightly itchy today with pain on palpation.      VS with fever:   T: 100.7  HR: 100  BP: 104/64  SpO2: 100% RA    PHYSICAL EXAM:   Gen: NAD, alert and oriented x 3  Cardiovascular: clinically well perfused   Respiratory: breathing comfortably on RA  Extremities: L palm with no erythema or edema    A/P: Patient is a 30 yo POD#11 s/p  Ex-Lap, TREVON, BSO, Pelvic and para-aortic LND, Omentectomy, Appendectomy who is readmitted with postoperative fevers of unknown origin.  Plan for today had been to transition to PO Augmentin given no recent fevers and downtrending WBC, however patient now febrile to 100.7F with tachycardia  - PO Motrin  - Remain on Zosyn  - Pulm consult pending  - ID following  - LE dopplers pending  - f/u AM CBC   - Benadryl PRN for palmar itchiness    d/w Dr. Holm, Gyn Onc Fellow  RONI Stephenson PGY2

## 2024-06-01 NOTE — PROVIDER CONTACT NOTE (OTHER) - ASSESSMENT
Pt. w/ vaginal pinkish discharge, Pad fully saturated and still going accompanied w/ abdl. discomfort

## 2024-06-01 NOTE — CONSULT NOTE ADULT - ASSESSMENT
30 yo POD 11 Ex-Lap, TREVON, BSO, Pelvic and para-aortic LND, Omentectomy, Appendectomy, Frozen = Carcinoma, now returning with fevers, cough with pulmonary nodules present on CT chest.    #Abnormal CT chest  #Cough  #Fevers  CT chest from 5/30 with multiple sub 4mm B/L LL pulmonary nodules similar to CT from 5/14. No PE.  URI sx few days prior to surgery that resolved with amoxicillin, now with fevers + cough 1 day PTA  Lungs clear on auscultation today; no O2 requirement  No significant improvement in cough with saline nasal spray or H2 blocker  Unlikely pulmonary nodules are the cause of her persistent fevers given appearance and presence prior to her symptoms without significant change    Recommendations  - Defer bronchoscopy at this time  - Can trial cough suppression with benzonatate 100mg TID and flonase 2 sprays each nostril BID given some report of mild post nasal drip  - Abx per ID  - f/u lower ext duplex

## 2024-06-01 NOTE — PROGRESS NOTE ADULT - ASSESSMENT
30 yo POD#11 s/p  Ex-Lap, TREVON, BSO, Pelvic and para-aortic LND, Omentectomy, Appendectomy, Frozen = Carcinoma. Patient was discharged on POD 4 with improved pain control and afebrile. At home she was in her usual state of health until 5/29 and presented to the ED yesterday in the setting of fevers of unknown origin. Patient recently post-op with URI symptoms. Unclear whether fevers are from respiratory infection vs SSI. CTA neg for PE. CT Abd/Pelvis reveals post-surgical changes with possible seroma in R adnexa. UA neg and RVP neg thus far. Patient admitted for further evaluation and treatment with IV abx.  Patient afebrile overnight with downtrending WBC.     Neuro: PO acetaminophen and ibuprofen PRN  CV: Hemodynamically stable   Pulm: O2 sat WNL on RA. Continue encouraging IS use  - throat lozenges for cough support  - CTA with no PE  - CXR with clear lungs  GI: Tolerating regular diet  : Voiding spontaneously  Heme: apixaban 2.5mg BID; SCDs while in bed  ID: appreciate ID recommendations  - Cont Zosyn (5/30 -)  - Leukocytosis downtrending 17->12.51  - Procalcitonin 0.24, full viral panel neg   - f/u acute hepatitis panel, HIV, and MRSA swab  - F/u BCx and UCx (5/30)  - continue to monitor fever curve  - ID recommendations: continue Zosyn, consider US liver, consider US pelvis, consider LE duplex to r/o DVT, serum GGT to r/o gallbladder disease, f/u Bcx and adjust abx therapy accordingly, f/u pulmonary nodules  FEN: SLIV, Replete electrolytes PRN     Dispo: continue inpatient management    INCOMPLETE NOTE  RONI Stephenson PGY2   28 yo POD#11 s/p  Ex-Lap, TREVON, BSO, Pelvic and para-aortic LND, Omentectomy, Appendectomy, Frozen = Carcinoma. Patient was discharged on POD 4 with improved pain control and afebrile. At home she was in her usual state of health until 5/29 and presented to the ED yesterday in the setting of fevers of unknown origin. Patient recently post-op with URI symptoms. Unclear whether fevers are from respiratory infection vs SSI. CTA neg for PE. CT Abd/Pelvis reveals post-surgical changes with possible seroma in R adnexa. UA neg and RVP neg thus far. Patient admitted for further evaluation and treatment with IV abx.  Patient afebrile overnight with downtrending WBC.     Neuro: PO acetaminophen and ibuprofen PRN  CV: Hemodynamically stable   Pulm: O2 sat WNL on RA. Continue encouraging IS use  - throat lozenges for cough support  - CTA with no PE  - CXR with clear lungs  GI: Tolerating regular diet  : Voiding spontaneously  Heme: apixaban 2.5mg BID; SCDs while in bed  ID: appreciate ID recommendations  - Cont Zosyn (5/30 -)  - Leukocytosis downtrending 17->12.51  - If WBC continues to downtrend, consider transition to PO Augmentin  - Procalcitonin 0.24, full viral panel neg   - f/u acute hepatitis panel, HIV, and MRSA swab  - F/u BCx and UCx (5/30)  - continue to monitor fever curve  - ID recommendations: continue Zosyn, consider US liver, consider US pelvis, consider LE duplex to r/o DVT, serum GGT to r/o gallbladder disease, f/u Bcx and adjust abx therapy accordingly, f/u pulmonary nodules  FEN: SLIV, Replete electrolytes PRN     Dispo: continue inpatient management    RONI Stephenson PGY2

## 2024-06-01 NOTE — PROVIDER CONTACT NOTE (OTHER) - ASSESSMENT
L palm itchiness and discomfort when palpated, AOx4, VSS, afebrile, L palm itchiness and discomfort when palpated, AOx4, VSS, afebrile, blood- tinged urine

## 2024-06-01 NOTE — PROGRESS NOTE ADULT - SUBJECTIVE AND OBJECTIVE BOX
Follow Up:      Inverval History/ROS:Patient is a 29y old  Female who presents with a chief complaint of post op fevers (01 Jun 2024 04:50)    No fever  Still has cough, pleuritic discomfort  Cough is a little better with mucinex  Allergies    azithromycin (Hives)  Kiwi (Hives (Mild))    Intolerances        ANTIMICROBIALS:  piperacillin/tazobactam IVPB.. 3.375 every 8 hours      OTHER MEDS:  acetaminophen     Tablet .. 975 milliGRAM(s) Oral every 6 hours  apixaban 2.5 milliGRAM(s) Oral every 12 hours  benzocaine/menthol Lozenge 1 Lozenge Oral every 4 hours  famotidine    Tablet 20 milliGRAM(s) Oral daily  guaiFENesin  milliGRAM(s) Oral every 12 hours  ibuprofen  Tablet. 600 milliGRAM(s) Oral every 6 hours  simethicone 80 milliGRAM(s) Chew every 6 hours PRN  sodium chloride 0.65% Nasal 1 Spray(s) Both Nostrils every 3 hours      Vital Signs Last 24 Hrs  T(C): 36.6 (01 Jun 2024 10:02), Max: 37.5 (01 Jun 2024 02:12)  T(F): 97.8 (01 Jun 2024 10:02), Max: 99.5 (01 Jun 2024 02:12)  HR: 86 (01 Jun 2024 10:02) (82 - 92)  BP: 96/61 (01 Jun 2024 10:02) (96/61 - 113/57)  BP(mean): --  RR: 17 (01 Jun 2024 10:02) (16 - 18)  SpO2: 99% (01 Jun 2024 10:02) (98% - 100%)    Parameters below as of 01 Jun 2024 10:02  Patient On (Oxygen Delivery Method): room air        PHYSICAL EXAM:  General: [x ] non-toxic  HEAD/EYES: [ ] PERRL [x ] white sclera [ ] icterus  ENT:  [ ] normal [x ] supple [ ] thrush [ ] pharyngeal exudate  Cardiovascular:   [ ] murmur [x ] normal [ ] PPM/AICD  Respiratory:  [x ] clear to ausculation bilaterally  GI:  [ x] soft, non-tender, normal bowel sounds  :  [ ] atkins [ ] no CVA tenderness   Musculoskeletal:  [ ] no synovitis  Neurologic:  [ ] non-focal exam   Skin:  [x ] no rash  Lymph: [x ] no lymphadenopathy  Psychiatric:  [ ] appropriate affect [ ] alert & oriented  Lines:  [x ] no phlebitis [ ] central line                                10.6   10.59 )-----------( 381      ( 01 Jun 2024 05:46 )             31.6       06-01    138  |  102  |  7   ----------------------------<  96  3.9   |  19<L>  |  0.67    Ca    9.2      01 Jun 2024 05:46  Phos  2.6     05-31  Mg     2.10     05-31    TPro  7.2  /  Alb  3.6  /  TBili  0.7  /  DBili  x   /  AST  337<H>  /  ALT  285<H>  /  AlkPhos  257<H>  06-01      Urinalysis Basic - ( 01 Jun 2024 05:46 )    Color: x / Appearance: x / SG: x / pH: x  Gluc: 96 mg/dL / Ketone: x  / Bili: x / Urobili: x   Blood: x / Protein: x / Nitrite: x   Leuk Esterase: x / RBC: x / WBC x   Sq Epi: x / Non Sq Epi: x / Bacteria: x        MICROBIOLOGY:Culture Results:   10,000 - 49,000 CFU/mL Gram positive organisms (05-30-24 @ 19:43)  Culture Results:   No growth at 24 hours (05-30-24 @ 17:26)  Culture Results:   No growth at 24 hours (05-30-24 @ 17:15)      RADIOLOGY:

## 2024-06-01 NOTE — PROVIDER CONTACT NOTE (OTHER) - REASON
Pt. w/ vaginal pinkish discharge, Pad fully saturated and still going accompanied w/ abdl. discomfort/

## 2024-06-01 NOTE — PROGRESS NOTE ADULT - SUBJECTIVE AND OBJECTIVE BOX
Gyn ONC Progress Note POD#11 HD#3    INCOMPLETE NOTE    Subjective:   Pt seen and examined at bedside. No events overnight. Pain well controlled. Patient afebrile overnight.  Patient ambulating. Passing flatus. Tolerating regular diet. Pt denies fever, chills, chest pain, SOB, nausea, vomiting, lightheadedness, dizziness.      Objective:  T(F): 99.5 (06-01-24 @ 02:12), Max: 99.5 (06-01-24 @ 02:12)  HR: 82 (06-01-24 @ 02:12) (82 - 101)  BP: 100/57 (06-01-24 @ 02:12) (99/65 - 109/75)  RR: 16 (06-01-24 @ 02:12) (16 - 18)  SpO2: 100% (06-01-24 @ 02:12) (96% - 100%)  Wt(kg): --  I&O's Summary    30 May 2024 07:01  -  31 May 2024 07:00  --------------------------------------------------------  IN: 340 mL / OUT: 600 mL / NET: -260 mL    31 May 2024 07:01  -  01 Jun 2024 04:51  --------------------------------------------------------  IN: 1540 mL / OUT: 1350 mL / NET: 190 mL      CAPILLARY BLOOD GLUCOSE          MEDICATIONS  (STANDING):  acetaminophen     Tablet .. 975 milliGRAM(s) Oral every 6 hours  apixaban 2.5 milliGRAM(s) Oral every 12 hours  benzocaine/menthol Lozenge 1 Lozenge Oral every 4 hours  famotidine    Tablet 20 milliGRAM(s) Oral daily  guaiFENesin  milliGRAM(s) Oral every 12 hours  ibuprofen  Tablet. 600 milliGRAM(s) Oral every 6 hours  piperacillin/tazobactam IVPB.. 3.375 Gram(s) IV Intermittent every 8 hours    MEDICATIONS  (PRN):  simethicone 80 milliGRAM(s) Chew every 6 hours PRN Gas      Physical Exam:  General: NAD  CV: RRR  Lungs: CTA b/l  Abdomen: Soft, non-tender diffusely, non distended, +bowel sounds, abdominal binder in place  Incision: Midline vertical incision, CDI, dermabond prineo in place   Ext: No pain or edema, SCD's in place    LABS:            10.4      12.51 )------------( 337        ( 05-31-24 @ 05:31 )            31.5    05-31    136    |  103    |  6<L>   ----------------------------<  106<H>  3.8     |  22     |  0.70   05-30    136    |  99     |  9      ----------------------------<  102<H>  3.8     |  22     |  0.77     Ca    8.8        31 May 2024 05:31  Ca    9.5        30 May 2024 17:46  Phos  2.6       05-31  Mg     2.10      05-31    TPro  x      /  Alb  x      /  TBili  x      /  DBili  x      /  AST  124<H>  /  ALT  132<H>  /  AlkPhos  x      05-31  TPro  7.7    /  Alb  4.2    /  TBili  0.6    /  DBili  x      /  AST  181<H>  /  ALT  163<H>  /  AlkPhos  173<H>  05-30        PT/INR - ( 30 May 2024 17:46 )   PT: 14.4 sec;   INR: 1.28 ratio         PTT - ( 30 May 2024 17:46 )  PTT:36.6 sec  Urinalysis Basic - ( 31 May 2024 05:31 )    Color: x / Appearance: x / SG: x / pH: x  Gluc: 106 mg/dL / Ketone: x  / Bili: x / Urobili: x   Blood: x / Protein: x / Nitrite: x   Leuk Esterase: x / RBC: x / WBC x   Sq Epi: x / Non Sq Epi: x / Bacteria: x         Gyn ONC Progress Note POD#11 HD#3    Subjective:   Pt seen and examined at bedside. No events overnight. Continues to endorse cough/congestion, although reports these symptoms are improving with lozenges and Mucinex.  Endorses some gas pain, otherwise pain well controlled. Patient afebrile overnight.  Patient ambulating. Passing flatus. Tolerating regular diet. Reports some vaginal discharge with abdominal cramping yesterday that has since resolved overnight.  Pt denies fever, chills, chest pain, SOB, nausea, vomiting, lightheadedness, dizziness.      Objective:  T(F): 99.5 (06-01-24 @ 02:12), Max: 99.5 (06-01-24 @ 02:12)  HR: 82 (06-01-24 @ 02:12) (82 - 101)  BP: 100/57 (06-01-24 @ 02:12) (99/65 - 109/75)  RR: 16 (06-01-24 @ 02:12) (16 - 18)  SpO2: 100% (06-01-24 @ 02:12) (96% - 100%)  Wt(kg): --  I&O's Summary    30 May 2024 07:01  -  31 May 2024 07:00  --------------------------------------------------------  IN: 340 mL / OUT: 600 mL / NET: -260 mL    31 May 2024 07:01  -  01 Jun 2024 04:51  --------------------------------------------------------  IN: 1540 mL / OUT: 1350 mL / NET: 190 mL      CAPILLARY BLOOD GLUCOSE          MEDICATIONS  (STANDING):  acetaminophen     Tablet .. 975 milliGRAM(s) Oral every 6 hours  apixaban 2.5 milliGRAM(s) Oral every 12 hours  benzocaine/menthol Lozenge 1 Lozenge Oral every 4 hours  famotidine    Tablet 20 milliGRAM(s) Oral daily  guaiFENesin  milliGRAM(s) Oral every 12 hours  ibuprofen  Tablet. 600 milliGRAM(s) Oral every 6 hours  piperacillin/tazobactam IVPB.. 3.375 Gram(s) IV Intermittent every 8 hours    MEDICATIONS  (PRN):  simethicone 80 milliGRAM(s) Chew every 6 hours PRN Gas      Physical Exam:  General: NAD  CV: RRR  Lungs: CTA b/l  Abdomen: Soft, non-tender diffusely, non distended, +bowel sounds  Incision: Midline vertical incision, CDI, dermabond prineo in place   : no bleeding on pad  Ext: No pain or edema    LABS:            10.4      12.51 )------------( 337        ( 05-31-24 @ 05:31 )            31.5    05-31    136    |  103    |  6<L>   ----------------------------<  106<H>  3.8     |  22     |  0.70   05-30    136    |  99     |  9      ----------------------------<  102<H>  3.8     |  22     |  0.77     Ca    8.8        31 May 2024 05:31  Ca    9.5        30 May 2024 17:46  Phos  2.6       05-31  Mg     2.10      05-31    TPro  x      /  Alb  x      /  TBili  x      /  DBili  x      /  AST  124<H>  /  ALT  132<H>  /  AlkPhos  x      05-31  TPro  7.7    /  Alb  4.2    /  TBili  0.6    /  DBili  x      /  AST  181<H>  /  ALT  163<H>  /  AlkPhos  173<H>  05-30        PT/INR - ( 30 May 2024 17:46 )   PT: 14.4 sec;   INR: 1.28 ratio         PTT - ( 30 May 2024 17:46 )  PTT:36.6 sec  Urinalysis Basic - ( 31 May 2024 05:31 )    Color: x / Appearance: x / SG: x / pH: x  Gluc: 106 mg/dL / Ketone: x  / Bili: x / Urobili: x   Blood: x / Protein: x / Nitrite: x   Leuk Esterase: x / RBC: x / WBC x   Sq Epi: x / Non Sq Epi: x / Bacteria: x

## 2024-06-01 NOTE — PROVIDER CONTACT NOTE (OTHER) - SITUATION
L palm itchiness and discomfort when palpated L palm itchiness and discomfort when palpated, blood tinged urine.

## 2024-06-01 NOTE — PROVIDER CONTACT NOTE (OTHER) - ASSESSMENT
AOx4, (+) warm to touch, flushed skin, CZ=256.7. ZR=712 denies pain AOx4, (+) warm to touch, flushed skin, EE=283.7. UT=590 denies pain, no swelling noted on B/L LE, (+) pp

## 2024-06-01 NOTE — CONSULT NOTE ADULT - SUBJECTIVE AND OBJECTIVE BOX
CHIEF COMPLAINT:Patient is a 29y old  Female who presents with a chief complaint of post op fevers (01 Jun 2024 10:27)      HPI:  28 yo POD 9 Ex-Lap, TREVON, BSO, Pelvic and para-aortic LND, Omentectomy, Appendectomy, Frozen = Carcinoma. Patient was discharged on POD 4 with improved pain control and afebrile. At home she was in her usual state of health until yesterday. She spiked a low grade temp at 4p yesterday at 4p of 100.7. She took some Tylenol and fever resolved. She then had a 101.5 at 9m on 5/29. She called the answering service which advised her to come to ED. She did not come overnight because she felt weak and tired. She took Tylenol and Motrin overnight. Today around 6a she had another low grade temp to 100.4 and at 145p today had a temperature of 101.2.   She endorses URI symptoms, including cough, and fevers before the surgery, prompting evaluation by PCP, who prescribed her amoxicillin. She reports finishing the course of abx before the surgery. Denies any sick contacts and is not having any notable GI/ symptoms. States loose stools but no diarrhea and denies any abnormal vaginal bleeding or discharge. Currently denies productive cough. Denies runny nose.       GYN Onc Hx  -Presented to the ER at [facility] on 5/6 with 3 days of RLQ pain, nausea, and decreased appetite. Workup included CT A/P which revealed abnormally enlarged heterogeneous solid and cystic  appearance of the left ovary/fallopian tube measuring 13.6 x 8.1 x 11.4cm highly suspicious for carcinoma.  -CA-125 is elevated at 810, ,  33, CEA 0.8  -5/2024 CT A/P - bilateral ovarian/adnexal solid and cystic masses, small volume carcinomatosis, trace ascites, trace pleural effusions, indeterminant hepatic lesions,  -5/21:  Ex-Lap, TREVON, BSO, Pelvic and para-aortic LND, Omentectomy, Appendectomy, Frozen = Carcinoma.       OBHx: Primigravid    Pulmonary consulted for fevers, cough which prompted CT scan which revealed sub 4mm nodules in lower lobes, similar in appearance to scan performed 5/14. She describes the cough with associated chest discomfort but denies pain, reporting more of a sensation of feeling fatigued following the cough. She notes some post nasal drip but relatively minor. No nasal congestion. Some sore throat after using nasal spray per her report.    PAST MEDICAL HISTORY:  H/O eczema , H/O pelvic mass , H/O upper respiratory infection H/O keloid of skin     Allergies:   	azithromycin: Drug, Hives  	Kiwi: Food, Hives (Mild)    Home Medications:   * Patient Currently Takes Medications as of 25-May-2024 17:35 documented in Structured Notes  · 	benzonatate 200 mg oral capsule: 1 cap(s) orally every 8 hours as needed for  cough  · 	oxyCODONE 5 mg oral tablet: 1 tab(s) orally every 6 hours as needed for Severe Pain (7 - 10) MDD: 4 tabs per day  · 	ibuprofen 600 mg oral tablet: 1 tab(s) orally every 6 hours  · 	acetaminophen 325 mg oral tablet: 3 tab(s) orally every 6 hours  · 	apixaban 2.5 mg oral tablet: 1 tab(s) orally 2 times a day  · 	benzonatate 200 mg oral capsule: 1 cap(s) orally 3 times a day (30 May 2024 22:46)      PAST MEDICAL & SURGICAL HISTORY:  H/O pelvic mass      Ovarian cystic mass      Urinary tract infection      H/O upper respiratory infection      H/O eczema      History of keloid of skin      No significant past surgical history          FAMILY HISTORY:      SOCIAL HISTORY:  Smoking: no    Allergies    azithromycin (Hives)  Kiwi (Hives (Mild))    Intolerances        HOME MEDICATIONS:  Home Medications:  acetaminophen 325 mg oral tablet: 3 tab(s) orally every 6 hours (25 May 2024 16:40)  benzonatate 200 mg oral capsule: 1 cap(s) orally 3 times a day (21 May 2024 14:05)  ibuprofen 600 mg oral tablet: 1 tab(s) orally every 6 hours (25 May 2024 16:40)      REVIEW OF SYSTEMS:  Constitutional: [ ] negative [x ] fevers [ ] chills [ ] weight loss [ ] weight gain  HEENT: [ ] negative [ ] dry eyes [ ] eye irritation [ x] postnasal drip, mild [ ] nasal congestion  CV: [ x] negative  [ ] chest pain [ ] orthopnea [ ] palpitations [ ] murmur  Resp: [ ] negative [x ] cough [ ] shortness of breath [ ] dyspnea [ ] wheezing [ ] sputum [ ] hemoptysis  GI: [ ] negative [ ] nausea [ ] vomiting [ ] diarrhea [ ] constipation [ ] abd pain [ ] dysphagia   : [ ] negative [ ] dysuria [ ] nocturia [ ] hematuria [ ] increased urinary frequency  Musculoskeletal: [ ] negative [ ] back pain [ ] myalgias [ ] arthralgias [ ] fracture  Skin: [ ] negative [ ] rash [ ] itch  Neurological: [ ] negative [ ] headache [ ] dizziness [ ] syncope [ ] weakness [ ] numbness  Psychiatric: [ ] negative [ ] anxiety [ ] depression  Endocrine: [ ] negative [ ] diabetes [ ] thyroid problem  Hematologic/Lymphatic: [ ] negative [ ] anemia [ ] bleeding problem  Allergic/Immunologic: [ ] negative [ ] itchy eyes [ ] nasal discharge [ ] hives [ ] angioedema  [x ] All other systems negative  [ ] Unable to assess ROS because ________    OBJECTIVE:  ICU Vital Signs Last 24 Hrs  T(C): 37.1 (01 Jun 2024 18:14), Max: 38.2 (01 Jun 2024 17:43)  T(F): 98.7 (01 Jun 2024 18:14), Max: 100.7 (01 Jun 2024 17:43)  HR: 100 (01 Jun 2024 17:43) (82 - 100)  BP: 104/68 (01 Jun 2024 17:43) (96/61 - 115/67)  BP(mean): --  ABP: --  ABP(mean): --  RR: 16 (01 Jun 2024 17:43) (16 - 17)  SpO2: 100% (01 Jun 2024 17:43) (98% - 100%)    O2 Parameters below as of 01 Jun 2024 17:43  Patient On (Oxygen Delivery Method): room air              05-31 @ 07:01  -  06-01 @ 07:00  --------------------------------------------------------  IN: 1660 mL / OUT: 1350 mL / NET: 310 mL    06-01 @ 07:01 - 06-01 @ 19:00  --------------------------------------------------------  IN: 1350 mL / OUT: 1800 mL / NET: -450 mL      CAPILLARY BLOOD GLUCOSE          PHYSICAL EXAM:  Gen: NAD, WD, WN  HEENT: MMM, PERRL, EOMI  Neck: No JVP elevation  CV: RRR, no m/r/g  Lung: CTAB  Ext: WWP, no CCE  Skin: No rash  Neuro: A&Ox3, no focal deficits    HOSPITAL MEDICATIONS:  Standing Meds:  acetaminophen     Tablet .. 975 milliGRAM(s) Oral every 6 hours  apixaban 2.5 milliGRAM(s) Oral every 12 hours  benzocaine/menthol Lozenge 1 Lozenge Oral every 4 hours  famotidine    Tablet 20 milliGRAM(s) Oral daily  guaiFENesin  milliGRAM(s) Oral every 12 hours  ibuprofen  Tablet. 600 milliGRAM(s) Oral every 6 hours  piperacillin/tazobactam IVPB.. 3.375 Gram(s) IV Intermittent every 8 hours  sodium chloride 0.65% Nasal 1 Spray(s) Both Nostrils every 3 hours      PRN Meds:  simethicone 80 milliGRAM(s) Chew every 6 hours PRN      LABS:    The Labs were reviewed by me   The Radiology was reviewed by me    EKG tracing reviewed by me    06-01    138  |  102  |  7   ----------------------------<  96  3.9   |  19<L>  |  0.67  05-31    136  |  103  |  6<L>  ----------------------------<  106<H>  3.8   |  22  |  0.70  05-30    136  |  99  |  9   ----------------------------<  102<H>  3.8   |  22  |  0.77    Ca    9.2      01 Jun 2024 05:46  Ca    8.8      31 May 2024 05:31  Ca    9.5      30 May 2024 17:46  Phos  2.6     05-31  Mg     2.10     05-31    TPro  7.2  /  Alb  3.6  /  TBili  0.7  /  DBili  x   /  AST  337<H>  /  ALT  285<H>  /  AlkPhos  257<H>  06-01  TPro  x   /  Alb  x   /  TBili  x   /  DBili  x   /  AST  124<H>  /  ALT  132<H>  /  AlkPhos  x   05-31  TPro  7.7  /  Alb  4.2  /  TBili  0.6  /  DBili  x   /  AST  181<H>  /  ALT  163<H>  /  AlkPhos  173<H>  05-30    Magnesium: 2.10 mg/dL (05-31-24 @ 05:31)    Phosphorus: 2.6 mg/dL (05-31-24 @ 05:31)                    Urinalysis Basic - ( 01 Jun 2024 05:46 )    Color: x / Appearance: x / SG: x / pH: x  Gluc: 96 mg/dL / Ketone: x  / Bili: x / Urobili: x   Blood: x / Protein: x / Nitrite: x   Leuk Esterase: x / RBC: x / WBC x   Sq Epi: x / Non Sq Epi: x / Bacteria: x                              10.6   10.59 )-----------( 381      ( 01 Jun 2024 05:46 )             31.6                         10.4   12.51 )-----------( 337      ( 31 May 2024 05:31 )             31.5                         12.0   17.54 )-----------( 416      ( 30 May 2024 17:46 )             36.5     CAPILLARY BLOOD GLUCOSE            MICROBIOLOGY:     Culture - Urine (collected 30 May 2024 19:43)  Source: Clean Catch Clean Catch (Midstream)  Preliminary Report (01 Jun 2024 10:21):    10,000 - 49,000 CFU/mL Gram positive organisms    Culture - Blood (collected 30 May 2024 17:26)  Source: .Blood Blood-Peripheral  Preliminary Report (31 May 2024 22:02):    No growth at 24 hours    Culture - Blood (collected 30 May 2024 17:15)  Source: .Blood Blood-Peripheral  Preliminary Report (31 May 2024 22:02):    No growth at 24 hours        RADIOLOGY:  [ ] Reviewed and interpreted by me    PULMONARY FUNCTION TESTS:    EKG:

## 2024-06-02 LAB
ALBUMIN SERPL ELPH-MCNC: 3.7 G/DL — SIGNIFICANT CHANGE UP (ref 3.3–5)
ALP SERPL-CCNC: 312 U/L — HIGH (ref 40–120)
ALT FLD-CCNC: 471 U/L — HIGH (ref 4–33)
ANION GAP SERPL CALC-SCNC: 14 MMOL/L — SIGNIFICANT CHANGE UP (ref 7–14)
AST SERPL-CCNC: 442 U/L — HIGH (ref 4–32)
BASOPHILS # BLD AUTO: 0.04 K/UL — SIGNIFICANT CHANGE UP (ref 0–0.2)
BASOPHILS # BLD AUTO: 0.07 K/UL — SIGNIFICANT CHANGE UP (ref 0–0.2)
BASOPHILS NFR BLD AUTO: 0.5 % — SIGNIFICANT CHANGE UP (ref 0–2)
BASOPHILS NFR BLD AUTO: 0.8 % — SIGNIFICANT CHANGE UP (ref 0–2)
BILIRUB SERPL-MCNC: 0.8 MG/DL — SIGNIFICANT CHANGE UP (ref 0.2–1.2)
BUN SERPL-MCNC: 10 MG/DL — SIGNIFICANT CHANGE UP (ref 7–23)
CALCIUM SERPL-MCNC: 9.1 MG/DL — SIGNIFICANT CHANGE UP (ref 8.4–10.5)
CHLORIDE SERPL-SCNC: 104 MMOL/L — SIGNIFICANT CHANGE UP (ref 98–107)
CO2 SERPL-SCNC: 21 MMOL/L — LOW (ref 22–31)
CREAT SERPL-MCNC: 0.75 MG/DL — SIGNIFICANT CHANGE UP (ref 0.5–1.3)
CULTURE RESULTS: SIGNIFICANT CHANGE UP
EGFR: 109 ML/MIN/1.73M2 — SIGNIFICANT CHANGE UP
EOSINOPHIL # BLD AUTO: 0.13 K/UL — SIGNIFICANT CHANGE UP (ref 0–0.5)
EOSINOPHIL # BLD AUTO: 0.24 K/UL — SIGNIFICANT CHANGE UP (ref 0–0.5)
EOSINOPHIL NFR BLD AUTO: 1.5 % — SIGNIFICANT CHANGE UP (ref 0–6)
EOSINOPHIL NFR BLD AUTO: 2.6 % — SIGNIFICANT CHANGE UP (ref 0–6)
GLUCOSE SERPL-MCNC: 94 MG/DL — SIGNIFICANT CHANGE UP (ref 70–99)
HCT VFR BLD CALC: 32.5 % — LOW (ref 34.5–45)
HCT VFR BLD CALC: 35 % — SIGNIFICANT CHANGE UP (ref 34.5–45)
HGB BLD-MCNC: 11 G/DL — LOW (ref 11.5–15.5)
HGB BLD-MCNC: 11.3 G/DL — LOW (ref 11.5–15.5)
IANC: 6.61 K/UL — SIGNIFICANT CHANGE UP (ref 1.8–7.4)
IANC: 6.95 K/UL — SIGNIFICANT CHANGE UP (ref 1.8–7.4)
IMM GRANULOCYTES NFR BLD AUTO: 0.8 % — SIGNIFICANT CHANGE UP (ref 0–0.9)
IMM GRANULOCYTES NFR BLD AUTO: 1.1 % — HIGH (ref 0–0.9)
LYMPHOCYTES # BLD AUTO: 1.19 K/UL — SIGNIFICANT CHANGE UP (ref 1–3.3)
LYMPHOCYTES # BLD AUTO: 1.33 K/UL — SIGNIFICANT CHANGE UP (ref 1–3.3)
LYMPHOCYTES # BLD AUTO: 12.8 % — LOW (ref 13–44)
LYMPHOCYTES # BLD AUTO: 15.3 % — SIGNIFICANT CHANGE UP (ref 13–44)
MAGNESIUM SERPL-MCNC: 2.3 MG/DL — SIGNIFICANT CHANGE UP (ref 1.6–2.6)
MCHC RBC-ENTMCNC: 27.4 PG — SIGNIFICANT CHANGE UP (ref 27–34)
MCHC RBC-ENTMCNC: 28.4 PG — SIGNIFICANT CHANGE UP (ref 27–34)
MCHC RBC-ENTMCNC: 32.3 GM/DL — SIGNIFICANT CHANGE UP (ref 32–36)
MCHC RBC-ENTMCNC: 33.8 GM/DL — SIGNIFICANT CHANGE UP (ref 32–36)
MCV RBC AUTO: 84 FL — SIGNIFICANT CHANGE UP (ref 80–100)
MCV RBC AUTO: 85 FL — SIGNIFICANT CHANGE UP (ref 80–100)
MONOCYTES # BLD AUTO: 0.53 K/UL — SIGNIFICANT CHANGE UP (ref 0–0.9)
MONOCYTES # BLD AUTO: 0.72 K/UL — SIGNIFICANT CHANGE UP (ref 0–0.9)
MONOCYTES NFR BLD AUTO: 6.1 % — SIGNIFICANT CHANGE UP (ref 2–14)
MONOCYTES NFR BLD AUTO: 7.8 % — SIGNIFICANT CHANGE UP (ref 2–14)
NEUTROPHILS # BLD AUTO: 6.61 K/UL — SIGNIFICANT CHANGE UP (ref 1.8–7.4)
NEUTROPHILS # BLD AUTO: 6.95 K/UL — SIGNIFICANT CHANGE UP (ref 1.8–7.4)
NEUTROPHILS NFR BLD AUTO: 74.9 % — SIGNIFICANT CHANGE UP (ref 43–77)
NEUTROPHILS NFR BLD AUTO: 75.8 % — SIGNIFICANT CHANGE UP (ref 43–77)
NRBC # BLD: 0 /100 WBCS — SIGNIFICANT CHANGE UP (ref 0–0)
NRBC # BLD: 0 /100 WBCS — SIGNIFICANT CHANGE UP (ref 0–0)
NRBC # FLD: 0 K/UL — SIGNIFICANT CHANGE UP (ref 0–0)
NRBC # FLD: 0 K/UL — SIGNIFICANT CHANGE UP (ref 0–0)
PHOSPHATE SERPL-MCNC: 3.4 MG/DL — SIGNIFICANT CHANGE UP (ref 2.5–4.5)
PLATELET # BLD AUTO: 403 K/UL — HIGH (ref 150–400)
PLATELET # BLD AUTO: 448 K/UL — HIGH (ref 150–400)
POTASSIUM SERPL-MCNC: 4 MMOL/L — SIGNIFICANT CHANGE UP (ref 3.5–5.3)
POTASSIUM SERPL-SCNC: 4 MMOL/L — SIGNIFICANT CHANGE UP (ref 3.5–5.3)
PROT SERPL-MCNC: 7.2 G/DL — SIGNIFICANT CHANGE UP (ref 6–8.3)
RBC # BLD: 3.87 M/UL — SIGNIFICANT CHANGE UP (ref 3.8–5.2)
RBC # BLD: 4.12 M/UL — SIGNIFICANT CHANGE UP (ref 3.8–5.2)
RBC # FLD: 13.2 % — SIGNIFICANT CHANGE UP (ref 10.3–14.5)
RBC # FLD: 13.2 % — SIGNIFICANT CHANGE UP (ref 10.3–14.5)
SODIUM SERPL-SCNC: 139 MMOL/L — SIGNIFICANT CHANGE UP (ref 135–145)
SPECIMEN SOURCE: SIGNIFICANT CHANGE UP
WBC # BLD: 8.71 K/UL — SIGNIFICANT CHANGE UP (ref 3.8–10.5)
WBC # BLD: 9.27 K/UL — SIGNIFICANT CHANGE UP (ref 3.8–10.5)
WBC # FLD AUTO: 8.71 K/UL — SIGNIFICANT CHANGE UP (ref 3.8–10.5)
WBC # FLD AUTO: 9.27 K/UL — SIGNIFICANT CHANGE UP (ref 3.8–10.5)

## 2024-06-02 PROCEDURE — 74177 CT ABD & PELVIS W/CONTRAST: CPT | Mod: 26

## 2024-06-02 RX ORDER — FLUTICASONE PROPIONATE 50 MCG
2 SPRAY, SUSPENSION NASAL
Refills: 0 | Status: DISCONTINUED | OUTPATIENT
Start: 2024-06-02 | End: 2024-06-11

## 2024-06-02 RX ADMIN — APIXABAN 2.5 MILLIGRAM(S): 2.5 TABLET, FILM COATED ORAL at 05:43

## 2024-06-02 RX ADMIN — Medication 100 MILLIGRAM(S): at 05:43

## 2024-06-02 RX ADMIN — BENZOCAINE AND MENTHOL 1 LOZENGE: 5; 1 LIQUID ORAL at 09:55

## 2024-06-02 RX ADMIN — Medication 600 MILLIGRAM(S): at 17:07

## 2024-06-02 RX ADMIN — Medication 600 MILLIGRAM(S): at 06:43

## 2024-06-02 RX ADMIN — Medication 600 MILLIGRAM(S): at 05:43

## 2024-06-02 RX ADMIN — PIPERACILLIN AND TAZOBACTAM 25 GRAM(S): 4; .5 INJECTION, POWDER, LYOPHILIZED, FOR SOLUTION INTRAVENOUS at 09:55

## 2024-06-02 RX ADMIN — Medication 600 MILLIGRAM(S): at 19:10

## 2024-06-02 RX ADMIN — BENZOCAINE AND MENTHOL 1 LOZENGE: 5; 1 LIQUID ORAL at 05:43

## 2024-06-02 RX ADMIN — Medication 100 MILLIGRAM(S): at 15:58

## 2024-06-02 RX ADMIN — Medication 600 MILLIGRAM(S): at 17:06

## 2024-06-02 RX ADMIN — Medication 100 MILLIGRAM(S): at 21:13

## 2024-06-02 RX ADMIN — PIPERACILLIN AND TAZOBACTAM 25 GRAM(S): 4; .5 INJECTION, POWDER, LYOPHILIZED, FOR SOLUTION INTRAVENOUS at 17:06

## 2024-06-02 RX ADMIN — APIXABAN 2.5 MILLIGRAM(S): 2.5 TABLET, FILM COATED ORAL at 17:06

## 2024-06-02 RX ADMIN — BENZOCAINE AND MENTHOL 1 LOZENGE: 5; 1 LIQUID ORAL at 15:57

## 2024-06-02 RX ADMIN — Medication 2 SPRAY(S): at 17:08

## 2024-06-02 RX ADMIN — BENZOCAINE AND MENTHOL 1 LOZENGE: 5; 1 LIQUID ORAL at 21:13

## 2024-06-02 RX ADMIN — PIPERACILLIN AND TAZOBACTAM 25 GRAM(S): 4; .5 INJECTION, POWDER, LYOPHILIZED, FOR SOLUTION INTRAVENOUS at 01:28

## 2024-06-02 RX ADMIN — Medication 2 SPRAY(S): at 05:42

## 2024-06-02 NOTE — PROGRESS NOTE ADULT - ASSESSMENT
30 yo POD#12 s/p  Ex-Lap, TREVON, BSO, Pelvic and para-aortic LND, Omentectomy, Appendectomy, Frozen = Carcinoma. Patient was discharged on POD 4 with improved pain control and afebrile. At home she was in her usual state of health until 5/29 and presented to the ED on 5/30 in the setting of fevers of unknown origin. Patient recently post-op with URI symptoms. Unclear whether fevers are from respiratory infection vs SSI. CTA neg for PE, but showing small pulmonary nodules. CT Abd/Pelvis reveals post-surgical changes with possible seroma in R adnexa. UA neg and RVP neg.  UCx now positive for E. Fecalis.  Patient admitted for further evaluation and treatment with IV abx.  Patient with recent fever to 38.2 on 6/1 in the afternoon, however has remained afebrile overnight.      Neuro: PO acetaminophen and ibuprofen PRN  CV: Hemodynamically stable   Pulm: O2 sat WNL on RA. Continue encouraging IS use  - throat lozenges for cough support  - CTA with no PE, but with multiple bilateral lower lobe sub-4 mm nodules, without significant interval change compared with the immediate prior CT chest 5/14/2024.  - CXR with clear lungs  - Pulm consulted, stating unlikely fevers 2/2 to pulmonary nodules, would defer broncoscopy at this time, recommended trial cough suppression with benzonatate 100mg TID and flonase 2 sprays each nostril BID  GI: Tolerating regular diet  : Voiding spontaneously  Heme: apixaban 2.5mg BID; SCDs while in bed  ID: appreciate ID recommendations  - Cont Zosyn (5/30 -)  - Yesterday (6/1) attempt made to transition patient to PO abx, however given recurrent fever will continue on Zosyn for now  - Leukocytosis downtrending 17->12.51->10.59  - Procalcitonin 0.24, full viral panel neg   - f/u acute hepatitis panel, HIV, and MRSA swab  - F/u BCx  - UCx (5/30): E. Fecalis  - HIV(6/1): neg  - continue to monitor fever curve  - GGT (6/1): 266  - RUQ sono (6/1): Centimeter sized central RIGHT hyperechoic lesion, Contracted gallbladder without stones. Possible millimeter size gallbladder mural polyp.  Likely noncontributory to fevers  - f/u LLE dopplers (6/1) to r/o DVT in setting of fevers  - ID recommendations: continue Zosyn, consider US liver, consider US pelvis, consider LE duplex to r/o DVT, serum GGT to r/o gallbladder disease, f/u Bcx and adjust abx therapy accordingly, f/u pulmonary nodules  FEN: SLIV, Replete electrolytes PRN     Dispo: continue inpatient management    INCOMPLETE NOTE  RONI Stephenson PGY2   30 yo POD#12 s/p  Ex-Lap, TREVON, BSO, Pelvic and para-aortic LND, Omentectomy, Appendectomy, Frozen = Carcinoma. Patient was discharged on POD 4 with improved pain control and afebrile. At home she was in her usual state of health until 5/29 and presented to the ED on 5/30 in the setting of fevers of unknown origin. Patient recently post-op with URI symptoms. Unclear whether fevers are from respiratory infection vs SSI. CTA neg for PE, but showing small pulmonary nodules. CT Abd/Pelvis reveals post-surgical changes with possible seroma in R adnexa. UA neg and RVP neg.  UCx now positive for E. Fecalis.  Patient admitted for further evaluation and treatment with IV abx.  Patient with recent fever to 38.2 on 6/1 in the afternoon, however has remained afebrile overnight.    Neuro: PO acetaminophen and ibuprofen PRN  CV: Hemodynamically stable   Pulm: O2 sat WNL on RA. Continue encouraging IS use  - throat lozenges for cough support  - CTA with no PE, but with multiple bilateral lower lobe sub-4 mm nodules, without significant interval change compared with the immediate prior CT chest 5/14/2024.  - CXR with clear lungs  - Pulm consulted, stating unlikely fevers 2/2 to pulmonary nodules, would defer broncoscopy at this time, recommended trial cough suppression with benzonatate 100mg TID and flonase 2 sprays each nostril BID  GI: Tolerating regular diet  : Voiding spontaneously  - Patient with clear/yellow vaginal discharge, evaluated overnight  - Consider possible fluid analysis with Cr vs double dye test today to evaluate for fistula vs draining seroma  Heme: apixaban 2.5mg BID; SCDs while in bed  ID: appreciate ID recommendations  - Cont Zosyn (5/30 -)  - Yesterday (6/1) attempt made to transition patient to PO abx, however given recurrent fever will continue on Zosyn for now  - Leukocytosis downtrending 17->12.51->10.59  - Procalcitonin 0.24, full viral panel neg   - f/u acute hepatitis panel, HIV, and MRSA swab  - F/u BCx  - UCx (5/30): E. Fecalis  - HIV(6/1): neg  - continue to monitor fever curve  - GGT (6/1): 266  - RUQ sono (6/1): Centimeter sized central RIGHT hyperechoic lesion, Contracted gallbladder without stones. Possible millimeter size gallbladder mural polyp.  Likely noncontributory to fevers  - f/u LLE dopplers (6/1) to r/o DVT in setting of fevers  - ID recommendations: continue Zosyn, consider US liver, consider US pelvis, consider LE duplex to r/o DVT, serum GGT to r/o gallbladder disease, f/u Bcx and adjust abx therapy accordingly, f/u pulmonary nodules  FEN: SLIV, Replete electrolytes PRN     Dispo: continue inpatient management    RONI Stephenson PGY2

## 2024-06-02 NOTE — CHART NOTE - NSCHARTNOTEFT_GEN_A_CORE
Patient seen and evaluated at bedside with patient's mother present.  Continues to endorse copious serous/pink tinged vaginal discharge, now increased from AM.  Discussed rationale for tampon dye test to evaluate for possible bladder leak.  All questions answered.  Patient provided verbal consent for test.  Salter catheter placed into bladder.  Speculum exam performed, vaginal cuff appeared visually intact without suture material visualized, probed lightly with q-tip without defect, pink-tinged serous discharge noted in vagina that reaccumulated after clearing.  Tampon placed in vagina.  Salter clamped and bladder backfilled with approx 225cc Methylene blue dye.  Patient to ambulate for approx 30 minutes with tampon in place and will drain bladder and remove tampon to evaluate for leakage.    Seen and evaluated with SAMSON Short PGY4  Plan per Dr. Lew  d/w Dr. Elder, Gyn Onc Fellow Patient seen and evaluated at bedside with patient's mother present.  Continues to endorse copious serous/pink tinged vaginal discharge, now increased from AM.  Discussed rationale for tampon dye test to evaluate for possible bladder leak.  All questions answered.  Patient provided verbal consent for test.  Salter catheter placed into bladder.  Speculum exam performed, vaginal cuff appeared visually intact without suture material visualized, probed lightly with q-tip without defect, pink-tinged serous discharge noted in vagina that reaccumulated after clearing.  Tampon placed in vagina.  Salter clamped and bladder backfilled with approx 225cc Methylene blue dye.  Patient to ambulate for approx 30 minutes with tampon in place and will drain bladder and remove tampon to evaluate for leakage.    Seen and evaluated with SAMSON Short PGY4  Plan per Dr. Lew  d/w Dr. Elder, Gyn Onc Fellow      Update:  Patient was reevaluated 30 minutes after dye instilled and had been ambulating in halls.  Bladder with dye drained.  Tampon removed and examined, no dye leakage onto tampon.  No evidence of bladder leakage as source of serous discharge.  Salter catheter removed.      d/w Dr. Elder Gyn Onc Fellow and Dr. Vipin Stephenson PGY2

## 2024-06-02 NOTE — PROGRESS NOTE ADULT - ATTENDING COMMENTS
patient continues to have a fever  also had leakeage via the vagina - ?drainage of small pelvic collection vs. bladder leak - will do backfill methylene blue test  discussed plan  possible need for reimaging, will re-eval based on tampon test. patient continues to have a fever  also had leakeage via the vagina - ?drainage of small pelvic collection vs. bladder leak - will do backfill methylene blue test  discussed plan  possible need for reimaging, will re-eval based on tampon test.    pelvic exam repeated after negative tampon test  noted small vag cuff separation top of the vagina , intact on digital exam, small drainage

## 2024-06-02 NOTE — PROGRESS NOTE ADULT - SUBJECTIVE AND OBJECTIVE BOX
Gyn ONC Progress Note POD#12 HD#4    INCOMPLETE NOTE    Subjective:   Pt seen and examined at bedside. Overnight patient evaluated for serous vaginal discharge.  This AM, patient reports pain well controlled.  Endorses **continued vaginal discharge.  Patient ambulating, passing flatus, tolerating regular diet.  **continues to endorse cough.  Pt denies fever, chills, chest pain, SOB, nausea, vomiting, lightheadedness, dizziness.      Objective:  T(F): 98.4 (06-02-24 @ 01:46), Max: 100.7 (06-01-24 @ 17:43)  HR: 76 (06-02-24 @ 01:46) (76 - 100)  BP: 105/70 (06-02-24 @ 01:46) (96/61 - 115/67)  RR: 17 (06-02-24 @ 01:46) (16 - 17)  SpO2: 100% (06-02-24 @ 01:46) (98% - 100%)  Wt(kg): --  I&O's Summary    31 May 2024 07:01  -  01 Jun 2024 07:00  --------------------------------------------------------  IN: 1660 mL / OUT: 1350 mL / NET: 310 mL    01 Jun 2024 07:01  -  02 Jun 2024 05:06  --------------------------------------------------------  IN: 1690 mL / OUT: 2200 mL / NET: -510 mL      CAPILLARY BLOOD GLUCOSE          MEDICATIONS  (STANDING):  acetaminophen     Tablet .. 975 milliGRAM(s) Oral every 6 hours  apixaban 2.5 milliGRAM(s) Oral every 12 hours  benzocaine/menthol Lozenge 1 Lozenge Oral every 4 hours  benzonatate 100 milliGRAM(s) Oral every 8 hours  famotidine    Tablet 20 milliGRAM(s) Oral daily  fluticasone propionate 50 MICROgram(s)/spray Nasal Spray 2 Spray(s) Both Nostrils two times a day  guaiFENesin  milliGRAM(s) Oral every 12 hours  ibuprofen  Tablet. 600 milliGRAM(s) Oral every 6 hours  piperacillin/tazobactam IVPB.. 3.375 Gram(s) IV Intermittent every 8 hours  sodium chloride 0.65% Nasal 1 Spray(s) Both Nostrils every 3 hours    MEDICATIONS  (PRN):  diphenhydrAMINE 25 milliGRAM(s) Oral every 6 hours PRN cough  simethicone 80 milliGRAM(s) Chew every 6 hours PRN Gas      Physical Exam:  General: NAD  CV: RRR  Lungs: CTA b/l  Abdomen: Soft, non-tender diffusely, non distended, +bowel sounds  Incision: Midline vertical incision, CDI, dermabond prineo in place   : no bleeding on pad  Ext: No pain or edema    LABS:            10.6      10.59 )------------( 381        ( 06-01-24 @ 05:46 )            31.6    06-01    138    |  102    |  7      ----------------------------<  96     3.9     |  19<L>  |  0.67     Ca    9.2        01 Jun 2024 05:46    TPro  7.2    /  Alb  3.6    /  TBili  0.7    /  DBili  x      /  AST  337<H>  /  ALT  285<H>  /  AlkPhos  257<H>  06-01          Urinalysis Basic - ( 01 Jun 2024 05:46 )    Color: x / Appearance: x / SG: x / pH: x  Gluc: 96 mg/dL / Ketone: x  / Bili: x / Urobili: x   Blood: x / Protein: x / Nitrite: x   Leuk Esterase: x / RBC: x / WBC x   Sq Epi: x / Non Sq Epi: x / Bacteria: x           Gyn ONC Progress Note POD#12 HD#4    Subjective:   Pt seen and examined at bedside. Overnight patient evaluated for serous vaginal discharge.  This AM, patient reports pain well controlled.  Reports vaginal discharge decreased overnight, but noticed increased discharge when standing up this AM to brush teeth, remains clear/light yellow per patient.  Endorses mild abdominal/suprapubic pain when urinating.  Patient ambulating, passing flatus, tolerating regular diet.  Reports cough/congestion improving.  Pt denies fever, chills, chest pain, SOB, nausea, vomiting, lightheadedness, dizziness.      Objective:  T(F): 98.4 (06-02-24 @ 01:46), Max: 100.7 (06-01-24 @ 17:43)  HR: 76 (06-02-24 @ 01:46) (76 - 100)  BP: 105/70 (06-02-24 @ 01:46) (96/61 - 115/67)  RR: 17 (06-02-24 @ 01:46) (16 - 17)  SpO2: 100% (06-02-24 @ 01:46) (98% - 100%)  Wt(kg): --  I&O's Summary    31 May 2024 07:01  -  01 Jun 2024 07:00  --------------------------------------------------------  IN: 1660 mL / OUT: 1350 mL / NET: 310 mL    01 Jun 2024 07:01  -  02 Jun 2024 05:06  --------------------------------------------------------  IN: 1690 mL / OUT: 2200 mL / NET: -510 mL      CAPILLARY BLOOD GLUCOSE          MEDICATIONS  (STANDING):  acetaminophen     Tablet .. 975 milliGRAM(s) Oral every 6 hours  apixaban 2.5 milliGRAM(s) Oral every 12 hours  benzocaine/menthol Lozenge 1 Lozenge Oral every 4 hours  benzonatate 100 milliGRAM(s) Oral every 8 hours  famotidine    Tablet 20 milliGRAM(s) Oral daily  fluticasone propionate 50 MICROgram(s)/spray Nasal Spray 2 Spray(s) Both Nostrils two times a day  guaiFENesin  milliGRAM(s) Oral every 12 hours  ibuprofen  Tablet. 600 milliGRAM(s) Oral every 6 hours  piperacillin/tazobactam IVPB.. 3.375 Gram(s) IV Intermittent every 8 hours  sodium chloride 0.65% Nasal 1 Spray(s) Both Nostrils every 3 hours    MEDICATIONS  (PRN):  diphenhydrAMINE 25 milliGRAM(s) Oral every 6 hours PRN cough  simethicone 80 milliGRAM(s) Chew every 6 hours PRN Gas      Physical Exam:  General: NAD  CV: RRR  Lungs: CTA b/l  Abdomen: Soft, non-tender diffusely, non distended, +bowel sounds  Incision: Midline vertical incision, CDI, dermabond prineo in place   : no bleeding on pad, minimal light yellow drainage on pad  Ext: No pain or edema    LABS:            10.6      10.59 )------------( 381        ( 06-01-24 @ 05:46 )            31.6    06-01    138    |  102    |  7      ----------------------------<  96     3.9     |  19<L>  |  0.67     Ca    9.2        01 Jun 2024 05:46    TPro  7.2    /  Alb  3.6    /  TBili  0.7    /  DBili  x      /  AST  337<H>  /  ALT  285<H>  /  AlkPhos  257<H>  06-01          Urinalysis Basic - ( 01 Jun 2024 05:46 )    Color: x / Appearance: x / SG: x / pH: x  Gluc: 96 mg/dL / Ketone: x  / Bili: x / Urobili: x   Blood: x / Protein: x / Nitrite: x   Leuk Esterase: x / RBC: x / WBC x   Sq Epi: x / Non Sq Epi: x / Bacteria: x

## 2024-06-02 NOTE — CHART NOTE - NSCHARTNOTEFT_GEN_A_CORE
Delayed documentation 2/2 to clinical duties    Patient seen approximately 6pm due to call from RN that patient with fever to 39.2C.  CTAP with IV contrast ordered, BCx x2 ordered.  Upon arrival to bedside, patient sitting up in chair having blood cultures drawn.  Patient endorsed feeling feverish with chills and cough, but otherwise denied lightheadedness, dizziness, chest pain, shortness of breath, nausea, vomiting.  Denied abdominal pain and reported that her vaginal discharge had been stable throughout the day since last evaluation.    Vital Signs Last 24 Hrs  T(C): 37.9 (02 Jun 2024 19:02), Max: 39.2 (02 Jun 2024 17:10)  T(F): 100.2 (02 Jun 2024 19:02), Max: 102.5 (02 Jun 2024 17:10)  HR: 106 (02 Jun 2024 17:10) (76 - 106)  BP: 99/72 (02 Jun 2024 17:10) (96/59 - 108/52)  BP(mean): --  RR: 19 (02 Jun 2024 17:10) (16 - 19)  SpO2: 100% (02 Jun 2024 17:10) (99% - 100%)    Parameters below as of 02 Jun 2024 17:10  Patient On (Oxygen Delivery Method): room air    PHYSICAL EXAM:   Gen: appears uncomfortable with chills, alert and oriented x 3  Cardiovascular: clinically well perfused   Respiratory: breathing comfortably on RA  Abd: soft, nontender, nondistended  : minimal serous discharge on pad    A/P: Patient is a 30 yo POD#12 s/p  Ex-Lap, TREVON, BSO, Pelvic and para-aortic LND, Omentectomy, Appendectomy who is readmitted with fevers of unknown origin on Zosyn with downtrending WBC, now with fever to 39.2C  - f/u BCx x2  - f/u CTAP w/IV contrast to r/o intraabdominal abscess  - ID reconsulted for abx recommendations given recent high fever, recommended to remain on Zosyn for now and to broaden to Vanc/Zosyn if clinically worsening overnight  - Continue to monitor fever curve and WBC count    d/w SAMSON Short PGY4 and Dr. Elder Gyn Onc Fellow  RONI Stephenson PGY2 Aleve/ tylenol  0500 am

## 2024-06-03 ENCOUNTER — APPOINTMENT (OUTPATIENT)
Dept: GYNECOLOGIC ONCOLOGY | Facility: CLINIC | Age: 29
End: 2024-06-03

## 2024-06-03 DIAGNOSIS — Z98.890 OTHER SPECIFIED POSTPROCEDURAL STATES: ICD-10-CM

## 2024-06-03 LAB
ADD ON TEST-SPECIMEN IN LAB: SIGNIFICANT CHANGE UP
ALBUMIN SERPL ELPH-MCNC: 3.9 G/DL — SIGNIFICANT CHANGE UP (ref 3.3–5)
ALP SERPL-CCNC: 352 U/L — HIGH (ref 40–120)
ALT FLD-CCNC: 353 U/L — HIGH (ref 4–33)
ANION GAP SERPL CALC-SCNC: 14 MMOL/L — SIGNIFICANT CHANGE UP (ref 7–14)
AST SERPL-CCNC: 186 U/L — HIGH (ref 4–32)
BASOPHILS # BLD AUTO: 0.05 K/UL — SIGNIFICANT CHANGE UP (ref 0–0.2)
BASOPHILS NFR BLD AUTO: 0.5 % — SIGNIFICANT CHANGE UP (ref 0–2)
BILIRUB DIRECT SERPL-MCNC: <0.2 MG/DL — SIGNIFICANT CHANGE UP (ref 0–0.3)
BILIRUB INDIRECT FLD-MCNC: >0.4 MG/DL — SIGNIFICANT CHANGE UP (ref 0–1)
BILIRUB SERPL-MCNC: 0.6 MG/DL — SIGNIFICANT CHANGE UP (ref 0.2–1.2)
BUN SERPL-MCNC: 7 MG/DL — SIGNIFICANT CHANGE UP (ref 7–23)
CALCIUM SERPL-MCNC: 9.2 MG/DL — SIGNIFICANT CHANGE UP (ref 8.4–10.5)
CHLORIDE SERPL-SCNC: 102 MMOL/L — SIGNIFICANT CHANGE UP (ref 98–107)
CO2 SERPL-SCNC: 22 MMOL/L — SIGNIFICANT CHANGE UP (ref 22–31)
CREAT SERPL-MCNC: 0.72 MG/DL — SIGNIFICANT CHANGE UP (ref 0.5–1.3)
EGFR: 115 ML/MIN/1.73M2 — SIGNIFICANT CHANGE UP
EOSINOPHIL # BLD AUTO: 0.12 K/UL — SIGNIFICANT CHANGE UP (ref 0–0.5)
EOSINOPHIL NFR BLD AUTO: 1.2 % — SIGNIFICANT CHANGE UP (ref 0–6)
GGT SERPL-CCNC: 371 U/L — HIGH (ref 5–36)
GLUCOSE SERPL-MCNC: 100 MG/DL — HIGH (ref 70–99)
HCT VFR BLD CALC: 33.3 % — LOW (ref 34.5–45)
HGB BLD-MCNC: 11 G/DL — LOW (ref 11.5–15.5)
IANC: 7.23 K/UL — SIGNIFICANT CHANGE UP (ref 1.8–7.4)
IMM GRANULOCYTES NFR BLD AUTO: 0.9 % — SIGNIFICANT CHANGE UP (ref 0–0.9)
LYMPHOCYTES # BLD AUTO: 1.35 K/UL — SIGNIFICANT CHANGE UP (ref 1–3.3)
LYMPHOCYTES # BLD AUTO: 14 % — SIGNIFICANT CHANGE UP (ref 13–44)
MAGNESIUM SERPL-MCNC: 2.2 MG/DL — SIGNIFICANT CHANGE UP (ref 1.6–2.6)
MCHC RBC-ENTMCNC: 27.4 PG — SIGNIFICANT CHANGE UP (ref 27–34)
MCHC RBC-ENTMCNC: 33 GM/DL — SIGNIFICANT CHANGE UP (ref 32–36)
MCV RBC AUTO: 83 FL — SIGNIFICANT CHANGE UP (ref 80–100)
MONOCYTES # BLD AUTO: 0.8 K/UL — SIGNIFICANT CHANGE UP (ref 0–0.9)
MONOCYTES NFR BLD AUTO: 8.3 % — SIGNIFICANT CHANGE UP (ref 2–14)
NEUTROPHILS # BLD AUTO: 7.23 K/UL — SIGNIFICANT CHANGE UP (ref 1.8–7.4)
NEUTROPHILS NFR BLD AUTO: 75.1 % — SIGNIFICANT CHANGE UP (ref 43–77)
NRBC # BLD: 0 /100 WBCS — SIGNIFICANT CHANGE UP (ref 0–0)
NRBC # FLD: 0 K/UL — SIGNIFICANT CHANGE UP (ref 0–0)
PHOSPHATE SERPL-MCNC: 3.1 MG/DL — SIGNIFICANT CHANGE UP (ref 2.5–4.5)
PLATELET # BLD AUTO: 402 K/UL — HIGH (ref 150–400)
POTASSIUM SERPL-MCNC: 3.8 MMOL/L — SIGNIFICANT CHANGE UP (ref 3.5–5.3)
POTASSIUM SERPL-SCNC: 3.8 MMOL/L — SIGNIFICANT CHANGE UP (ref 3.5–5.3)
PROT SERPL-MCNC: 7.4 G/DL — SIGNIFICANT CHANGE UP (ref 6–8.3)
RBC # BLD: 4.01 M/UL — SIGNIFICANT CHANGE UP (ref 3.8–5.2)
RBC # FLD: 13.2 % — SIGNIFICANT CHANGE UP (ref 10.3–14.5)
SODIUM SERPL-SCNC: 138 MMOL/L — SIGNIFICANT CHANGE UP (ref 135–145)
WBC # BLD: 9.64 K/UL — SIGNIFICANT CHANGE UP (ref 3.8–10.5)
WBC # FLD AUTO: 9.64 K/UL — SIGNIFICANT CHANGE UP (ref 3.8–10.5)

## 2024-06-03 PROCEDURE — 99232 SBSQ HOSP IP/OBS MODERATE 35: CPT

## 2024-06-03 RX ORDER — MEROPENEM 1 G/30ML
1000 INJECTION INTRAVENOUS EVERY 8 HOURS
Refills: 0 | Status: DISCONTINUED | OUTPATIENT
Start: 2024-06-03 | End: 2024-06-04

## 2024-06-03 RX ADMIN — Medication 25 MILLIGRAM(S): at 01:59

## 2024-06-03 RX ADMIN — Medication 600 MILLIGRAM(S): at 22:03

## 2024-06-03 RX ADMIN — Medication 100 MILLIGRAM(S): at 14:03

## 2024-06-03 RX ADMIN — Medication 2 SPRAY(S): at 05:54

## 2024-06-03 RX ADMIN — Medication 600 MILLIGRAM(S): at 05:53

## 2024-06-03 RX ADMIN — Medication 600 MILLIGRAM(S): at 17:21

## 2024-06-03 RX ADMIN — PIPERACILLIN AND TAZOBACTAM 25 GRAM(S): 4; .5 INJECTION, POWDER, LYOPHILIZED, FOR SOLUTION INTRAVENOUS at 17:21

## 2024-06-03 RX ADMIN — BENZOCAINE AND MENTHOL 1 LOZENGE: 5; 1 LIQUID ORAL at 10:07

## 2024-06-03 RX ADMIN — Medication 600 MILLIGRAM(S): at 05:54

## 2024-06-03 RX ADMIN — SIMETHICONE 80 MILLIGRAM(S): 80 TABLET, CHEWABLE ORAL at 06:32

## 2024-06-03 RX ADMIN — BENZOCAINE AND MENTHOL 1 LOZENGE: 5; 1 LIQUID ORAL at 02:00

## 2024-06-03 RX ADMIN — PIPERACILLIN AND TAZOBACTAM 25 GRAM(S): 4; .5 INJECTION, POWDER, LYOPHILIZED, FOR SOLUTION INTRAVENOUS at 10:07

## 2024-06-03 RX ADMIN — BENZOCAINE AND MENTHOL 1 LOZENGE: 5; 1 LIQUID ORAL at 21:32

## 2024-06-03 RX ADMIN — BENZOCAINE AND MENTHOL 1 LOZENGE: 5; 1 LIQUID ORAL at 17:21

## 2024-06-03 RX ADMIN — Medication 600 MILLIGRAM(S): at 17:54

## 2024-06-03 RX ADMIN — Medication 600 MILLIGRAM(S): at 16:54

## 2024-06-03 RX ADMIN — BENZOCAINE AND MENTHOL 1 LOZENGE: 5; 1 LIQUID ORAL at 05:53

## 2024-06-03 RX ADMIN — Medication 600 MILLIGRAM(S): at 23:03

## 2024-06-03 RX ADMIN — PIPERACILLIN AND TAZOBACTAM 25 GRAM(S): 4; .5 INJECTION, POWDER, LYOPHILIZED, FOR SOLUTION INTRAVENOUS at 01:49

## 2024-06-03 RX ADMIN — Medication 100 MILLIGRAM(S): at 21:32

## 2024-06-03 RX ADMIN — BENZOCAINE AND MENTHOL 1 LOZENGE: 5; 1 LIQUID ORAL at 14:03

## 2024-06-03 RX ADMIN — APIXABAN 2.5 MILLIGRAM(S): 2.5 TABLET, FILM COATED ORAL at 05:54

## 2024-06-03 RX ADMIN — Medication 100 MILLIGRAM(S): at 06:32

## 2024-06-03 RX ADMIN — APIXABAN 2.5 MILLIGRAM(S): 2.5 TABLET, FILM COATED ORAL at 17:22

## 2024-06-03 RX ADMIN — Medication 600 MILLIGRAM(S): at 07:00

## 2024-06-03 NOTE — PROGRESS NOTE ADULT - ATTENDING COMMENTS
Patient seen and examined at bedside with team at 8:30am, agree with above note, including assessment and plan. Abdomen benign. Discussed today's plan of care with patient, all questions answered. Awaiting CT read.   addendum: based on CT read, continue abx and monitor closely, vag cuff abscess appears to be draining spontaneously. case d/w Dr. Schultz.  LDS

## 2024-06-03 NOTE — PROGRESS NOTE ADULT - ASSESSMENT
28 yo POD#12 s/p  Ex-Lap, TREVON, BSO, Pelvic and para-aortic LND, Omentectomy, Appendectomy, Frozen = Carcinoma. Patient was discharged on POD 4 with improved pain control and afebrile. At home she was in her usual state of health until 5/29 and presented to the ED on 5/30 in the setting of fevers of unknown origin. Patient recently post-op with URI symptoms. Unclear whether fevers are from respiratory infection vs SSI. CTA neg for PE, but showing small pulmonary nodules. CT Abd/Pelvis reveals post-surgical changes with possible seroma in R adnexa. UA neg and RVP neg.  UCx now positive for E. Fecalis.  Patient admitted for further evaluation and treatment with IV abx.  Patient with recent fever to 38.2 this AM.     Neuro: PO acetaminophen and ibuprofen PRN  CV: Hemodynamically stable   Pulm: O2 sat WNL on RA. Continue encouraging IS use  - throat lozenges for cough support  - CTA with no PE, but with multiple bilateral lower lobe sub-4 mm nodules, without significant interval change compared with the immediate prior CT chest 5/14/2024.  - CXR with clear lungs  - Pulm consulted, stating unlikely fevers 2/2 to pulmonary nodules, would defer broncoscopy at this time, recommended trial cough suppression with benzonatate 100mg TID and flonase 2 sprays each nostril BID  GI: Tolerating regular diet  : Voiding spontaneously  - Patient with clear/yellow vaginal discharge, evaluated overnight  - Consider possible fluid analysis with Cr vs double dye test today to evaluate for fistula vs draining seroma  Heme: apixaban 2.5mg BID; SCDs while in bed  ID: appreciate ID recommendations  - Cont Zosyn (5/30 -)  - Yesterday (6/1) attempt made to transition patient to PO abx, however given recurrent fever will continue on Zosyn for now  - Leukocytosis downtrending 17->12.51->10.59  - Procalcitonin 0.24, full viral panel neg   - f/u acute hepatitis panel, HIV, and MRSA swab  - F/u BCx  - UCx (5/30): E. Fecalis  - HIV(6/1): neg  - continue to monitor fever curve  - GGT (6/1): 266  - RUQ sono (6/1): Centimeter sized central RIGHT hyperechoic lesion, Contracted gallbladder without stones. Possible millimeter size gallbladder mural polyp.  Likely noncontributory to fevers  - f/u LLE dopplers (6/1) to r/o DVT in setting of fevers  - ID recommendations: continue Zosyn, consider US liver, consider US pelvis, consider LE duplex to r/o DVT, serum GGT to r/o gallbladder disease, f/u Bcx and adjust abx therapy accordingly, f/u pulmonary nodules  FEN: SLIV, Replete electrolytes PRN     Dispo: continue inpatient management    Huyen Short, PGY-4  28 yo POD#12 s/p  Ex-Lap, TREVON, BSO, Pelvic and para-aortic LND, Omentectomy, Appendectomy, Frozen = Carcinoma. Patient was discharged on POD 4 with improved pain control and afebrile. At home she was in her usual state of health until 5/29 and presented to the ED on 5/30 in the setting of fevers of unknown origin. Patient recently post-op with URI symptoms. CTA neg for PE, but showing small pulmonary nodules. CT Abd/Pelvis reveals post-surgical changes with possible seroma in R adnexa. UA neg and RVP neg. Patient admitted for further evaluation and treatment with IV abx. Patient now with 3 fevers in the past 48 hours, with recent fever to 38.2 this AM at 6AM.     Neuro: PO ibuprofen PRN, holding acetamenophen in the setting of worsening transaminitis  CV: Hemodynamically stable   Pulm: O2 sat WNL on RA. Continue encouraging IS use  - throat lozenges for cough support  - CTA with no PE, but with multiple bilateral lower lobe sub-4 mm nodules, without significant interval change compared with the immediate prior CT chest 5/14/2024.  - CXR with clear lungs  - Pulm consulted, stating unlikely fevers 2/2 to pulmonary nodules, would defer broncoscopy at this time, recommended trial cough suppression with benzonatate 100mg TID and flonase 2 sprays each nostril BID  GI: Tolerating regular diet  : Voiding spontaneously  - Patient with clear/yellow vaginal discharge, tampon test performed on 6/2 to evaluate for possible vesicovaginal fistula, negative. On VE by Dr. Lew fluctuance noted at the cuff concerning for possible collection  - f/u CTAP read (6/2)  Heme: apixaban 2.5mg BID; SCDs while in bed  ID: appreciate ID recommendations  - Cont Zosyn (5/30 -)  - Leukocytosis downtrending 17->12.51->10.59  - Procalcitonin 0.24, full viral panel neg   - Acute hepatitis panel, HIV, and MRSA swab neg  - UCx (5/30): >3 specimen, previosly growing with E faecalis   - continue to monitor fever curve  - GGT (6/1): 266->371  - CTAP(5/30): Hypoattenuating structure measuring 3.1 x 2.4 cm in the region of the ligated right ovarian vein, possibly seroma or lymphangioma.   - RUQ sono (6/1): Centimeter sized central RIGHT hyperechoic lesion, Contracted gallbladder without stones. Possible millimeter size gallbladder mural polyp.  Likely noncontributory to fevers  - LLE dopplers (6/1) neg  - ID recommendations: continue Zosyn, consider US liver, consider US pelvis, consider LE duplex to r/o DVT, serum GGT to r/o gallbladder disease, f/u Bcx and adjust abx therapy accordingly, f/u pulmonary nodules  - F/u BCx(5/30 and 6/2)  - f/u ID reccs  FEN: SLIV, Replete electrolytes PRN     Dispo: continue inpatient management. Will f/u CTAP read and f/u ID recommendations    Huyen Short, PGY-4  30 yo POD#12 s/p  Ex-Lap, TREVON, BSO, Pelvic and para-aortic LND, Omentectomy, Appendectomy, Frozen = Carcinoma. Patient was discharged on POD 4 with improved pain control and afebrile. At home she was in her usual state of health until 5/29 and presented to the ED on 5/30 in the setting of fevers of unknown origin. Patient recently post-op with URI symptoms. CTA neg for PE, but showing small pulmonary nodules. CT Abd/Pelvis reveals post-surgical changes with possible seroma in R adnexa. UA neg and RVP neg. Patient admitted for further evaluation and treatment with IV abx. Patient now with 3 fevers in the past 48 hours, with recent fever to 38.2 this AM at 6AM.     Neuro: PO ibuprofen PRN, holding acetamenophen in the setting of worsening transaminitis  CV: Hemodynamically stable   Pulm: O2 sat WNL on RA. Continue encouraging IS use  - throat lozenges for cough support  - CTA with no PE, but with multiple bilateral lower lobe sub-4 mm nodules, without significant interval change compared with the immediate prior CT chest 5/14/2024.  - CXR with clear lungs  - Pulm consulted, stating unlikely fevers 2/2 to pulmonary nodules, would defer broncoscopy at this time, recommended trial cough suppression with benzonatate 100mg TID and flonase 2 sprays each nostril BID  GI: Tolerating regular diet  : Voiding spontaneously  - Patient with clear/yellow vaginal discharge, tampon test performed on 6/2 to evaluate for possible vesicovaginal fistula, negative. On VE by Dr. Lew fluctuance noted at the cuff concerning for possible collection  - f/u CTAP read (6/2)  Heme: apixaban 2.5mg BID; SCDs while in bed  ID: appreciate ID recommendations  - Cont Zosyn (5/30 -)  - Leukocytosis downtrending 17->12.51->10.59  - Procalcitonin 0.24, full viral panel neg   - Acute hepatitis panel, HIV, and MRSA swab neg  - UCx (5/30): >3 specimen, previosly growing with E faecalis   - continue to monitor fever curve  - GGT (6/1): 266->371  - CT Chest and AP(5/30): Mult b/l lower lub sub-4mm nodules similar to prior. No PE. No Pleural effusion. R hepatic lobe lesions 1.2cm, stable compared to prior. Hypoattenuating structure measuring 3.1 x 2.4 cm in the region of the ligated right ovarian vein, possibly seroma or lymphangioma.   - RUQ sono (6/1): Centimeter sized central RIGHT hyperechoic lesion, Contracted gallbladder without stones. Possible millimeter size gallbladder mural polyp.  Likely noncontributory to fevers  - LLE dopplers (6/1) neg  - ID recommendations: continue Zosyn, consider US liver, consider US pelvis, consider LE duplex to r/o DVT, serum GGT to r/o gallbladder disease, f/u Bcx and adjust abx therapy accordingly, f/u pulmonary nodules  - F/u BCx(5/30 and 6/2)  - f/u ID reccs  FEN: SLIV, Replete electrolytes PRN     Dispo: continue inpatient management. Will f/u CTAP read and f/u ID recommendations    Huyen Short, PGY-4

## 2024-06-03 NOTE — CONSULT NOTE ADULT - ASSESSMENT
Interventional Radiology    Evaluate for Procedure: pelvic/abdominal collection drainage     HPI: 30 yo POD 9 Ex-Lap, TREVON, BSO, Pelvic and para-aortic LND, Omentectomy, Appendectomy, Frozen = Carcinoma. Patient was discharged on POD 4 with improved pain control and afebrile. At home she was in her usual state of health until yesterday. She spiked a low grade temp at 4p yesterday at 4p of 100.7. She took some Tylenol and fever resolved. She then had a 101.5 at 9m on 5/29. She called the answering service which advised her to come to ED. She did not come overnight because she felt weak and tired. She took Tylenol and Motrin overnight. Today around 6a she had another low grade temp to 100.4 and at 145p today had a temperature of 101.2.   She endorses URI symptoms, including cough, and fevers before the surgery, prompting evaluation by PCP, who prescribed her amoxicillin. She reports finishing the course of abx before the surgery. Denies any sick contacts and is not having any notable GI/ symptoms. States loose stools but no diarrhea and denies any abnormal vaginal bleeding or discharge. Currently denies productive cough. Denies runny nose.     GYN Onc Hx  -Presented to the ER at [facility] on 5/6 with 3 days of RLQ pain, nausea, and decreased appetite. Workup included CT A/P which revealed abnormally enlarged heterogeneous solid and cystic  appearance of the left ovary/fallopian tube measuring 13.6 x 8.1 x 11.4cm highly suspicious for carcinoma.  -CA-125 is elevated at 810, ,  33, CEA 0.8  -5/2024 CT A/P - bilateral ovarian/adnexal solid and cystic masses, small volume carcinomatosis, trace ascites, trace pleural effusions, indeterminant hepatic lesions,  -5/21:  Ex-Lap, TREVON, BSO, Pelvic and para-aortic LND, Omentectomy, Appendectomy, Frozen = Carcinoma.     Allergies: azithromycin (Hives)    Medications (Abx/Cardiac/Anticoagulation/Blood Products)    apixaban: 2.5 milliGRAM(s) Oral (06-03 @ 05:54)  piperacillin/tazobactam IVPB..: 25 mL/Hr IV Intermittent (06-03 @ 10:07)    Data:    T(C): 36.6  HR: 92  BP: 103/67  RR: 18  SpO2: 100%    -WBC 9.64 / HgB 11.0 / Hct 33.3 / Plt 402  -Na 138 / Cl 102 / BUN 7 / Glucose 100  -K 3.8 / CO2 22 / Cr 0.72  - / Alk Phos 352 / T.Bili 0.6  -INR 1.28 / PTT 36.6      Radiology:   - relevant imaging reviewed     Assessment/Plan:   29F s/p ex Ex-Lap, TREVON, BSO, Pelvic and para-aortic LND, Omentectomy, Appendectomy, for bl cystic adnexal masses, Frozen = Carcinoma. Pt d/c POD4 representing with fevers, found to have pelvic and aortocaval collections, IR consulted for drainage.     - Imaging reviewed. Aortocaval collection not accessible and in close proximity to great vessels and ureter with increased risk of injury. Pelvic collection not well formed with difficult window for access.   - wbc downtrending on abx. Fever this am.   - Recommend continued conservative management at this time. If pt's clinical status worsens, get rpt CT and reach out to IR for reintervention. If acute decompensation, please page IR for reevaluation for intervention.   - d/w team    --  Syd Coleman MD, PGY-3  Vascular and Interventional Radiology   Available on [employer] Teams    - Non-emergent consults: Place IR consult order in [place]  - Emergent issues (pager): [facility] [phone]; [email or web address] [phone]; [phone]  - Scheduling questions: [facility] [phone]; [employer] [phone]  - Clinic/outpatient booking: [facility] [phone]; [employer] [phone]

## 2024-06-03 NOTE — PROGRESS NOTE ADULT - SUBJECTIVE AND OBJECTIVE BOX
R4 [facility] Progress Note    POD#13   HD#6    Patient seen and examined at bedside. This AM patient had another fever, 38.2.  Continues to report non-productive cough. Also continues to have persistent clear vaginal leakage. Reports mild abdominal caty. Patient is ambulating and tolerating PO. Denies CP, SOB, palpitations urinary sx, dizziness, lightheadedness, N/V.     Vital Signs Last 24 Hours  T(C): 38.2 (06-03-24 @ 05:53), Max: 39.2 (06-02-24 @ 17:10)  HR: 90 (06-03-24 @ 05:53) (84 - 106)  BP: 94/80 (06-03-24 @ 05:53) (93/59 - 101/90)  RR: 18 (06-03-24 @ 05:53) (16 - 19)  SpO2: 99% (06-03-24 @ 05:53) (99% - 100%)    I&O's Summary    02 Jun 2024 07:01  -  03 Jun 2024 07:00  --------------------------------------------------------  IN: 700 mL / OUT: 1700 mL / NET: -1000 mL    Physical Exam:  General: NAD  CV: RRR  Lungs: CTA b/l  Abdomen: Soft, non-tender diffusely, non distended, no rebound, guarding, rigidity.   Incision: Midline vertical incision with dermabond prineo in place   Ext: No pain or edema, SCD's in place    Labs:                        11.0   9.64  )-----------( 402      ( 03 Jun 2024 05:58 )             33.3   baso 0.5    eos 1.2    imm gran 0.9    lymph 14.0   mono 8.3    poly 75.1                         11.3   8.71  )-----------( 448      ( 02 Jun 2024 18:11 )             35.0   baso 0.5    eos 1.5    imm gran 0.8    lymph 15.3   mono 6.1    poly 75.8                         11.0   9.27  )-----------( 403      ( 02 Jun 2024 05:38 )             32.5   baso 0.8    eos 2.6    imm gran 1.1    lymph 12.8   mono 7.8    poly 74.9                         10.6   10.59 )-----------( 381      ( 01 Jun 2024 05:46 )             31.6   baso 0.7    eos 1.6    imm gran 0.8    lymph 14.6   mono 7.6    poly 74.7       MEDICATIONS  (STANDING):  apixaban 2.5 milliGRAM(s) Oral every 12 hours  benzocaine/menthol Lozenge 1 Lozenge Oral every 4 hours  benzonatate 100 milliGRAM(s) Oral every 8 hours  famotidine    Tablet 20 milliGRAM(s) Oral daily  fluticasone propionate 50 MICROgram(s)/spray Nasal Spray 2 Spray(s) Both Nostrils two times a day  guaiFENesin  milliGRAM(s) Oral every 12 hours  ibuprofen  Tablet. 600 milliGRAM(s) Oral every 6 hours  piperacillin/tazobactam IVPB.. 3.375 Gram(s) IV Intermittent every 8 hours  sodium chloride 0.65% Nasal 1 Spray(s) Both Nostrils every 3 hours    MEDICATIONS  (PRN):  diphenhydrAMINE 25 milliGRAM(s) Oral every 6 hours PRN cough  simethicone 80 milliGRAM(s) Chew every 6 hours PRN Gas       R4 [facility] Progress Note    POD#13   HD#6    Patient seen and examined at bedside. This AM patient had another fever, 38.2.  Continues to report non-productive cough. Also continues to have persistent clear vaginal leakage. Reports mild abdominal caty. Patient is ambulating and tolerating PO. Denies CP, SOB, palpitations urinary sx, dizziness, lightheadedness, N/V.     Vital Signs Last 24 Hours  T(C): 38.2 (06-03-24 @ 05:53), Max: 39.2 (06-02-24 @ 17:10)  HR: 90 (06-03-24 @ 05:53) (84 - 106)  BP: 94/80 (06-03-24 @ 05:53) (93/59 - 101/90)  RR: 18 (06-03-24 @ 05:53) (16 - 19)  SpO2: 99% (06-03-24 @ 05:53) (99% - 100%)    I&O's Summary    02 Jun 2024 07:01  -  03 Jun 2024 07:00  --------------------------------------------------------  IN: 700 mL / OUT: 1700 mL / NET: -1000 mL    Physical Exam:  General: NAD  CV: RRR  Lungs: CTA b/l  Abdomen: Soft, non-tender diffusely, non distended, no rebound, guarding, rigidity.   Incision: Midline vertical incision with dermabond prineo in place   Ext: No pain or edema, SCD's in place    Labs:                        11.0   9.64  )-----------( 402      ( 03 Jun 2024 05:58 )             33.3       06-03    138  |  102  |  7   ----------------------------<  100<H>  3.8   |  22  |  0.72    Ca    9.2      03 Jun 2024 05:58  Phos  3.1     06-03  Mg     2.20     06-03    TPro  7.2  /  Alb  3.7  /  TBili  0.8  /  DBili  x   /  AST  442<H>  /  ALT  471<H>  /  AlkPhos  312<H>  06-02    Urinalysis Basic - ( 03 Jun 2024 05:58 )    Color: x / Appearance: x / SG: x / pH: x  Gluc: 100 mg/dL / Ketone: x  / Bili: x / Urobili: x   Blood: x / Protein: x / Nitrite: x   Leuk Esterase: x / RBC: x / WBC x   Sq Epi: x / Non Sq Epi: x / Bacteria: x      MEDICATIONS  (STANDING):  apixaban 2.5 milliGRAM(s) Oral every 12 hours  benzocaine/menthol Lozenge 1 Lozenge Oral every 4 hours  benzonatate 100 milliGRAM(s) Oral every 8 hours  famotidine    Tablet 20 milliGRAM(s) Oral daily  fluticasone propionate 50 MICROgram(s)/spray Nasal Spray 2 Spray(s) Both Nostrils two times a day  guaiFENesin  milliGRAM(s) Oral every 12 hours  ibuprofen  Tablet. 600 milliGRAM(s) Oral every 6 hours  piperacillin/tazobactam IVPB.. 3.375 Gram(s) IV Intermittent every 8 hours  sodium chloride 0.65% Nasal 1 Spray(s) Both Nostrils every 3 hours    MEDICATIONS  (PRN):  diphenhydrAMINE 25 milliGRAM(s) Oral every 6 hours PRN cough  simethicone 80 milliGRAM(s) Chew every 6 hours PRN Gas

## 2024-06-03 NOTE — CHART NOTE - NSCHARTNOTEFT_GEN_A_CORE
PM Rounding Note    Patient evaluated at bedside this afternoon. Denies any further fevers during the day. No pain. Continues to have some drainage.     Objective  T(C): 38.2 (06-03-24 @ 16:54), Max: 38.2 (06-03-24 @ 05:53)  HR: 96 (06-03-24 @ 16:54) (90 - 96)  BP: 103/61 (06-03-24 @ 16:54) (94/80 - 103/67)  RR: 18 (06-03-24 @ 16:54) (17 - 18)  SpO2: 100% (06-03-24 @ 16:54) (99% - 100%)  Wt(kg): --    06-02 @ 07:01  -  06-03 @ 07:00  --------------------------------------------------------  IN: 700 mL / OUT: 1700 mL / NET: -1000 mL    06-03 @ 07:01  -  06-03 @ 16:57  --------------------------------------------------------  IN: 720 mL / OUT: 550 mL / NET: 170 mL    Gen: NAD  Abd: Soft NT  Ext: NT BL    apixaban 2.5 milliGRAM(s) Oral every 12 hours  benzocaine/menthol Lozenge 1 Lozenge Oral every 4 hours  benzonatate 100 milliGRAM(s) Oral every 8 hours  diphenhydrAMINE 25 milliGRAM(s) Oral every 6 hours PRN  famotidine    Tablet 20 milliGRAM(s) Oral daily  fluticasone propionate 50 MICROgram(s)/spray Nasal Spray 2 Spray(s) Both Nostrils two times a day  guaiFENesin  milliGRAM(s) Oral every 12 hours  ibuprofen  Tablet. 600 milliGRAM(s) Oral every 6 hours  piperacillin/tazobactam IVPB.. 3.375 Gram(s) IV Intermittent every 8 hours  simethicone 80 milliGRAM(s) Chew every 6 hours PRN  sodium chloride 0.65% Nasal 1 Spray(s) Both Nostrils every 3 hours      30 yo POD#12 s/p  Ex-Lap, TREVON, BSO, Pelvic and para-aortic LND, Omentectomy, Appendectomy, Frozen = Carcinoma. Patient was discharged on POD 4 with improved pain control and afebrile. At home she was in her usual state of health until 5/29 and presented to the ED on 5/30 in the setting of fevers of unknown origin. Patient recently post-op with URI symptoms. CTA neg for PE, but showing small pulmonary nodules. CT Abd/Pelvis reveals post-surgical changes with possible seroma in R adnexa. UA neg and RVP neg. Patient admitted for further evaluation and treatment with IV abx. Patient now with 3 fevers in the past 48 hours, with recent fever to 38.2 this AM at 6AM.   - CT A/P resulted with a 7.0 x 2.5 x 2.4 cm abscess in the post hysterectomy bed. A 1.7 x 3.0 x 6.2 cm rim-enhancing fluid collection in the retroperitoneal space, lymphocele, or  possibly lymphocele, seroma, or abscess.  - IR consulted, do not recommend IR drainage at this time, recommend conservative management  - ID following, recommend continuing with zosyn  - C/w Zosyn(5/30-)  - AM labs    Huyen Short, PGY-4   Seen with GYN ONC team PM Rounding Note    Patient evaluated at bedside this afternoon. Denies any further fevers during the day. No pain. Continues to have some drainage.     Objective  T(C): 38.2 (06-03-24 @ 16:54), Max: 38.2 (06-03-24 @ 05:53)  HR: 96 (06-03-24 @ 16:54) (90 - 96)  BP: 103/61 (06-03-24 @ 16:54) (94/80 - 103/67)  RR: 18 (06-03-24 @ 16:54) (17 - 18)  SpO2: 100% (06-03-24 @ 16:54) (99% - 100%)  Wt(kg): --    06-02 @ 07:01  -  06-03 @ 07:00  --------------------------------------------------------  IN: 700 mL / OUT: 1700 mL / NET: -1000 mL    06-03 @ 07:01  -  06-03 @ 16:57  --------------------------------------------------------  IN: 720 mL / OUT: 550 mL / NET: 170 mL    Gen: NAD  Abd: Soft NT  Ext: NT BL    apixaban 2.5 milliGRAM(s) Oral every 12 hours  benzocaine/menthol Lozenge 1 Lozenge Oral every 4 hours  benzonatate 100 milliGRAM(s) Oral every 8 hours  diphenhydrAMINE 25 milliGRAM(s) Oral every 6 hours PRN  famotidine    Tablet 20 milliGRAM(s) Oral daily  fluticasone propionate 50 MICROgram(s)/spray Nasal Spray 2 Spray(s) Both Nostrils two times a day  guaiFENesin  milliGRAM(s) Oral every 12 hours  ibuprofen  Tablet. 600 milliGRAM(s) Oral every 6 hours  piperacillin/tazobactam IVPB.. 3.375 Gram(s) IV Intermittent every 8 hours  simethicone 80 milliGRAM(s) Chew every 6 hours PRN  sodium chloride 0.65% Nasal 1 Spray(s) Both Nostrils every 3 hours      30 yo POD#12 s/p  Ex-Lap, TREVON, BSO, Pelvic and para-aortic LND, Omentectomy, Appendectomy, Frozen = Carcinoma. Patient was discharged on POD 4 with improved pain control and afebrile. At home she was in her usual state of health until 5/29 and presented to the ED on 5/30 in the setting of fevers of unknown origin. Patient recently post-op with URI symptoms. CTA neg for PE, but showing small pulmonary nodules. CT Abd/Pelvis reveals post-surgical changes with possible seroma in R adnexa. UA neg and RVP neg. Patient admitted for further evaluation and treatment with IV abx. Patient now with 3 fevers in the past 48 hours, with recent fever to 38.2 this AM at 6AM.   - CT A/P resulted with a 7.0 x 2.5 x 2.4 cm abscess in the post hysterectomy bed. A 1.7 x 3.0 x 6.2 cm rim-enhancing fluid collection in the retroperitoneal space, lymphocele, or  possibly lymphocele, seroma, or abscess  - IR consulted, do not recommend IR drainage at this time, recommend conservative management  - Ucx to be collected  - ID following, recommend continuing with zosyn  - C/w Zosyn(5/30-)  - AM labs    Huyen Short, PGY-4   Seen with GYN ONC team PM Rounding Note    Patient evaluated at bedside this afternoon. Denies any further fevers during the day. No pain. Continues to have some drainage.     Objective  T(C): 38.2 (06-03-24 @ 16:54), Max: 38.2 (06-03-24 @ 05:53)  HR: 96 (06-03-24 @ 16:54) (90 - 96)  BP: 103/61 (06-03-24 @ 16:54) (94/80 - 103/67)  RR: 18 (06-03-24 @ 16:54) (17 - 18)  SpO2: 100% (06-03-24 @ 16:54) (99% - 100%)  Wt(kg): --    06-02 @ 07:01  -  06-03 @ 07:00  --------------------------------------------------------  IN: 700 mL / OUT: 1700 mL / NET: -1000 mL    06-03 @ 07:01  -  06-03 @ 16:57  --------------------------------------------------------  IN: 720 mL / OUT: 550 mL / NET: 170 mL    Gen: NAD  Abd: Soft NT  Ext: NT BL    apixaban 2.5 milliGRAM(s) Oral every 12 hours  benzocaine/menthol Lozenge 1 Lozenge Oral every 4 hours  benzonatate 100 milliGRAM(s) Oral every 8 hours  diphenhydrAMINE 25 milliGRAM(s) Oral every 6 hours PRN  famotidine    Tablet 20 milliGRAM(s) Oral daily  fluticasone propionate 50 MICROgram(s)/spray Nasal Spray 2 Spray(s) Both Nostrils two times a day  guaiFENesin  milliGRAM(s) Oral every 12 hours  ibuprofen  Tablet. 600 milliGRAM(s) Oral every 6 hours  piperacillin/tazobactam IVPB.. 3.375 Gram(s) IV Intermittent every 8 hours  simethicone 80 milliGRAM(s) Chew every 6 hours PRN  sodium chloride 0.65% Nasal 1 Spray(s) Both Nostrils every 3 hours      28 yo POD#12 s/p  Ex-Lap, TREVON, BSO, Pelvic and para-aortic LND, Omentectomy, Appendectomy, Frozen = Carcinoma. Patient was discharged on POD 4 with improved pain control and afebrile. At home she was in her usual state of health until 5/29 and presented to the ED on 5/30 in the setting of fevers of unknown origin. Patient recently post-op with URI symptoms. CTA neg for PE, but showing small pulmonary nodules. CT Abd/Pelvis reveals post-surgical changes with possible seroma in R adnexa. UA neg and RVP neg. Patient admitted for further evaluation and treatment with IV abx. Patient now with 3 fevers in the past 48 hours, with recent fever to 38.2 this AM at 6AM.   - CT A/P resulted with a 7.0 x 2.5 x 2.4 cm abscess in the post hysterectomy bed. A 1.7 x 3.0 x 6.2 cm rim-enhancing fluid collection in the retroperitoneal space, lymphocele, or  possibly lymphocele, seroma, or abscess  - IR consulted, do not recommend IR drainage at this time, recommend conservative management  - Ucx to be collected  - ID following, agree with expanding abx coverage to meropenam  -  godfrey Short, PGY-4   Seen with GYN ONC team

## 2024-06-03 NOTE — PROGRESS NOTE ADULT - ASSESSMENT
28 yo woman with ovarian/ adnexal mass     s/p TREVON- BSO, LN dissection 5/21  frozen -- carcinoma   final path pending     returns with fever, SOB, pleuritic chest pain     in retrospect patient reports was having high fever prior to surgery     CT no PE, pulmonary nodules, hypoattenuating structure surrounding surgical clip along ligated ovarian vein     Fever related to infection vs malignancy     blood cultures from 5/30 no growth x 72 h     Repeat CT sows pelvic abscess   -- For IR eval    Bacteruria with pyuria. Denies dysuria      c/w zosyn for now       await IR cultures

## 2024-06-03 NOTE — PROGRESS NOTE ADULT - SUBJECTIVE AND OBJECTIVE BOX
Follow Up:      Inverval History/ROS:Patient is a 29y old  Female who presents with a chief complaint of post op fevers (01 Jun 2024 04:50)      Had fever last night 102    cough a bit better        Allergies    azithromycin (Hives)  Kiwi (Hives (Mild))    Intolerances        ANTIMICROBIALS:  piperacillin/tazobactam IVPB.. 3.375 every 8 hours    MEDICATIONS  (STANDING):  apixaban 2.5 every 12 hours  benzonatate 100 every 8 hours  diphenhydrAMINE 25 every 6 hours PRN  famotidine    Tablet 20 daily  guaiFENesin  every 12 hours  ibuprofen  Tablet. 600 every 6 hours  simethicone 80 every 6 hours PRN        Vital Signs Last 24 Hrs  T(F): 97.9 (06-03-24 @ 13:22), Max: 102.5 (06-02-24 @ 17:10)  HR: 92 (06-03-24 @ 13:22)  BP: 103/67 (06-03-24 @ 13:22)  RR: 18 (06-03-24 @ 13:22)  SpO2: 100% (06-03-24 @ 13:22) (99% - 100%)      PHYSICAL EXAM:  General: non toxic  in chair   HEAD/EYES: [ ] PERRL [x ] white sclera [ ] icterus  ENT:  [ ] normal [x ] supple [ ] thrush [ ] pharyngeal exudate  Cardiovascular:   [ ] murmur [x ] normal [ ] PPM/AICD  Respiratory:  [x ] clear to ausculation bilaterally  GI:  [ x] soft, non-tender, normal bowel sounds  :  [ ] atkins [ ] no CVA tenderness   Musculoskeletal:  [ ] no synovitis  Neurologic:  [ ] non-focal exam   Skin:  [x ] no rash  Lymph: [x ] no lymphadenopathy  Psychiatric:  [x ] appropriate affect [x ] alert & oriented  Lines:  [x ] no phlebitis [ ] central line                            11.0   9.64  )-----------( 402      ( 03 Jun 2024 05:58 )             33.3 06-03    138  |  102  |  7   ----------------------------<  100  3.8   |  22  |  0.72  Ca    9.2      03 Jun 2024 05:58Phos  3.1     06-03Mg     2.20     06-03  TPro  7.4  /  Alb  3.9  /  TBili  0.6  /  DBili  <0.2  /  AST  186  /  ALT  353  /  AlkPhos  352  06-03      Culture - Urine (05.30.24 @ 19:43)   Specimen Source: Clean Catch Clean Catch (Midstream)  Culture Results:   Culture grew 3 or more types of organisms which indicate   collection contamination; consider recollection only if clinically   indicated.    Culture - Blood (05.30.24 @ 17:26)   Specimen Source: .Blood Blood-Peripheral  Culture Results:   No growth at 72 Hours      Culture - Blood (05.30.24 @ 17:15)   Specimen Source: .Blood Blood-Peripheral  Culture Results:   No growth at 72 Hours  RADIOLOGY:    r< from: CT Abdomen and Pelvis w/ IV Cont (06.02.24 @ 21:30) >    ACC: [id number] EXAM:  CT ABDOMEN AND PELVIS IC   ORDERED BY: ABEL UMANZOR     PROCEDURE DATE:  06/02/2024          INTERPRETATION:  CLINICAL INFORMATION: Fever of unknown origin and   vaginal discharge. Postop day 12 from ex-lap, TREVON/BSO, omentectomy,   appendectomy, and lymph node dissection.    COMPARISON: CT abdomen and pelvis 5/30/2024 and 5/6/2024. Abdominal   ultrasound 6/1/2024.    CONTRAST/COMPLICATIONS:  IV Contrast: Omnipaque 350  90 cc administered   10 cc discarded  Oral Contrast: NONE  Complications: None reported at time of study completion    PROCEDURE:  CT of the Abdomen and Pelvis was performed.  Sagittal and coronal reformats were performed.    FINDINGS:  LOWER CHEST: A 4 mm left lower lobe pulmonary nodule (2-5), unchanged.    LIVER: Normal morphology. A 1.1 cm hypoattenuating lesion in segment 8   (2-29), corresponding to hyperechoic lesion on prior ultrasound, possibly   hemangioma. Additional subcentimeter right hepatic lobe hypodensities too   small to characterize.  BILE DUCTS: Normal caliber.  GALLBLADDER: Contracted.  SPLEEN: Within normal limits.  PANCREAS: Within normal limits.  ADRENALS: Within normal limits.  KIDNEYS/URETERS: Within normal limits.    BLADDER: Small droplet of air within the urinary bladder, possibly   related to recent Atkins catheter placement.  REPRODUCTIVE ORGANS: Hysterectomy. A 7.0 x 2.5 x 2.4 cm (AP x TV x CC)   rim-enhancing fluid collection with small locules of air in the   post-hysterectomy bed (2-116).    BOWEL: No bowel obstruction. Appendectomy.  PERITONEUM: Trace ascites, decreased. Mild smooth peritoneal thickening   along the left paracolic gutter.  VESSELS: Within normal limits.  RETROPERITONEUM/LYMPH NODES: Status post para-aortic lymph node   dissection. A 1.7 x 3.0 x 6.2 cm rim-enhancing fluid collection in the   retroperitoneal space (2-74 and 4-51). No lymphadenopathy.  ABDOMINAL WALL: Within normal limits.  BONES: Within normal limits.    IMPRESSION:  A 7.0 x 2.5 x 2.4 cm abscess in the post hysterectomy bed.    Redemonstrated rim-enhancing fluid collection in the aortocaval space,   consistent with postoperative fluid collection, possibly lymphocele,   seroma, or abscess.    Indeterminate right hepatic lesion, possibly hemangioma or metastasis   (given the history of malignancy). Further evaluation with   contrast-enhanced MRI abdomen is recommended.    Findings were discussed by Dr. Castellanos with Dr. Short on 6/3/2024 9:38   AM with read back confirmation.    --- End of Report ---          CHIP CASTELLANOS MD; Resident Radiologist  This document has been electronically signed.  NICHOLAS CADE MD; Attending Radiologist  This document has been electronically signed. Bao  3 2024 12:12PM    < end of copied text >

## 2024-06-04 LAB
ALBUMIN SERPL ELPH-MCNC: 3.6 G/DL — SIGNIFICANT CHANGE UP (ref 3.3–5)
ALP SERPL-CCNC: 356 U/L — HIGH (ref 40–120)
ALT FLD-CCNC: 304 U/L — HIGH (ref 4–33)
ANION GAP SERPL CALC-SCNC: 16 MMOL/L — HIGH (ref 7–14)
AST SERPL-CCNC: 148 U/L — HIGH (ref 4–32)
BASOPHILS # BLD AUTO: 0.06 K/UL — SIGNIFICANT CHANGE UP (ref 0–0.2)
BASOPHILS NFR BLD AUTO: 0.7 % — SIGNIFICANT CHANGE UP (ref 0–2)
BILIRUB SERPL-MCNC: 0.3 MG/DL — SIGNIFICANT CHANGE UP (ref 0.2–1.2)
BLD GP AB SCN SERPL QL: NEGATIVE — SIGNIFICANT CHANGE UP
BUN SERPL-MCNC: 7 MG/DL — SIGNIFICANT CHANGE UP (ref 7–23)
CALCIUM SERPL-MCNC: 9.2 MG/DL — SIGNIFICANT CHANGE UP (ref 8.4–10.5)
CHLORIDE SERPL-SCNC: 101 MMOL/L — SIGNIFICANT CHANGE UP (ref 98–107)
CO2 SERPL-SCNC: 21 MMOL/L — LOW (ref 22–31)
CREAT SERPL-MCNC: 0.61 MG/DL — SIGNIFICANT CHANGE UP (ref 0.5–1.3)
CULTURE RESULTS: NO GROWTH — SIGNIFICANT CHANGE UP
CULTURE RESULTS: SIGNIFICANT CHANGE UP
CULTURE RESULTS: SIGNIFICANT CHANGE UP
EGFR: 122 ML/MIN/1.73M2 — SIGNIFICANT CHANGE UP
EOSINOPHIL # BLD AUTO: 0.16 K/UL — SIGNIFICANT CHANGE UP (ref 0–0.5)
EOSINOPHIL NFR BLD AUTO: 1.8 % — SIGNIFICANT CHANGE UP (ref 0–6)
GLUCOSE SERPL-MCNC: 95 MG/DL — SIGNIFICANT CHANGE UP (ref 70–99)
HCT VFR BLD CALC: 32.8 % — LOW (ref 34.5–45)
HGB BLD-MCNC: 11 G/DL — LOW (ref 11.5–15.5)
IANC: 6.17 K/UL — SIGNIFICANT CHANGE UP (ref 1.8–7.4)
IMM GRANULOCYTES NFR BLD AUTO: 1.1 % — HIGH (ref 0–0.9)
LYMPHOCYTES # BLD AUTO: 1.55 K/UL — SIGNIFICANT CHANGE UP (ref 1–3.3)
LYMPHOCYTES # BLD AUTO: 17.6 % — SIGNIFICANT CHANGE UP (ref 13–44)
MAGNESIUM SERPL-MCNC: 2.2 MG/DL — SIGNIFICANT CHANGE UP (ref 1.6–2.6)
MCHC RBC-ENTMCNC: 28.2 PG — SIGNIFICANT CHANGE UP (ref 27–34)
MCHC RBC-ENTMCNC: 33.5 GM/DL — SIGNIFICANT CHANGE UP (ref 32–36)
MCV RBC AUTO: 84.1 FL — SIGNIFICANT CHANGE UP (ref 80–100)
MONOCYTES # BLD AUTO: 0.77 K/UL — SIGNIFICANT CHANGE UP (ref 0–0.9)
MONOCYTES NFR BLD AUTO: 8.7 % — SIGNIFICANT CHANGE UP (ref 2–14)
NEUTROPHILS # BLD AUTO: 6.17 K/UL — SIGNIFICANT CHANGE UP (ref 1.8–7.4)
NEUTROPHILS NFR BLD AUTO: 70.1 % — SIGNIFICANT CHANGE UP (ref 43–77)
NRBC # BLD: 0 /100 WBCS — SIGNIFICANT CHANGE UP (ref 0–0)
NRBC # FLD: 0 K/UL — SIGNIFICANT CHANGE UP (ref 0–0)
PHOSPHATE SERPL-MCNC: 3.6 MG/DL — SIGNIFICANT CHANGE UP (ref 2.5–4.5)
PLATELET # BLD AUTO: 414 K/UL — HIGH (ref 150–400)
POTASSIUM SERPL-MCNC: 3.6 MMOL/L — SIGNIFICANT CHANGE UP (ref 3.5–5.3)
POTASSIUM SERPL-SCNC: 3.6 MMOL/L — SIGNIFICANT CHANGE UP (ref 3.5–5.3)
PROT SERPL-MCNC: 7 G/DL — SIGNIFICANT CHANGE UP (ref 6–8.3)
RBC # BLD: 3.9 M/UL — SIGNIFICANT CHANGE UP (ref 3.8–5.2)
RBC # FLD: 13.1 % — SIGNIFICANT CHANGE UP (ref 10.3–14.5)
RH IG SCN BLD-IMP: POSITIVE — SIGNIFICANT CHANGE UP
SODIUM SERPL-SCNC: 138 MMOL/L — SIGNIFICANT CHANGE UP (ref 135–145)
SPECIMEN SOURCE: SIGNIFICANT CHANGE UP
SURGICAL PATHOLOGY STUDY: SIGNIFICANT CHANGE UP
WBC # BLD: 8.81 K/UL — SIGNIFICANT CHANGE UP (ref 3.8–10.5)
WBC # FLD AUTO: 8.81 K/UL — SIGNIFICANT CHANGE UP (ref 3.8–10.5)

## 2024-06-04 PROCEDURE — 99232 SBSQ HOSP IP/OBS MODERATE 35: CPT

## 2024-06-04 RX ORDER — PIPERACILLIN AND TAZOBACTAM 4; .5 G/20ML; G/20ML
3.38 INJECTION, POWDER, LYOPHILIZED, FOR SOLUTION INTRAVENOUS ONCE
Refills: 0 | Status: DISCONTINUED | OUTPATIENT
Start: 2024-06-04 | End: 2024-06-04

## 2024-06-04 RX ORDER — DIPHENHYDRAMINE HCL 50 MG
25 CAPSULE ORAL EVERY 6 HOURS
Refills: 0 | Status: DISCONTINUED | OUTPATIENT
Start: 2024-06-04 | End: 2024-06-11

## 2024-06-04 RX ORDER — PIPERACILLIN AND TAZOBACTAM 4; .5 G/20ML; G/20ML
3.38 INJECTION, POWDER, LYOPHILIZED, FOR SOLUTION INTRAVENOUS EVERY 8 HOURS
Refills: 0 | Status: DISCONTINUED | OUTPATIENT
Start: 2024-06-04 | End: 2024-06-05

## 2024-06-04 RX ORDER — IBUPROFEN 200 MG
600 TABLET ORAL ONCE
Refills: 0 | Status: COMPLETED | OUTPATIENT
Start: 2024-06-04 | End: 2024-06-04

## 2024-06-04 RX ADMIN — MEROPENEM 100 MILLIGRAM(S): 1 INJECTION INTRAVENOUS at 09:33

## 2024-06-04 RX ADMIN — Medication 600 MILLIGRAM(S): at 22:21

## 2024-06-04 RX ADMIN — Medication 600 MILLIGRAM(S): at 18:41

## 2024-06-04 RX ADMIN — Medication 100 MILLIGRAM(S): at 05:32

## 2024-06-04 RX ADMIN — BENZOCAINE AND MENTHOL 1 LOZENGE: 5; 1 LIQUID ORAL at 01:03

## 2024-06-04 RX ADMIN — Medication 100 MILLIGRAM(S): at 22:21

## 2024-06-04 RX ADMIN — APIXABAN 2.5 MILLIGRAM(S): 2.5 TABLET, FILM COATED ORAL at 18:41

## 2024-06-04 RX ADMIN — Medication 25 MILLIGRAM(S): at 15:43

## 2024-06-04 RX ADMIN — MEROPENEM 100 MILLIGRAM(S): 1 INJECTION INTRAVENOUS at 01:02

## 2024-06-04 RX ADMIN — Medication 25 MILLIGRAM(S): at 00:12

## 2024-06-04 RX ADMIN — Medication 2 SPRAY(S): at 18:39

## 2024-06-04 RX ADMIN — Medication 600 MILLIGRAM(S): at 05:32

## 2024-06-04 RX ADMIN — APIXABAN 2.5 MILLIGRAM(S): 2.5 TABLET, FILM COATED ORAL at 05:32

## 2024-06-04 RX ADMIN — Medication 600 MILLIGRAM(S): at 17:50

## 2024-06-04 RX ADMIN — BENZOCAINE AND MENTHOL 1 LOZENGE: 5; 1 LIQUID ORAL at 18:42

## 2024-06-04 RX ADMIN — BENZOCAINE AND MENTHOL 1 LOZENGE: 5; 1 LIQUID ORAL at 09:39

## 2024-06-04 RX ADMIN — BENZOCAINE AND MENTHOL 1 LOZENGE: 5; 1 LIQUID ORAL at 22:21

## 2024-06-04 RX ADMIN — Medication 600 MILLIGRAM(S): at 06:32

## 2024-06-04 RX ADMIN — Medication 2 SPRAY(S): at 05:32

## 2024-06-04 RX ADMIN — Medication 600 MILLIGRAM(S): at 17:04

## 2024-06-04 RX ADMIN — BENZOCAINE AND MENTHOL 1 LOZENGE: 5; 1 LIQUID ORAL at 05:32

## 2024-06-04 RX ADMIN — Medication 100 MILLIGRAM(S): at 15:46

## 2024-06-04 RX ADMIN — Medication 600 MILLIGRAM(S): at 23:21

## 2024-06-04 RX ADMIN — PIPERACILLIN AND TAZOBACTAM 25 GRAM(S): 4; .5 INJECTION, POWDER, LYOPHILIZED, FOR SOLUTION INTRAVENOUS at 20:32

## 2024-06-04 NOTE — CHART NOTE - NSCHARTNOTEFT_GEN_A_CORE
Patient seen and examined at bedside, patient with 39.2F at 5p around time of eval. Patient with states that she is concerned about fevers in the setting of broadened antibiotic coverage. She continues to have yellow colored serous vaginal discharge. We informed patient that we would reach out to ID again and would continue with meropenem. Not having any abdominal pain, she is just having some mild irritation after her shower which is now resolving.     Vital Signs Last 24 Hours  T(C): 39.3 (06-04-24 @ 17:00), Max: 39.3 (06-04-24 @ 17:00)  HR: 97 (06-04-24 @ 15:15) (77 - 97)  BP: 113/50 (06-04-24 @ 15:15) (90/50 - 113/50)  RR: 18 (06-04-24 @ 15:15) (17 - 18)  SpO2: 100% (06-04-24 @ 15:15) (96% - 100%)    I&O's Summary    03 Jun 2024 07:01 - 04 Jun 2024 07:00  --------------------------------------------------------  IN: 1140 mL / OUT: 2000 mL / NET: -860 mL    04 Jun 2024 07:01  -  04 Jun 2024 17:22  --------------------------------------------------------  IN: 0 mL / OUT: 300 mL / NET: -300 mL        Physical Exam:  General: NAD  Lungs: breathing comfortably on RA, with cough   Abdomen: Soft, appropriately tender, non-distended  Incision: midline vertical incision c/d/i  : yellow serous discharge noted on pad  Ext: No pain or swelling    Labs:             11.0<L>  8.81  )-----------( 414<H>    ( 06-04 @ 05:30 )             32.8<L>               11.0<L>  9.64  )-----------( 402<H>    ( 06-03 @ 05:58 )             33.3<L>               11.3<L>  8.71  )-----------( 448<H>    ( 06-02 @ 18:11 )             35.0                11.0<L>  9.27  )-----------( 403<H>    ( 06-02 @ 05:38 )             32.5<L>               10.6<L>  10.59<H> )-----------( 381      ( 06-01 @ 05:46 )             31.6<L>        MEDICATIONS  (STANDING):  apixaban 2.5 milliGRAM(s) Oral every 12 hours  benzocaine/menthol Lozenge 1 Lozenge Oral every 4 hours  benzonatate 100 milliGRAM(s) Oral every 8 hours  famotidine    Tablet 20 milliGRAM(s) Oral daily  fluticasone propionate 50 MICROgram(s)/spray Nasal Spray 2 Spray(s) Both Nostrils two times a day  guaiFENesin  milliGRAM(s) Oral every 12 hours  ibuprofen  Tablet. 600 milliGRAM(s) Oral every 6 hours  ibuprofen  Tablet. 600 milliGRAM(s) Oral once  meropenem  IVPB 1000 milliGRAM(s) IV Intermittent every 8 hours  sodium chloride 0.65% Nasal 1 Spray(s) Both Nostrils every 3 hours    MEDICATIONS  (PRN):  diphenhydrAMINE 25 milliGRAM(s) Oral every 6 hours PRN cough  diphenhydrAMINE 25 milliGRAM(s) Oral every 6 hours PRN Rash and/or Itching  simethicone 80 milliGRAM(s) Chew every 6 hours PRN Gas    28 yo POD#13 s/p  Ex-Lap, TREVON, BSO, Pelvic and para-aortic LND, Omentectomy, Appendectomy, Frozen = Carcinoma. Patient was discharged on POD 4 with improved pain control and afebrile. At home she was in her usual state of health until 5/29 and presented to the ED on 5/30 in the setting of fevers of unknown origin. Patient recently post-op with URI symptoms. CTA neg for PE, but showing small pulmonary nodules. CT Abd/Pelvis reveals post-surgical changes with possible seroma in R adnexa. Patient febrile today at 5p 39.2.  Plan  -ID reconsulted  -will send AM Quant gold  -LE dopplers ordered  -will send vaginal fluid creatinine and vaginal culture.     Raquel PGY2  dw Dr. Holm

## 2024-06-04 NOTE — PROGRESS NOTE ADULT - SUBJECTIVE AND OBJECTIVE BOX
Follow Up:      Inverval History/ROS:Patient is a 29y old  Female who presents with a chief complaint of post op fevers (01 Jun 2024 04:50)      IR evaluated collection  felt high risk location     broadened to meropenem last night     afebrile so far today        dry cough+    Allergies    azithromycin (Hives)  Kiwi (Hives (Mild))    Intolerances        ANTIMICROBIALS:  meropenem  IVPB 1000 every 8 hours      MEDICATIONS  (STANDING):  apixaban 2.5 every 12 hours  benzonatate 100 every 8 hours  diphenhydrAMINE 25 every 6 hours PRN  diphenhydrAMINE 25 every 6 hours PRN  famotidine    Tablet 20 daily  guaiFENesin  every 12 hours  ibuprofen  Tablet. 600 every 6 hours  ibuprofen  Tablet. 600 once  simethicone 80 every 6 hours PRN      Vital Signs Last 24 Hrs  T(F): 99.3 (06-04-24 @ 15:15), Max: 100.7 (06-03-24 @ 16:54)  HR: 97 (06-04-24 @ 15:15)  BP: 113/50 (06-04-24 @ 15:15)  RR: 18 (06-04-24 @ 15:15)  SpO2: 100% (06-04-24 @ 15:15) (96% - 100%)      PHYSICAL EXAM:  General: non toxic  in chair   HEAD/EYES: [ ] PERRL [x ] white sclera [ ] icterus  ENT:  [ ] normal [x ] supple [ ] thrush [ ] pharyngeal exudate  Cardiovascular:   [ ] murmur [x ] normal [ ] PPM/AICD  Respiratory:  [x ] clear to ausculation bilaterally  GI:  [ x] soft, non-tender, normal bowel sounds  :  midline wound with steri strips    Musculoskeletal:  [ ] no synovitis  Neurologic:  [x ] non-focal exam   Skin:  faint rash trunk near wound   Psychiatric:  [x ] appropriate affect [x ] alert & oriented                            11.0   8.81  )-----------( 414      ( 04 Jun 2024 05:30 )             32.8 06-04    138  |  101  |  7   ----------------------------<  95  3.6   |  21  |  0.61  Ca    9.2      04 Jun 2024 05:30Phos  3.6     06-04Mg     2.20     06-04  TPro  7.0  /  Alb  3.6  /  TBili  0.3  /  DBili  x   /  AST  148  /  ALT  304  /  AlkPhos  356  06-04        Culture - Blood (06.02.24 @ 18:35)   Specimen Source: .Blood Blood  Culture Results:   No growth at 24 hours  Culture - Blood (06.02.24 @ 18:15)   Specimen Source: .Blood Blood  Culture Results:   No growth at 24 hours    Culture - Urine (05.30.24 @ 19:43)   Specimen Source: Clean Catch Clean Catch (Midstream)  Culture Results:   Culture grew 3 or more types of organisms which indicate   collection contamination; consider recollection only if clinically   indicated.    Culture - Blood (05.30.24 @ 17:26)   Specimen Source: .Blood Blood-Peripheral  Culture Results:   No growth       Culture - Blood (05.30.24 @ 17:15)   Specimen Source: .Blood Blood-Peripheral  Culture Results:   No growth at 72 Hours  RADIOLOGY:    r< from: CT Abdomen and Pelvis w/ IV Cont (06.02.24 @ 21:30) >    ACC: [id number] EXAM:  CT ABDOMEN AND PELVIS IC   ORDERED BY: ABEL UMANZOR     PROCEDURE DATE:  06/02/2024          INTERPRETATION:  CLINICAL INFORMATION: Fever of unknown origin and   vaginal discharge. Postop day 12 from ex-lap, TREVON/BSO, omentectomy,   appendectomy, and lymph node dissection.    COMPARISON: CT abdomen and pelvis 5/30/2024 and 5/6/2024. Abdominal   ultrasound 6/1/2024.    CONTRAST/COMPLICATIONS:  IV Contrast: Omnipaque 350  90 cc administered   10 cc discarded  Oral Contrast: NONE  Complications: None reported at time of study completion    PROCEDURE:  CT of the Abdomen and Pelvis was performed.  Sagittal and coronal reformats were performed.    FINDINGS:  LOWER CHEST: A 4 mm left lower lobe pulmonary nodule (2-5), unchanged.    LIVER: Normal morphology. A 1.1 cm hypoattenuating lesion in segment 8   (2-29), corresponding to hyperechoic lesion on prior ultrasound, possibly   hemangioma. Additional subcentimeter right hepatic lobe hypodensities too   small to characterize.  BILE DUCTS: Normal caliber.  GALLBLADDER: Contracted.  SPLEEN: Within normal limits.  PANCREAS: Within normal limits.  ADRENALS: Within normal limits.  KIDNEYS/URETERS: Within normal limits.    BLADDER: Small droplet of air within the urinary bladder, possibly   related to recent Salter catheter placement.  REPRODUCTIVE ORGANS: Hysterectomy. A 7.0 x 2.5 x 2.4 cm (AP x TV x CC)   rim-enhancing fluid collection with small locules of air in the   post-hysterectomy bed (2-116).    BOWEL: No bowel obstruction. Appendectomy.  PERITONEUM: Trace ascites, decreased. Mild smooth peritoneal thickening   along the left paracolic gutter.  VESSELS: Within normal limits.  RETROPERITONEUM/LYMPH NODES: Status post para-aortic lymph node   dissection. A 1.7 x 3.0 x 6.2 cm rim-enhancing fluid collection in the   retroperitoneal space (2-74 and 4-51). No lymphadenopathy.  ABDOMINAL WALL: Within normal limits.  BONES: Within normal limits.    IMPRESSION:  A 7.0 x 2.5 x 2.4 cm abscess in the post hysterectomy bed.    Redemonstrated rim-enhancing fluid collection in the aortocaval space,   consistent with postoperative fluid collection, possibly lymphocele,   seroma, or abscess.    Indeterminate right hepatic lesion, possibly hemangioma or metastasis   (given the history of malignancy). Further evaluation with   contrast-enhanced MRI abdomen is recommended.    Findings were discussed by Dr. Castellanos with Dr. Short on 6/3/2024 9:38   AM with read back confirmation.    --- End of Report ---          CHIP CASTELLANOS MD; Resident Radiologist  This document has been electronically signed.  NICHOLAS CADE MD; Attending Radiologist  This document has been electronically signed. Bao  3 2024 12:12PM    < end of copied text >

## 2024-06-04 NOTE — PROGRESS NOTE ADULT - ASSESSMENT
28 yo POD#13 s/p  Ex-Lap, TREVON, BSO, Pelvic and para-aortic LND, Omentectomy, Appendectomy, Frozen = Carcinoma. Patient was discharged on POD 4 with improved pain control and afebrile. At home she was in her usual state of health until 5/29 and presented to the ED on 5/30 in the setting of fevers of unknown origin. Patient recently post-op with URI symptoms. CTA neg for PE, but showing small pulmonary nodules. CT Abd/Pelvis reveals post-surgical changes with possible seroma in R adnexa. Patient febrile yesterday in the AM, but has remained afebrile after starting Meropenem IR reviewed case yesterday and states 2/2 difficulty in anatomical collection they recommend antibiotic tx and repeat imaging if fevers persist.     Neuro: PO ibuprofen PRN, holding tylenol in the setting of worsening transaminitis  CV: Hemodynamically stable   Pulm: O2 sat WNL on RA. Continue encouraging IS use  - throat lozenges for cough support  - CTA with no PE, but with multiple bilateral lower lobe sub-4 mm nodules, without significant interval change compared with the immediate prior CT chest 5/14/2024.  - CXR with clear lungs  - Pulm consulted, stating unlikely fevers 2/2 to pulmonary nodules, would defer bronchoscopy at this time, recommended trial cough suppression with benzonatate 100mg TID and flonase 2 sprays each nostril BID  GI: Tolerating regular diet  : Voiding spontaneously  - Patient with clear/yellow vaginal discharge, tampon test performed on 6/2 to evaluate for possible vesicovaginal fistula, negative. On VE by Dr. Lew fluctuance noted at the cuff concerning for possible collection  - f/u CTAP (6/2) A 7.0 x 2.5 x 2.4 cm abscess in the post hysterectomy bed. Re-demonstrated rim-enhancing fluid collection in the aortocaval space, consistent with postoperative fluid collection, possibly lymphocele, seroma, or abscess.  Heme: apixaban 2.5mg BID; SCDs while in bed  ID: appreciate ID recommendations  -Meropenem(6/3-)  - s/p zosyn (5/30 -6/3)  - Leukocytosis downtrending 17->12.51->10.59->(6/4)  - Procalcitonin 0.24, full viral panel neg   - Acute hepatitis panel, HIV, and MRSA swab neg  - UCx (5/30)-f: >3 specimen, previously growing with E faecalis   - continue to monitor fever curve, afebrile overnight  - GGT (6/1): 266->371  - CT Chest and AP(5/30): Mult b/l lower lub sub-4mm nodules similar to prior. No PE. No Pleural effusion. R hepatic lobe lesions 1.2cm, stable compared to prior. Hypoattenuating structure measuring 3.1 x 2.4 cm in the region of the ligated right ovarian vein, possibly seroma or lymphangioma.   - RUQ sono (6/1): Centimeter sized central RIGHT hyperechoic lesion, Contracted gallbladder without stones. Possible millimeter size gallbladder mural polyp.  Likely noncontributory to fevers  - LLE dopplers (6/1) neg  - ID recommendations: continue Zosyn, consider US liver, consider US pelvis, consider LE duplex to r/o DVT, serum GGT to r/o gallbladder disease, f/u Bcx and adjust abx therapy accordingly, f/u pulmonary nodules  - F/u BCx(5/30 and 6/2):NGTD  -Ucx(6/3)  - f/u ID recs  -IR Recs: Pt not a candidate for drainage 2/2 to poor window cw antibx monitoring VS.   FEN: SLIV, Replete electrolytes PRN     Dispo: continue inpatient management. Will f/u CTAP read and f/u ID recommendations    [place] PGY2   30 yo POD#13 s/p  Ex-Lap, TREVON, BSO, Pelvic and para-aortic LND, Omentectomy, Appendectomy, Frozen = Carcinoma. Patient was discharged on POD 4 with improved pain control and afebrile. At home she was in her usual state of health until 5/29 and presented to the ED on 5/30 in the setting of fevers of unknown origin. Patient recently post-op with URI symptoms. CTA neg for PE, but showing small pulmonary nodules. CT Abd/Pelvis reveals post-surgical changes with possible seroma in R adnexa. Patient febrile yesterday in the AM, but has remained afebrile after starting Meropenem IR reviewed case yesterday and states 2/2 difficulty in anatomical collection they recommend antibiotic tx and repeat imaging if fevers persist.     Neuro: PO ibuprofen PRN, holding tylenol in the setting of transaminitis  CV: Hemodynamically stable   Pulm: O2 sat WNL on RA. Continue encouraging IS use  - throat lozenges for cough support  - CTA with no PE, but with multiple bilateral lower lobe sub-4 mm nodules, without significant interval change compared with the immediate prior CT chest 5/14/2024.  - CXR with clear lungs  - Pulm consulted, stating unlikely fevers 2/2 to pulmonary nodules, would defer bronchoscopy at this time, recommended trial cough suppression with benzonatate 100mg TID and flonase 2 sprays each nostril BID  GI: Tolerating regular diet  : Voiding spontaneously  - Patient with clear/yellow vaginal discharge, tampon test performed on 6/2 to evaluate for possible vesicovaginal fistula, negative. On VE by Dr. Lew fluctuance noted at the cuff concerning for possible collection  - f/u CTAP (6/2) A 7.0 x 2.5 x 2.4 cm abscess in the post hysterectomy bed. Re-demonstrated rim-enhancing fluid collection in the aortocaval space, consistent with postoperative fluid collection, possibly lymphocele, seroma, or abscess.  Heme: apixaban 2.5mg BID; SCDs while in bed  ID: appreciate ID recommendations  - Meropenem(6/3-)  - s/p zosyn (5/30 -6/3)  - Leukocytosis downtrending 17->12.51->10.59->(6/4)  - Procalcitonin 0.24, full viral panel neg   - Acute hepatitis panel, HIV, and MRSA swab neg  - UCx (5/30)-f: >3 specimen, previously growing with E faecalis   - continue to monitor fever curve, afebrile overnight  - GGT (6/1): 266->371  - CT Chest and AP(5/30): Mult b/l lower lub sub-4mm nodules similar to prior. No PE. No Pleural effusion. R hepatic lobe lesions 1.2cm, stable compared to prior. Hypoattenuating structure measuring 3.1 x 2.4 cm in the region of the ligated right ovarian vein, possibly seroma or lymphangioma.   - RUQ sono (6/1): Centimeter sized central RIGHT hyperechoic lesion, Contracted gallbladder without stones. Possible millimeter size gallbladder mural polyp.  Likely noncontributory to fevers  - LLE dopplers (6/1) neg  - ID recommendations: OK with broadening to meropenam, consider US liver, consider US pelvis, consider LE duplex to r/o DVT, serum GGT to r/o gallbladder disease, f/u Bcx and adjust abx therapy accordingly, f/u pulmonary nodules  - F/u BCx(5/30 and 6/2):NGTD  - Ucx(6/3)  - IR Recs: Pt not a candidate for drainage 2/2 to poor window cw antibx, monitoring VS.   FEN: SLIV, Replete electrolytes PRN     Dispo: continue inpatient management.     Raquel PGY2

## 2024-06-04 NOTE — PROGRESS NOTE ADULT - ASSESSMENT
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 Denies dysuria      broadened to meropenem; afebrile so far.       would continue meropenem 1 gm iv q 8 h

## 2024-06-04 NOTE — PROGRESS NOTE ADULT - ATTENDING COMMENTS
I have examined the patient and discussed the treatment plan with the patient as well as fellows and residents involved in the ongoing care.  We have reviewed pertinent clinical findings and I agree with the findings and plan detailed above with the following addendum.    POD#14  Patient well appearing  Being treated for fevers, suspected intraabdominal infection  Switched to Meropenum yesterday   WBC downtrending   Continue to monitor fever curve    Conchis Montgomery MD, FACOG  Gynecologic Oncology

## 2024-06-04 NOTE — PROGRESS NOTE ADULT - SUBJECTIVE AND OBJECTIVE BOX
Subjective:   Pt seen and examined at bedside. No events overnight. She states that her cough is better but increase in cough suppressant has upset some stomach. Having less serous vaginal discharge. Denies subjective fevers overnight.     Objective:    MEDICATIONS  (STANDING):  apixaban 2.5 milliGRAM(s) Oral every 12 hours  benzocaine/menthol Lozenge 1 Lozenge Oral every 4 hours  benzonatate 100 milliGRAM(s) Oral every 8 hours  famotidine    Tablet 20 milliGRAM(s) Oral daily  fluticasone propionate 50 MICROgram(s)/spray Nasal Spray 2 Spray(s) Both Nostrils two times a day  guaiFENesin  milliGRAM(s) Oral every 12 hours  ibuprofen  Tablet. 600 milliGRAM(s) Oral every 6 hours  meropenem  IVPB 1000 milliGRAM(s) IV Intermittent every 8 hours  sodium chloride 0.65% Nasal 1 Spray(s) Both Nostrils every 3 hours    MEDICATIONS  (PRN):  diphenhydrAMINE 25 milliGRAM(s) Oral every 6 hours PRN cough  simethicone 80 milliGRAM(s) Chew every 6 hours PRN Gas      T(F): 98.6 (06-04-24 @ 05:28), Max: 100.7 (06-03-24 @ 16:54)  HR: 77 (06-04-24 @ 05:28) (77 - 100)  BP: 90/50 (06-04-24 @ 05:28) (90/50 - 104/67)  RR: 17 (06-04-24 @ 05:28) (17 - 18)  SpO2: 99% (06-04-24 @ 05:28) (96% - 100%)  Wt(kg): --    Physical Exam:  General: NAD  CV: RRR  Lungs: CTA b/l  Abdomen: Soft, non-tender diffusely, non distended, no rebound, guarding, rigidity.   : minimal serous discharge.   Incision: Midline vertical incision with dermabond prineo in place   Ext: No pain or edema, SCD's in place      LABS:  06-03    138    |  102    |  7      ----------------------------<  100<H>  3.8     |  22     |  0.72     Ca    9.2        03 Jun 2024 05:58  Phos  3.1       06-03  Mg     2.20      06-03    TPro  7.4    /  Alb  3.9    /  TBili  0.6    /  DBili  <0.2   /  AST  186<H>  /  ALT  353<H>  /  AlkPhos  352<H>  06-03          Urinalysis Basic - ( 03 Jun 2024 05:58 )    Color: x / Appearance: x / SG: x / pH: x  Gluc: 100 mg/dL / Ketone: x  / Bili: x / Urobili: x   Blood: x / Protein: x / Nitrite: x   Leuk Esterase: x / RBC: x / WBC x   Sq Epi: x / Non Sq Epi: x / Bacteria: x      CAPILLARY BLOOD GLUCOSE          I&O's Summary    02 Jun 2024 07:01  -  03 Jun 2024 07:00  --------------------------------------------------------  IN: 700 mL / OUT: 1700 mL / NET: -1000 mL    03 Jun 2024 07:01  -  04 Jun 2024 06:54  --------------------------------------------------------  IN: 1140 mL / OUT: 2000 mL / NET: -860 mL

## 2024-06-05 LAB
ALBUMIN SERPL ELPH-MCNC: 3.4 G/DL — SIGNIFICANT CHANGE UP (ref 3.3–5)
ALP SERPL-CCNC: 347 U/L — HIGH (ref 40–120)
ALT FLD-CCNC: 179 U/L — HIGH (ref 4–33)
ANION GAP SERPL CALC-SCNC: 14 MMOL/L — SIGNIFICANT CHANGE UP (ref 7–14)
ANION GAP SERPL CALC-SCNC: 15 MMOL/L — HIGH (ref 7–14)
AST SERPL-CCNC: 77 U/L — HIGH (ref 4–32)
BASOPHILS # BLD AUTO: 0.03 K/UL — SIGNIFICANT CHANGE UP (ref 0–0.2)
BASOPHILS NFR BLD AUTO: 0.2 % — SIGNIFICANT CHANGE UP (ref 0–2)
BILIRUB SERPL-MCNC: 0.5 MG/DL — SIGNIFICANT CHANGE UP (ref 0.2–1.2)
BUN SERPL-MCNC: 8 MG/DL — SIGNIFICANT CHANGE UP (ref 7–23)
BUN SERPL-MCNC: 9 MG/DL — SIGNIFICANT CHANGE UP (ref 7–23)
CALCIUM SERPL-MCNC: 8.9 MG/DL — SIGNIFICANT CHANGE UP (ref 8.4–10.5)
CALCIUM SERPL-MCNC: 8.9 MG/DL — SIGNIFICANT CHANGE UP (ref 8.4–10.5)
CHLORIDE SERPL-SCNC: 102 MMOL/L — SIGNIFICANT CHANGE UP (ref 98–107)
CHLORIDE SERPL-SCNC: 99 MMOL/L — SIGNIFICANT CHANGE UP (ref 98–107)
CO2 SERPL-SCNC: 21 MMOL/L — LOW (ref 22–31)
CO2 SERPL-SCNC: 22 MMOL/L — SIGNIFICANT CHANGE UP (ref 22–31)
CREAT SERPL-MCNC: 0.61 MG/DL — SIGNIFICANT CHANGE UP (ref 0.5–1.3)
CREAT SERPL-MCNC: 0.63 MG/DL — SIGNIFICANT CHANGE UP (ref 0.5–1.3)
CRYPTOC AG FLD QL: NEGATIVE — SIGNIFICANT CHANGE UP
EGFR: 122 ML/MIN/1.73M2 — SIGNIFICANT CHANGE UP
EGFR: 122 ML/MIN/1.73M2 — SIGNIFICANT CHANGE UP
EOSINOPHIL # BLD AUTO: 0.34 K/UL — SIGNIFICANT CHANGE UP (ref 0–0.5)
EOSINOPHIL NFR BLD AUTO: 2.4 % — SIGNIFICANT CHANGE UP (ref 0–6)
FLUAV AG NPH QL: SIGNIFICANT CHANGE UP
FLUBV AG NPH QL: SIGNIFICANT CHANGE UP
GLUCOSE SERPL-MCNC: 112 MG/DL — HIGH (ref 70–99)
GLUCOSE SERPL-MCNC: 93 MG/DL — SIGNIFICANT CHANGE UP (ref 70–99)
HCT VFR BLD CALC: 32.2 % — LOW (ref 34.5–45)
HCT VFR BLD CALC: 33 % — LOW (ref 34.5–45)
HGB BLD-MCNC: 10.4 G/DL — LOW (ref 11.5–15.5)
HGB BLD-MCNC: 10.9 G/DL — LOW (ref 11.5–15.5)
IANC: 11.66 K/UL — HIGH (ref 1.8–7.4)
IMM GRANULOCYTES NFR BLD AUTO: 0.6 % — SIGNIFICANT CHANGE UP (ref 0–0.9)
LACTATE SERPL-SCNC: 1.5 MMOL/L — SIGNIFICANT CHANGE UP (ref 0.5–2)
LDH SERPL L TO P-CCNC: 201 U/L — SIGNIFICANT CHANGE UP (ref 135–225)
LYMPHOCYTES # BLD AUTO: 1.53 K/UL — SIGNIFICANT CHANGE UP (ref 1–3.3)
LYMPHOCYTES # BLD AUTO: 10.8 % — LOW (ref 13–44)
MAGNESIUM SERPL-MCNC: 2.2 MG/DL — SIGNIFICANT CHANGE UP (ref 1.6–2.6)
MCHC RBC-ENTMCNC: 27.4 PG — SIGNIFICANT CHANGE UP (ref 27–34)
MCHC RBC-ENTMCNC: 27.8 PG — SIGNIFICANT CHANGE UP (ref 27–34)
MCHC RBC-ENTMCNC: 32.3 GM/DL — SIGNIFICANT CHANGE UP (ref 32–36)
MCHC RBC-ENTMCNC: 33 GM/DL — SIGNIFICANT CHANGE UP (ref 32–36)
MCV RBC AUTO: 84.2 FL — SIGNIFICANT CHANGE UP (ref 80–100)
MCV RBC AUTO: 84.7 FL — SIGNIFICANT CHANGE UP (ref 80–100)
MONOCYTES # BLD AUTO: 0.52 K/UL — SIGNIFICANT CHANGE UP (ref 0–0.9)
MONOCYTES NFR BLD AUTO: 3.7 % — SIGNIFICANT CHANGE UP (ref 2–14)
NEUTROPHILS # BLD AUTO: 11.66 K/UL — HIGH (ref 1.8–7.4)
NEUTROPHILS NFR BLD AUTO: 82.3 % — HIGH (ref 43–77)
NRBC # BLD: 0 /100 WBCS — SIGNIFICANT CHANGE UP (ref 0–0)
NRBC # BLD: 0 /100 WBCS — SIGNIFICANT CHANGE UP (ref 0–0)
NRBC # FLD: 0 K/UL — SIGNIFICANT CHANGE UP (ref 0–0)
NRBC # FLD: 0 K/UL — SIGNIFICANT CHANGE UP (ref 0–0)
PHOSPHATE SERPL-MCNC: 3.7 MG/DL — SIGNIFICANT CHANGE UP (ref 2.5–4.5)
PLATELET # BLD AUTO: 432 K/UL — HIGH (ref 150–400)
PLATELET # BLD AUTO: 491 K/UL — HIGH (ref 150–400)
POTASSIUM SERPL-MCNC: 3.6 MMOL/L — SIGNIFICANT CHANGE UP (ref 3.5–5.3)
POTASSIUM SERPL-MCNC: 3.9 MMOL/L — SIGNIFICANT CHANGE UP (ref 3.5–5.3)
POTASSIUM SERPL-SCNC: 3.6 MMOL/L — SIGNIFICANT CHANGE UP (ref 3.5–5.3)
POTASSIUM SERPL-SCNC: 3.9 MMOL/L — SIGNIFICANT CHANGE UP (ref 3.5–5.3)
PROT SERPL-MCNC: 6.9 G/DL — SIGNIFICANT CHANGE UP (ref 6–8.3)
RBC # BLD: 3.8 M/UL — SIGNIFICANT CHANGE UP (ref 3.8–5.2)
RBC # BLD: 3.92 M/UL — SIGNIFICANT CHANGE UP (ref 3.8–5.2)
RBC # FLD: 12.9 % — SIGNIFICANT CHANGE UP (ref 10.3–14.5)
RBC # FLD: 13.1 % — SIGNIFICANT CHANGE UP (ref 10.3–14.5)
RSV RNA NPH QL NAA+NON-PROBE: SIGNIFICANT CHANGE UP
SARS-COV-2 RNA SPEC QL NAA+PROBE: SIGNIFICANT CHANGE UP
SODIUM SERPL-SCNC: 135 MMOL/L — SIGNIFICANT CHANGE UP (ref 135–145)
SODIUM SERPL-SCNC: 138 MMOL/L — SIGNIFICANT CHANGE UP (ref 135–145)
WBC # BLD: 10.77 K/UL — HIGH (ref 3.8–10.5)
WBC # BLD: 14.16 K/UL — HIGH (ref 3.8–10.5)
WBC # FLD AUTO: 10.77 K/UL — HIGH (ref 3.8–10.5)
WBC # FLD AUTO: 14.16 K/UL — HIGH (ref 3.8–10.5)

## 2024-06-05 PROCEDURE — 74177 CT ABD & PELVIS W/CONTRAST: CPT | Mod: 26

## 2024-06-05 PROCEDURE — 71275 CT ANGIOGRAPHY CHEST: CPT | Mod: 26

## 2024-06-05 PROCEDURE — 99233 SBSQ HOSP IP/OBS HIGH 50: CPT | Mod: GC

## 2024-06-05 PROCEDURE — 99232 SBSQ HOSP IP/OBS MODERATE 35: CPT

## 2024-06-05 RX ORDER — METRONIDAZOLE 500 MG
500 TABLET ORAL EVERY 8 HOURS
Refills: 0 | Status: DISCONTINUED | OUTPATIENT
Start: 2024-06-05 | End: 2024-06-09

## 2024-06-05 RX ORDER — IPRATROPIUM/ALBUTEROL SULFATE 18-103MCG
3 AEROSOL WITH ADAPTER (GRAM) INHALATION EVERY 6 HOURS
Refills: 0 | Status: DISCONTINUED | OUTPATIENT
Start: 2024-06-05 | End: 2024-06-11

## 2024-06-05 RX ORDER — METRONIDAZOLE 500 MG
TABLET ORAL
Refills: 0 | Status: DISCONTINUED | OUTPATIENT
Start: 2024-06-05 | End: 2024-06-09

## 2024-06-05 RX ORDER — METRONIDAZOLE 500 MG
500 TABLET ORAL ONCE
Refills: 0 | Status: COMPLETED | OUTPATIENT
Start: 2024-06-05 | End: 2024-06-05

## 2024-06-05 RX ORDER — ALBUTEROL 90 UG/1
2 AEROSOL, METERED ORAL EVERY 6 HOURS
Refills: 0 | Status: DISCONTINUED | OUTPATIENT
Start: 2024-06-05 | End: 2024-06-11

## 2024-06-05 RX ORDER — BUDESONIDE, MICRONIZED 100 %
0.5 POWDER (GRAM) MISCELLANEOUS EVERY 12 HOURS
Refills: 0 | Status: DISCONTINUED | OUTPATIENT
Start: 2024-06-05 | End: 2024-06-07

## 2024-06-05 RX ORDER — CIPROFLOXACIN LACTATE 400MG/40ML
400 VIAL (ML) INTRAVENOUS EVERY 12 HOURS
Refills: 0 | Status: DISCONTINUED | OUTPATIENT
Start: 2024-06-05 | End: 2024-06-09

## 2024-06-05 RX ORDER — SODIUM CHLORIDE 9 MG/ML
1000 INJECTION, SOLUTION INTRAVENOUS ONCE
Refills: 0 | Status: COMPLETED | OUTPATIENT
Start: 2024-06-05 | End: 2024-06-05

## 2024-06-05 RX ORDER — PANTOPRAZOLE SODIUM 20 MG/1
40 TABLET, DELAYED RELEASE ORAL
Refills: 0 | Status: DISCONTINUED | OUTPATIENT
Start: 2024-06-05 | End: 2024-06-11

## 2024-06-05 RX ADMIN — PIPERACILLIN AND TAZOBACTAM 25 GRAM(S): 4; .5 INJECTION, POWDER, LYOPHILIZED, FOR SOLUTION INTRAVENOUS at 12:24

## 2024-06-05 RX ADMIN — SODIUM CHLORIDE 2000 MILLILITER(S): 9 INJECTION, SOLUTION INTRAVENOUS at 12:57

## 2024-06-05 RX ADMIN — Medication 2 SPRAY(S): at 19:35

## 2024-06-05 RX ADMIN — PIPERACILLIN AND TAZOBACTAM 25 GRAM(S): 4; .5 INJECTION, POWDER, LYOPHILIZED, FOR SOLUTION INTRAVENOUS at 04:09

## 2024-06-05 RX ADMIN — Medication 1 APPLICATION(S): at 20:46

## 2024-06-05 RX ADMIN — Medication 25 MILLIGRAM(S): at 23:39

## 2024-06-05 RX ADMIN — Medication 600 MILLIGRAM(S): at 05:12

## 2024-06-05 RX ADMIN — APIXABAN 2.5 MILLIGRAM(S): 2.5 TABLET, FILM COATED ORAL at 19:35

## 2024-06-05 RX ADMIN — BENZOCAINE AND MENTHOL 1 LOZENGE: 5; 1 LIQUID ORAL at 22:03

## 2024-06-05 RX ADMIN — Medication 100 MILLIGRAM(S): at 22:03

## 2024-06-05 RX ADMIN — BENZOCAINE AND MENTHOL 1 LOZENGE: 5; 1 LIQUID ORAL at 19:35

## 2024-06-05 RX ADMIN — Medication 600 MILLIGRAM(S): at 19:34

## 2024-06-05 RX ADMIN — Medication 100 MILLIGRAM(S): at 22:02

## 2024-06-05 RX ADMIN — Medication 600 MILLIGRAM(S): at 12:50

## 2024-06-05 RX ADMIN — Medication 200 MILLIGRAM(S): at 19:34

## 2024-06-05 RX ADMIN — Medication 600 MILLIGRAM(S): at 20:36

## 2024-06-05 RX ADMIN — BENZOCAINE AND MENTHOL 1 LOZENGE: 5; 1 LIQUID ORAL at 12:18

## 2024-06-05 RX ADMIN — Medication 600 MILLIGRAM(S): at 12:19

## 2024-06-05 RX ADMIN — Medication 600 MILLIGRAM(S): at 19:36

## 2024-06-05 RX ADMIN — Medication 3 MILLILITER(S): at 22:47

## 2024-06-05 RX ADMIN — BENZOCAINE AND MENTHOL 1 LOZENGE: 5; 1 LIQUID ORAL at 05:13

## 2024-06-05 RX ADMIN — Medication 25 MILLIGRAM(S): at 06:06

## 2024-06-05 RX ADMIN — Medication 2 SPRAY(S): at 05:12

## 2024-06-05 RX ADMIN — APIXABAN 2.5 MILLIGRAM(S): 2.5 TABLET, FILM COATED ORAL at 05:12

## 2024-06-05 RX ADMIN — Medication 100 MILLIGRAM(S): at 13:43

## 2024-06-05 RX ADMIN — Medication 100 MILLIGRAM(S): at 05:12

## 2024-06-05 RX ADMIN — Medication 100 MILLIGRAM(S): at 13:55

## 2024-06-05 NOTE — PROVIDER CONTACT NOTE (OTHER) - SITUATION
rash present has now worsened over shift. did not spread further on legs but spread more up back. number of dots increased and now more red.

## 2024-06-05 NOTE — PROGRESS NOTE ADULT - SUBJECTIVE AND OBJECTIVE BOX
Follow Up:      Inverval History/ROS:Patient is a 29y old  Female who presents with a chief complaint of post op fevers (01 Jun 2024 04:50)    fever 102 last night  now body rash   itchy    switched back to zosyn      Allergies    azithromycin (Hives)  Kiwi (Hives (Mild))    Intolerances        ANTIMICROBIALS:  ciprofloxacin   IVPB 400 every 12 hours  metroNIDAZOLE  IVPB    metroNIDAZOLE  IVPB 500 once  metroNIDAZOLE  IVPB 500 every 8 hours    MEDICATIONS  (STANDING):  apixaban 2.5 every 12 hours  benzonatate 100 every 8 hours  diphenhydrAMINE 25 every 6 hours PRN  diphenhydrAMINE 25 every 6 hours PRN  famotidine    Tablet 20 daily  guaiFENesin  every 12 hours  ibuprofen  Tablet. 600 every 6 hours  simethicone 80 every 6 hours PRN      Vital Signs Last 24 Hrs  T(F): 101.3 (06-05-24 @ 12:00), Max: 102.7 (06-04-24 @ 17:00)  HR: 109 (06-05-24 @ 12:00)  BP: 103/61 (06-05-24 @ 12:00)  RR: 18 (06-05-24 @ 12:00)  SpO2: 98% (06-05-24 @ 12:00) (98% - 100%)    PHYSICAL EXAM:  General: uncomfortable appearing due to fever    in chair   HEAD/EYES:  white sclera  face flushed   Cardiovascular: tachy  Respiratory:   clear   GI:   soft  :  midline wound   Neurologic: non-focal exam   Skin:  macular papular rash abdomen, back, antecubital fossa, thighs  Psychiatric:  [x ] appropriate affect [x ] alert & oriented  IV right arm                            10.9   14.16 )-----------( 491      ( 05 Jun 2024 13:02 )             33.0 06-05    138  |  102  |  9   ----------------------------<  93  3.6   |  22  |  0.63  Ca    8.9      05 Jun 2024 05:30Phos  3.7     06-05Mg     2.20     06-05  TPro  7.0  /  Alb  3.6  /  TBili  0.3  /  DBili  x   /  AST  148  /  ALT  304  /  AlkPhos  356  06-04        Culture - Blood (06.02.24 @ 18:35)   Specimen Source: .Blood Blood  Culture Results:   No growth at 48 hours  Culture - Blood (06.02.24 @ 18:15)   Specimen Source: .Blood Blood  Culture Results:   No growth at 48 hours    Culture - Urine (05.30.24 @ 19:43)   Specimen Source: Clean Catch Clean Catch (Midstream)  Culture Results:   Culture grew 3 or more types of organisms which indicate   collection contamination; consider recollection only if clinically   indicated.    Culture - Blood (05.30.24 @ 17:26)   Specimen Source: .Blood Blood-Peripheral  Culture Results:   No growth       Culture - Blood (05.30.24 @ 17:15)   Specimen Source: .Blood Blood-Peripheral  Culture Results:   No growth   RADIOLOGY:    r< from: CT Abdomen and Pelvis w/ IV Cont (06.02.24 @ 21:30) >    ACC: [id number] EXAM:  CT ABDOMEN AND PELVIS IC   ORDERED BY: ABEL UMANZOR     PROCEDURE DATE:  06/02/2024          INTERPRETATION:  CLINICAL INFORMATION: Fever of unknown origin and   vaginal discharge. Postop day 12 from ex-lap, TREVON/BSO, omentectomy,   appendectomy, and lymph node dissection.    COMPARISON: CT abdomen and pelvis 5/30/2024 and 5/6/2024. Abdominal   ultrasound 6/1/2024.    CONTRAST/COMPLICATIONS:  IV Contrast: Omnipaque 350  90 cc administered   10 cc discarded  Oral Contrast: NONE  Complications: None reported at time of study completion    PROCEDURE:  CT of the Abdomen and Pelvis was performed.  Sagittal and coronal reformats were performed.    FINDINGS:  LOWER CHEST: A 4 mm left lower lobe pulmonary nodule (2-5), unchanged.    LIVER: Normal morphology. A 1.1 cm hypoattenuating lesion in segment 8   (2-29), corresponding to hyperechoic lesion on prior ultrasound, possibly   hemangioma. Additional subcentimeter right hepatic lobe hypodensities too   small to characterize.  BILE DUCTS: Normal caliber.  GALLBLADDER: Contracted.  SPLEEN: Within normal limits.  PANCREAS: Within normal limits.  ADRENALS: Within normal limits.  KIDNEYS/URETERS: Within normal limits.    BLADDER: Small droplet of air within the urinary bladder, possibly   related to recent Salter catheter placement.  REPRODUCTIVE ORGANS: Hysterectomy. A 7.0 x 2.5 x 2.4 cm (AP x TV x CC)   rim-enhancing fluid collection with small locules of air in the   post-hysterectomy bed (2-116).    BOWEL: No bowel obstruction. Appendectomy.  PERITONEUM: Trace ascites, decreased. Mild smooth peritoneal thickening   along the left paracolic gutter.  VESSELS: Within normal limits.  RETROPERITONEUM/LYMPH NODES: Status post para-aortic lymph node   dissection. A 1.7 x 3.0 x 6.2 cm rim-enhancing fluid collection in the   retroperitoneal space (2-74 and 4-51). No lymphadenopathy.  ABDOMINAL WALL: Within normal limits.  BONES: Within normal limits.    IMPRESSION:  A 7.0 x 2.5 x 2.4 cm abscess in the post hysterectomy bed.    Redemonstrated rim-enhancing fluid collection in the aortocaval space,   consistent with postoperative fluid collection, possibly lymphocele,   seroma, or abscess.    Indeterminate right hepatic lesion, possibly hemangioma or metastasis   (given the history of malignancy). Further evaluation with   contrast-enhanced MRI abdomen is recommended.    Findings were discussed by Dr. Castellanos with Dr. Short on 6/3/2024 9:38   AM with read back confirmation.    --- End of Report ---          CHIP CASTELLANOS MD; Resident Radiologist  This document has been electronically signed.  NICHOLAS CADE MD; Attending Radiologist  This document has been electronically signed. Bao  3 2024 12:12PM    < end of copied text >

## 2024-06-05 NOTE — PROGRESS NOTE ADULT - SUBJECTIVE AND OBJECTIVE BOX
R1 Gyn ONC Progress Note POD#15  HD#7    Subjective:   Pt seen and examined at bedside. Pain well controlled. Patient ambulating. Passing flatus. Tolerating regular diet however endorses decreased appetite. Pt complaining of skin rash and ithiness that started yesterday, initially on her belly and spreading to her inner thighs. This morning, she expresses that she has noticed the rash on her back. She continues to have a dry cough and it hasn't gotten better or worse. Pt denies fever, chills, chest pain, SOB, nausea, vomiting, lightheadedness, dizziness.      Objective:  T(F): 97.2 (06-05-24 @ 05:10), Max: 102.7 (06-04-24 @ 17:00)  HR: 80 (06-05-24 @ 05:10) (69 - 102)  BP: 93/60 (06-05-24 @ 05:10) (93/60 - 117/63)  RR: 18 (06-05-24 @ 05:10) (17 - 18)  SpO2: 100% (06-05-24 @ 05:10) (99% - 100%)  Wt(kg): --  I&O's Summary    04 Jun 2024 07:01  -  05 Jun 2024 07:00  --------------------------------------------------------  IN: 560 mL / OUT: 1400 mL / NET: -840 mL      CAPILLARY BLOOD GLUCOSE          MEDICATIONS  (STANDING):  apixaban 2.5 milliGRAM(s) Oral every 12 hours  benzocaine/menthol Lozenge 1 Lozenge Oral every 4 hours  benzonatate 100 milliGRAM(s) Oral every 8 hours  famotidine    Tablet 20 milliGRAM(s) Oral daily  fluticasone propionate 50 MICROgram(s)/spray Nasal Spray 2 Spray(s) Both Nostrils two times a day  guaiFENesin  milliGRAM(s) Oral every 12 hours  ibuprofen  Tablet. 600 milliGRAM(s) Oral every 6 hours  piperacillin/tazobactam IVPB.. 3.375 Gram(s) IV Intermittent every 8 hours  sodium chloride 0.65% Nasal 1 Spray(s) Both Nostrils every 3 hours    MEDICATIONS  (PRN):  diphenhydrAMINE 25 milliGRAM(s) Oral every 6 hours PRN cough  diphenhydrAMINE 25 milliGRAM(s) Oral every 6 hours PRN Rash and/or Itching  simethicone 80 milliGRAM(s) Chew every 6 hours PRN Gas      Physical Exam:  Constitutional: NAD, A+O x3  CV: RR S1S2 no m/r/g  Lungs: CTA b/l, good air flow b/l  Abdomen: soft, softly-distended, mildly tender. No guarding, no rebound, normal bowel sounds  Skin: Maculopapular, moderately pruritic rash on her abdomen, groin and back area  Incision: clean, dry, intact  Extremities: no lower extremity edema or calf tenderness bilaterally    LABS:               10.4   10.77 )-----------( 432      ( 06-05 @ 05:30 )             32.2                11.0   8.81  )-----------( 414      ( 06-04 @ 05:30 )             32.8                11.0   9.64  )-----------( 402      ( 06-03 @ 05:58 )             33.3                11.3   8.71  )-----------( 448      ( 06-02 @ 18:11 )             35.0       06-05    138    |  102    |  9      ----------------------------<  93     3.6     |  22     |  0.63   06-04    138    |  101    |  7      ----------------------------<  95     3.6     |  21<L>  |  0.61     Ca    8.9        05 Jun 2024 05:30  Ca    9.2        04 Jun 2024 05:30  Phos  3.7       06-05  Phos  3.6       06-04  Mg     2.20      06-05  Mg     2.20      06-04    TPro  7.0    /  Alb  3.6    /  TBili  0.3    /  DBili  x      /  AST  148<H>  /  ALT  304<H>  /  AlkPhos  356<H>  06-04          Urinalysis Basic - ( 05 Jun 2024 05:30 )    Color: x / Appearance: x / SG: x / pH: x  Gluc: 93 mg/dL / Ketone: x  / Bili: x / Urobili: x   Blood: x / Protein: x / Nitrite: x   Leuk Esterase: x / RBC: x / WBC x   Sq Epi: x / Non Sq Epi: x / Bacteria: x                   R1 Gyn ONC Progress Note POD#15  HD#7    Subjective:   Pt seen and examined at bedside. Pain well controlled. Patient ambulating. Passing flatus. Tolerating regular diet however endorses decreased appetite. Pt complaining of skin rash and ithiness that started yesterday, initially on her belly and spreading to her inner thighs. This morning, she expresses that she has noticed the rash on her back. Pt reports that itchiness improved with benadryl but rash continues to persist. She continues to have a dry cough and it hasn't gotten better or worse. Pt denies fever, chills, chest pain, SOB, nausea, vomiting, lightheadedness, dizziness.      Objective:  T(F): 97.2 (06-05-24 @ 05:10), Max: 102.7 (06-04-24 @ 17:00)  HR: 80 (06-05-24 @ 05:10) (69 - 102)  BP: 93/60 (06-05-24 @ 05:10) (93/60 - 117/63)  RR: 18 (06-05-24 @ 05:10) (17 - 18)  SpO2: 100% (06-05-24 @ 05:10) (99% - 100%)  Wt(kg): --  I&O's Summary    04 Jun 2024 07:01  -  05 Jun 2024 07:00  --------------------------------------------------------  IN: 560 mL / OUT: 1400 mL / NET: -840 mL      CAPILLARY BLOOD GLUCOSE          MEDICATIONS  (STANDING):  apixaban 2.5 milliGRAM(s) Oral every 12 hours  benzocaine/menthol Lozenge 1 Lozenge Oral every 4 hours  benzonatate 100 milliGRAM(s) Oral every 8 hours  famotidine    Tablet 20 milliGRAM(s) Oral daily  fluticasone propionate 50 MICROgram(s)/spray Nasal Spray 2 Spray(s) Both Nostrils two times a day  guaiFENesin  milliGRAM(s) Oral every 12 hours  ibuprofen  Tablet. 600 milliGRAM(s) Oral every 6 hours  piperacillin/tazobactam IVPB.. 3.375 Gram(s) IV Intermittent every 8 hours  sodium chloride 0.65% Nasal 1 Spray(s) Both Nostrils every 3 hours    MEDICATIONS  (PRN):  diphenhydrAMINE 25 milliGRAM(s) Oral every 6 hours PRN cough  diphenhydrAMINE 25 milliGRAM(s) Oral every 6 hours PRN Rash and/or Itching  simethicone 80 milliGRAM(s) Chew every 6 hours PRN Gas      Physical Exam:  Constitutional: NAD, A+O x3  CV: RR S1S2 no m/r/g  Lungs: CTA b/l, good air flow b/l  Abdomen: soft, softly-distended, mildly tender. No guarding, no rebound, normal bowel sounds  Skin: Maculopapular, moderately pruritic rash on her abdomen, groin and back area  Incision: clean, dry, intact  Extremities: no lower extremity edema or calf tenderness bilaterally    LABS:               10.4   10.77 )-----------( 432      ( 06-05 @ 05:30 )             32.2                11.0   8.81  )-----------( 414      ( 06-04 @ 05:30 )             32.8                11.0   9.64  )-----------( 402      ( 06-03 @ 05:58 )             33.3                11.3   8.71  )-----------( 448      ( 06-02 @ 18:11 )             35.0       06-05    138    |  102    |  9      ----------------------------<  93     3.6     |  22     |  0.63   06-04    138    |  101    |  7      ----------------------------<  95     3.6     |  21<L>  |  0.61     Ca    8.9        05 Jun 2024 05:30  Ca    9.2        04 Jun 2024 05:30  Phos  3.7       06-05  Phos  3.6       06-04  Mg     2.20      06-05  Mg     2.20      06-04    TPro  7.0    /  Alb  3.6    /  TBili  0.3    /  DBili  x      /  AST  148<H>  /  ALT  304<H>  /  AlkPhos  356<H>  06-04          Urinalysis Basic - ( 05 Jun 2024 05:30 )    Color: x / Appearance: x / SG: x / pH: x  Gluc: 93 mg/dL / Ketone: x  / Bili: x / Urobili: x   Blood: x / Protein: x / Nitrite: x   Leuk Esterase: x / RBC: x / WBC x   Sq Epi: x / Non Sq Epi: x / Bacteria: x

## 2024-06-05 NOTE — PROGRESS NOTE ADULT - ASSESSMENT
30 yo POD 11 Ex-Lap, TREVON, BSO, Pelvic and para-aortic LND, Omentectomy, Appendectomy, Frozen = Carcinoma, now returning with fevers, cough with pulmonary nodules present on CT chest.    #Abnormal CT chest  #Cough  #Fevers  CT chest from 5/30 with multiple sub 4mm B/L LL pulmonary nodules similar to CT from 5/14. No PE. Low suspicion that nodules are source of fever. No evidence of pneumonia.  Cough is intermittently productive, endorses some possible cough-variant asthma symptoms vs GERD. Has been using flonase without improvement in cough. Little improvement with cough suppressant therapy.  Has been on room air, lungs clear to auscultation.   - continue cough suppression with benzonatate 100mg TID   - continue flonase 2 sprays each nostril BID given some report of mild post nasal drip  - start empiric GERD treatment with pantoprazole 40 mg daily  - start empiric asthma treatment with duonebs q6, budesonide nebs 0.5 BID   - send RVP (please add on full RVP panel)  - will monitor improvement in cough with above therapy  - abx per primary team, FRANCIA Aguilar MD  Pulmonary and Critical Care Fellow

## 2024-06-05 NOTE — PROVIDER CONTACT NOTE (OTHER) - ASSESSMENT
a&ox4. no signs acute distress. VSS. pt afebrile. no complaints SOB, chest pain, dizziness, headache.

## 2024-06-05 NOTE — CHART NOTE - NSCHARTNOTEFT_GEN_A_CORE
Patient seen and examined at bedside, was febrile this afternoon with worsening rash. Was seen by dermatology and ID this afternoon. Antibiotics were transitioned and patient felt better and has remained afebrile.   Vital Signs Last 24 Hours  T(C): 37.2 (06-05-24 @ 13:19), Max: 38.5 (06-05-24 @ 12:00)  HR: 104 (06-05-24 @ 13:19) (69 - 109)  BP: 101/57 (06-05-24 @ 13:19) (93/59 - 103/61)  RR: 18 (06-05-24 @ 13:19) (17 - 18)  SpO2: 100% (06-05-24 @ 13:19) (98% - 100%)    I&O's Summary    04 Jun 2024 07:01  -  05 Jun 2024 07:00  --------------------------------------------------------  IN: 560 mL / OUT: 1400 mL / NET: -840 mL        Physical Exam:  General: NAD  Lungs: breathing comfortably on RA  Skin: diffuse micropapular rash     Labs:             10.9<L>  14.16<H> )-----------( 491<H>    ( 06-05 @ 13:02 )             33.0<L>               10.4<L>  10.77<H> )-----------( 432<H>    ( 06-05 @ 05:30 )             32.2<L>               11.0<L>  8.81  )-----------( 414<H>    ( 06-04 @ 05:30 )             32.8<L>               11.0<L>  9.64  )-----------( 402<H>    ( 06-03 @ 05:58 )             33.3<L>               11.3<L>  8.71  )-----------( 448<H>    ( 06-02 @ 18:11 )             35.0         MEDICATIONS  (STANDING):  albuterol    90 MICROgram(s) HFA Inhaler 2 Puff(s) Inhalation every 6 hours  albuterol/ipratropium for Nebulization 3 milliLiter(s) Nebulizer every 6 hours  apixaban 2.5 milliGRAM(s) Oral every 12 hours  benzocaine/menthol Lozenge 1 Lozenge Oral every 4 hours  benzonatate 100 milliGRAM(s) Oral every 8 hours  buDESOnide    Inhalation Suspension 0.5 milliGRAM(s) Inhalation every 12 hours  ciprofloxacin   IVPB 400 milliGRAM(s) IV Intermittent every 12 hours  famotidine    Tablet 20 milliGRAM(s) Oral daily  fluticasone propionate 50 MICROgram(s)/spray Nasal Spray 2 Spray(s) Both Nostrils two times a day  guaiFENesin  milliGRAM(s) Oral every 12 hours  ibuprofen  Tablet. 600 milliGRAM(s) Oral every 6 hours  metroNIDAZOLE  IVPB      metroNIDAZOLE  IVPB 500 milliGRAM(s) IV Intermittent every 8 hours  pantoprazole    Tablet 40 milliGRAM(s) Oral before breakfast  triamcinolone 0.1% Ointment 1 Application(s) Topical every 12 hours    MEDICATIONS  (PRN):  diphenhydrAMINE 25 milliGRAM(s) Oral every 6 hours PRN cough  diphenhydrAMINE 25 milliGRAM(s) Oral every 6 hours PRN Rash and/or Itching  simethicone 80 milliGRAM(s) Chew every 6 hours PRN Gas      30 yo POD#15 s/p  Ex-Lap, TREVON, BSO, Pelvic and para-aortic LND, Omentectomy, Appendectomy, Frozen = Carcinoma. Patient was discharged on POD 4 with improved pain control and afebrile. At home she was in her usual state of health until 5/29 and presented to the ED on 5/30 in the setting of fevers of unknown origin. Patient recently post-op with URI symptoms. CTA neg for PE, but showing small pulmonary nodules. CT Abd/Pelvis reveals post-surgical changes with possible seroma in R adnexa. Patient febrile this afternoon for 101F and ID consulted. Zosyn stopped and Flagyl and Cipro started. Patient now endorses feeling better after dc of zosyn and Derm recommended steroid cream which was ordered for patient.     DTaveras PGY2

## 2024-06-05 NOTE — PROGRESS NOTE ADULT - ATTENDING COMMENTS
Patient seen and examined, agree with gyn onc fellow and resident  Reimage given persistent fevers  Change abx due to rash likely due to drug reaction  Plan of care discussed with Dr. Galarza from ID

## 2024-06-05 NOTE — PROGRESS NOTE ADULT - SUBJECTIVE AND OBJECTIVE BOX
Interval Events:  - pulmonary reconsulted for lung nodules, cough  - patient reports cough began after recent abdominal surgery  - has been having intermittent cough with white sputum production  - no sob, chest pain, hemoptysis     REVIEW OF SYSTEMS:  Negative except as documented above.      OBJECTIVE:  ICU Vital Signs Last 24 Hrs  T(C): 38.5 (05 Jun 2024 12:00), Max: 39.3 (04 Jun 2024 17:00)  T(F): 101.3 (05 Jun 2024 12:00), Max: 102.7 (04 Jun 2024 17:00)  HR: 109 (05 Jun 2024 12:00) (69 - 109)  BP: 103/61 (05 Jun 2024 12:00) (93/59 - 117/63)  BP(mean): --  ABP: --  ABP(mean): --  RR: 18 (05 Jun 2024 12:00) (17 - 18)  SpO2: 98% (05 Jun 2024 12:00) (98% - 100%)    O2 Parameters below as of 05 Jun 2024 10:04  Patient On (Oxygen Delivery Method): room air              06-04 @ 07:01  -  06-05 @ 07:00  --------------------------------------------------------  IN: 560 mL / OUT: 1400 mL / NET: -840 mL      CAPILLARY BLOOD GLUCOSE          PHYSICAL EXAM:  General: NAD  HEENT:  EOMI, sclera anicteric, moist mucus membranes  Neck: supple  Cardiovascular: RRR  Respiratory: CTAB, no wheezes, crackles, or rhonci  Abdomen: soft  Extremities: warm and well perfused, no edema, no clubbing  Skin: no rashes  Neurological: no focal deficits    HOSPITAL MEDICATIONS:  MEDICATIONS  (STANDING):  apixaban 2.5 milliGRAM(s) Oral every 12 hours  benzocaine/menthol Lozenge 1 Lozenge Oral every 4 hours  benzonatate 100 milliGRAM(s) Oral every 8 hours  ciprofloxacin   IVPB 400 milliGRAM(s) IV Intermittent every 12 hours  famotidine    Tablet 20 milliGRAM(s) Oral daily  fluticasone propionate 50 MICROgram(s)/spray Nasal Spray 2 Spray(s) Both Nostrils two times a day  guaiFENesin  milliGRAM(s) Oral every 12 hours  ibuprofen  Tablet. 600 milliGRAM(s) Oral every 6 hours  metroNIDAZOLE  IVPB      metroNIDAZOLE  IVPB 500 milliGRAM(s) IV Intermittent every 8 hours    MEDICATIONS  (PRN):  diphenhydrAMINE 25 milliGRAM(s) Oral every 6 hours PRN cough  diphenhydrAMINE 25 milliGRAM(s) Oral every 6 hours PRN Rash and/or Itching  simethicone 80 milliGRAM(s) Chew every 6 hours PRN Gas      LABS:                        10.9   14.16 )-----------( 491      ( 05 Jun 2024 13:02 )             33.0     Hgb Trend: 10.9<--, 10.4<--, 11.0<--, 11.0<--, 11.3<--  06-05    135  |  99  |  8   ----------------------------<  112<H>  3.9   |  21<L>  |  0.61    Ca    8.9      05 Jun 2024 13:02  Phos  3.7     06-05  Mg     2.20     06-05    TPro  6.9  /  Alb  3.4  /  TBili  0.5  /  DBili  x   /  AST  77<H>  /  ALT  179<H>  /  AlkPhos  347<H>  06-05    Creatinine Trend: 0.61<--, 0.63<--, 0.61<--, 0.72<--, 0.75<--, 0.67<--    Urinalysis Basic - ( 05 Jun 2024 13:02 )    Color: x / Appearance: x / SG: x / pH: x  Gluc: 112 mg/dL / Ketone: x  / Bili: x / Urobili: x   Blood: x / Protein: x / Nitrite: x   Leuk Esterase: x / RBC: x / WBC x   Sq Epi: x / Non Sq Epi: x / Bacteria: x            MICROBIOLOGY:     Culture - Urine (collected 03 Jun 2024 19:03)  Source: Clean Catch Clean Catch (Midstream)  Final Report (04 Jun 2024 22:22):    No growth    Culture - Blood (collected 02 Jun 2024 18:35)  Source: .Blood Blood  Preliminary Report (04 Jun 2024 21:01):    No growth at 48 Hours    Culture - Blood (collected 02 Jun 2024 18:15)  Source: .Blood Blood  Preliminary Report (04 Jun 2024 22:02):    No growth at 48 Hours        RADIOLOGY:  [x] Reviewed and interpreted by me

## 2024-06-05 NOTE — PROGRESS NOTE ADULT - ASSESSMENT
28 yo POD#15 s/p  Ex-Lap, TREVON, BSO, Pelvic and para-aortic LND, Omentectomy, Appendectomy, Frozen = Carcinoma. Patient was discharged on POD 4 with improved pain control and afebrile. At home she was in her usual state of health until 5/29 and presented to the ED on 5/30 in the setting of fevers of unknown origin. Patient recently post-op with URI symptoms. CTA neg for PE, but showing small pulmonary nodules. CT Abd/Pelvis reveals post-surgical changes with possible seroma in R adnexa. Patient febrile yesterday evening has remained afebrile overnight. Pt with new-onset rash on her lower abdomen, groin and back area. Of note, in the setting of pt's recent rash, meropenem was discontinued yesterday and patient was put back on zosyn for continued antibiotic management.    Neuro: PO ibuprofen PRN, holding tylenol in the setting of transaminitis  CV: Hemodynamically stable. f/u AM CBC  Pulm: O2 sat WNL on RA. Continue encouraging IS use  - throat lozenges for cough support  - CTA with no PE, but with multiple bilateral lower lobe sub-4 mm nodules, without significant interval change compared with the immediate prior CT chest 5/14/2024.  - CXR with clear lungs  - Pulm consulted, stating unlikely fevers 2/2 to pulmonary nodules, would defer bronchoscopy at this time, recommended trial cough suppression with benzonatate 100mg TID and flonase 2 sprays each nostril BID  GI: Tolerating regular diet  : Voiding spontaneously  - Patient with clear/yellow vaginal discharge, tampon test performed on 6/2 to evaluate for possible vesicovaginal fistula, negative. On VE by Dr. Lew fluctuance noted at the cuff concerning for possible collection  - f/u CTAP (6/2) A 7.0 x 2.5 x 2.4 cm abscess in the post hysterectomy bed. Re-demonstrated rim-enhancing fluid collection in the aortocaval space, consistent with postoperative fluid collection, possibly lymphocele, seroma, or abscess.  Heme: apixaban 2.5mg BID; SCDs while in bed    ID: appreciate ID recommendations  - Zosyn (6/4-)  - Meropenem(6/3-4)  - s/p zosyn (5/30 -6/3)  - Leukocytosis downtrending 17->12.51->10.59->(6/4)  - Procalcitonin 0.24, full viral panel neg, acute hepatitis panel, HIV, and MRSA swab neg  - GGT (6/1): 266->371  - UCx (5/30)-f: >3 specimen, previously growing with E faecalis   - continue to monitor fever curve, afebrile overnight  - CT Chest and AP(5/30): Mult b/l lower lub sub-4mm nodules similar to prior. No PE. No Pleural effusion. R hepatic lobe lesions 1.2cm, stable compared to prior. Hypoattenuating structure measuring 3.1 x 2.4 cm in the region of the ligated right ovarian vein, possibly seroma or lymphangioma.   - RUQ sono (6/1): Centimeter sized central RIGHT hyperechoic lesion, Contracted gallbladder without stones. Possible millimeter size gallbladder mural polyp.  Likely noncontributory to fevers  - LLE dopplers (6/1) neg  - f/u rpt LE dopplers (6/4)  - f/u urine histoplasma ag, QuantiFeron gold and crypto ag  - ID recommendations: OK with broadening to meropenam, consider US liver, consider US pelvis, consider LE duplex to r/o DVT, serum GGT to r/o gallbladder disease, f/u Bcx and adjust abx therapy accordingly, f/u pulmonary nodules  - ID reconsulted for pt's persistent fevers and new onset rash, appreciate recs  - F/u BCx(5/30 and 6/2): NGTD  - Ucx(6/3)-NG-f  - IR Recs: Pt not a candidate for drainage 2/2 to poor window cw antibx, monitoring VS.   will reconsult IR to reassess in the setting of pt's persistent fevers and lack of clinical improvement     Skin: New onset rash. Will consult Derm for further management  FEN: SLIV, Replete electrolytes PRN. F/u AM BMP/mg/phos    Dispo: continue inpatient management.     Senait Tejada PGY1   Seen with Gyn Onc team 30 yo POD#15 s/p  Ex-Lap, TREVON, BSO, Pelvic and para-aortic LND, Omentectomy, Appendectomy, Frozen = Carcinoma. Patient was discharged on POD 4 with improved pain control and afebrile. At home she was in her usual state of health until 5/29 and presented to the ED on 5/30 in the setting of fevers of unknown origin. Patient recently post-op with URI symptoms. CTA neg for PE, but showing small pulmonary nodules. CT Abd/Pelvis reveals post-surgical changes with possible seroma in R adnexa. Patient febrile yesterday evening has remained afebrile overnight. Pt with new-onset rash on her lower abdomen, groin and back area. Of note, in the setting of pt's recent rash, meropenem was discontinued yesterday and patient was put back on zosyn for continued antibiotic management.    Neuro: PO ibuprofen PRN, holding tylenol in the setting of transaminitis  CV: Hemodynamically stable. f/u AM CBC  Pulm: O2 sat WNL on RA. Continue encouraging IS use  - throat lozenges for cough support  - CTA with no PE, but with multiple bilateral lower lobe sub-4 mm nodules, without significant interval change compared with the immediate prior CT chest 5/14/2024.  - CXR with clear lungs  - Pulm consulted, stating unlikely fevers 2/2 to pulmonary nodules, would defer bronchoscopy at this time, recommended trial cough suppression with benzonatate 100mg TID and flonase 2 sprays each nostril BID  GI: Tolerating regular diet  : Voiding spontaneously  - Patient with clear/yellow vaginal discharge, tampon test performed on 6/2 to evaluate for possible vesicovaginal fistula, negative. On VE by Dr. Lew fluctuance noted at the cuff concerning for possible collection  - f/u CTAP (6/2) A 7.0 x 2.5 x 2.4 cm abscess in the post hysterectomy bed. Re-demonstrated rim-enhancing fluid collection in the aortocaval space, consistent with postoperative fluid collection, possibly lymphocele, seroma, or abscess.  Heme: apixaban 2.5mg BID; SCDs while in bed    ID: appreciate ID recommendations  - Zosyn (6/4-)  - Meropenem(6/3-4)  - s/p zosyn (5/30 -6/3)  - Leukocytosis downtrending 17->12.51->10.59->(6/4)  - Procalcitonin 0.24, full viral panel neg, acute hepatitis panel, HIV, and MRSA swab neg  - GGT (6/1): 266->371  - UCx (5/30)-f: >3 specimen, previously growing with E faecalis   - continue to monitor fever curve, afebrile overnight  - CT Chest and AP(5/30): Mult b/l lower lub sub-4mm nodules similar to prior. No PE. No Pleural effusion. R hepatic lobe lesions 1.2cm, stable compared to prior. Hypoattenuating structure measuring 3.1 x 2.4 cm in the region of the ligated right ovarian vein, possibly seroma or lymphangioma.   - RUQ sono (6/1): Centimeter sized central RIGHT hyperechoic lesion, Contracted gallbladder without stones. Possible millimeter size gallbladder mural polyp.  Likely noncontributory to fevers  - LLE dopplers (6/1) neg  - f/u rpt LE dopplers (6/4)  - f/u urine histoplasma ag, QuantiFeron gold and crypto ag  - ID recommendations: OK with broadening to meropenam, consider US liver, consider US pelvis, consider LE duplex to r/o DVT, serum GGT to r/o gallbladder disease, f/u Bcx and adjust abx therapy accordingly, f/u pulmonary nodules  - ID reconsulted for pt's persistent fevers and new onset rash, appreciate recs  - F/u BCx(5/30 and 6/2): NGTD  - Ucx(6/3)-NG-f  - IR Recs: Pt not a candidate for drainage 2/2 to poor window cw antibx, monitoring VS.   will reconsult IR to reassess in the setting of pt's persistent fevers and lack of clinical improvement     Skin: New onset rash. Benadryl PRN. Will consult Derm for further management  FEN: SLIV, Replete electrolytes PRN. F/u AM BMP/mg/phos    Dispo: continue inpatient management.     Senait Tejada PGY1   Seen with Gyn Onc team

## 2024-06-05 NOTE — PROGRESS NOTE ADULT - ASSESSMENT
30 yo woman with ovarian/ adnexal mass     s/p TREVON- BSO, LN dissection 5/21  frozen -- carcinoma   path reported today endometrioid adenocarcino      returned 5/30 with fever, SOB, pleuritic chest pain     in retrospect patient reports was having high fever prior to surgery     CT no PE, pulmonary nodules, hypoattenuating structure surrounding surgical clip along ligated ovarian vein     blood cultures from 5/30 no growth   6/2 no growth     Repeat CT shows pelvic abscess   --  IR eval  too risky     initially treated with zosyn     broadened to meropenem 5/3    today body rash  - most likely due to zosyn      fever may be related to drug reaction     Suggest:    for repeat imaging;  would include chest     blood cultures ordered     would d/c zosyn     start flagyl 500 mg iv q 8 h and cipro 400 mg iv q 12 h

## 2024-06-05 NOTE — PROGRESS NOTE ADULT - ATTENDING COMMENTS
Patient is a 28 yo F w/ recently diagnosed endometrioid adenocarcinoma, s/p recent ex-lap, TREVON, BSO, pelvic and para-aortic LND, omentectomy, appendectomy who was readmitted on 5/30 for recurrent fever as well as cough. Pulmonary reconsulted for persistent fevers.    CT notable for sub 4mm lung nodules that are stable from prior scan. Interval CT AP notable for 7 x 2.5 x 2.4 abscess in post hysterectomy bed    Labs notable for mild leukocytosis WBC 14.6, Alk P 347, AST 77,     #Fevers - Likely source intraabdominal abscess. Lung nodules not source of fever  #Pulmonary nodules:   #Cough - Differential includes cough variant asthma vs GERD vs post nasal drip vs URI  - Recommend full RVP  - Recommend initiation of PPI, patient endorses cough in morning and known oropharyngeal/upper airway cobblestoning indicative of possible reflux. Family history of asthma  - Recommend empiric Duonebs q6h ATC and Pulmicort nebs q12h for now  - Consider repeat scan for multiple subsolid nodules sub 6 mm in 3-6 months.   - Infectious workup as per primary team/ID    Erich Barnett MD  Pulmonary & Critical Care

## 2024-06-05 NOTE — CHART NOTE - NSCHARTNOTEFT_GEN_A_CORE
Dermatology Chart Note    HPI: 28 yo POD#15 s/p  Ex-Lap, TREVON, BSO, Pelvic and para-aortic LND, Omentectomy, Appendectomy, Frozen = Carcinoma. Patient was discharged on POD 4 with improved pain control and afebrile. At home she was in her usual state of health until 5/29 and presented to the ED on 5/30 in the setting of fevers of unknown origin. Patient recently post-op with URI symptoms. CTA neg for PE, but showing small pulmonary nodules. CT Abd/Pelvis reveals post-surgical changes with possible seroma in R adnexa. Patient febrile yesterday evening has remained afebrile overnight. Pt with new-onset rash on her lower abdomen, groin and back area. Of note, in the setting of pt's recent rash, meropenem was discontinued yesterday and patient was put back on zosyn for continued antibiotic management.    DERM HPI: Dermatology consulted for rash as above. Notes it started yesterday afternoon. Received 2 doses of meropenem prior (midnight the night before and yesterday morning).   Zosyn 5/30-6/3  Meropenem 6/4    PHYSICAL EXAM:  pink macules and papules coalescing into patches and plaques on proximal trunk with flexural predominance    Labs: no eosinophilia, Cr unremarkable, LFTs elevated (possibly iso recent postop tylenol)    ASSESSMENT/PLAN:  # Favor morbilliform drug exanthem (possibly 2/2 zosyn), less likely DIHS given distribution although cannot rule out  - START triamcinolone 0.1% ointment BID to affected areas  - Will continue to follow    Discussed with primary team.  Discussed with attending, Dr. Nirav Lynne MD  Resident Physician, PGY3  [facility]  Office: [phone]  Pager: [phone]   **Please page with [phone] callback # for further related questions.**

## 2024-06-06 LAB
ALBUMIN SERPL ELPH-MCNC: 3.4 G/DL — SIGNIFICANT CHANGE UP (ref 3.3–5)
ALP SERPL-CCNC: 336 U/L — HIGH (ref 40–120)
ALT FLD-CCNC: 177 U/L — HIGH (ref 4–33)
ANION GAP SERPL CALC-SCNC: 16 MMOL/L — HIGH (ref 7–14)
AST SERPL-CCNC: 70 U/L — HIGH (ref 4–32)
BASOPHILS # BLD AUTO: 0.03 K/UL — SIGNIFICANT CHANGE UP (ref 0–0.2)
BASOPHILS NFR BLD AUTO: 0.2 % — SIGNIFICANT CHANGE UP (ref 0–2)
BILIRUB SERPL-MCNC: 0.3 MG/DL — SIGNIFICANT CHANGE UP (ref 0.2–1.2)
BUN SERPL-MCNC: 9 MG/DL — SIGNIFICANT CHANGE UP (ref 7–23)
CALCIUM SERPL-MCNC: 8.6 MG/DL — SIGNIFICANT CHANGE UP (ref 8.4–10.5)
CHLORIDE SERPL-SCNC: 101 MMOL/L — SIGNIFICANT CHANGE UP (ref 98–107)
CO2 SERPL-SCNC: 20 MMOL/L — LOW (ref 22–31)
CREAT SERPL-MCNC: 0.67 MG/DL — SIGNIFICANT CHANGE UP (ref 0.5–1.3)
CULTURE RESULTS: NO GROWTH — SIGNIFICANT CHANGE UP
EGFR: 120 ML/MIN/1.73M2 — SIGNIFICANT CHANGE UP
EOSINOPHIL # BLD AUTO: 0.48 K/UL — SIGNIFICANT CHANGE UP (ref 0–0.5)
EOSINOPHIL NFR BLD AUTO: 4 % — SIGNIFICANT CHANGE UP (ref 0–6)
GLUCOSE SERPL-MCNC: 144 MG/DL — HIGH (ref 70–99)
HCT VFR BLD CALC: 30.5 % — LOW (ref 34.5–45)
HGB BLD-MCNC: 9.7 G/DL — LOW (ref 11.5–15.5)
IANC: 8.6 K/UL — HIGH (ref 1.8–7.4)
IMM GRANULOCYTES NFR BLD AUTO: 1 % — HIGH (ref 0–0.9)
LYMPHOCYTES # BLD AUTO: 18.7 % — SIGNIFICANT CHANGE UP (ref 13–44)
LYMPHOCYTES # BLD AUTO: 2.26 K/UL — SIGNIFICANT CHANGE UP (ref 1–3.3)
MCHC RBC-ENTMCNC: 26.9 PG — LOW (ref 27–34)
MCHC RBC-ENTMCNC: 31.8 GM/DL — LOW (ref 32–36)
MCV RBC AUTO: 84.7 FL — SIGNIFICANT CHANGE UP (ref 80–100)
MONOCYTES # BLD AUTO: 0.61 K/UL — SIGNIFICANT CHANGE UP (ref 0–0.9)
MONOCYTES NFR BLD AUTO: 5 % — SIGNIFICANT CHANGE UP (ref 2–14)
NEUTROPHILS # BLD AUTO: 8.6 K/UL — HIGH (ref 1.8–7.4)
NEUTROPHILS NFR BLD AUTO: 71.1 % — SIGNIFICANT CHANGE UP (ref 43–77)
NON-GYNECOLOGICAL CYTOLOGY STUDY: SIGNIFICANT CHANGE UP
NRBC # BLD: 0 /100 WBCS — SIGNIFICANT CHANGE UP (ref 0–0)
NRBC # FLD: 0 K/UL — SIGNIFICANT CHANGE UP (ref 0–0)
PLATELET # BLD AUTO: 474 K/UL — HIGH (ref 150–400)
POTASSIUM SERPL-MCNC: 3.1 MMOL/L — LOW (ref 3.5–5.3)
POTASSIUM SERPL-SCNC: 3.1 MMOL/L — LOW (ref 3.5–5.3)
PROT SERPL-MCNC: 6.8 G/DL — SIGNIFICANT CHANGE UP (ref 6–8.3)
RBC # BLD: 3.6 M/UL — LOW (ref 3.8–5.2)
RBC # FLD: 13.1 % — SIGNIFICANT CHANGE UP (ref 10.3–14.5)
SODIUM SERPL-SCNC: 137 MMOL/L — SIGNIFICANT CHANGE UP (ref 135–145)
SPECIMEN SOURCE: SIGNIFICANT CHANGE UP
WBC # BLD: 12.1 K/UL — HIGH (ref 3.8–10.5)
WBC # FLD AUTO: 12.1 K/UL — HIGH (ref 3.8–10.5)

## 2024-06-06 PROCEDURE — 99232 SBSQ HOSP IP/OBS MODERATE 35: CPT

## 2024-06-06 PROCEDURE — 99233 SBSQ HOSP IP/OBS HIGH 50: CPT | Mod: GC

## 2024-06-06 PROCEDURE — 99223 1ST HOSP IP/OBS HIGH 75: CPT | Mod: GC

## 2024-06-06 RX ORDER — POTASSIUM CHLORIDE 20 MEQ
20 PACKET (EA) ORAL
Refills: 0 | Status: COMPLETED | OUTPATIENT
Start: 2024-06-06 | End: 2024-06-06

## 2024-06-06 RX ADMIN — Medication 0.5 MILLIGRAM(S): at 09:41

## 2024-06-06 RX ADMIN — Medication 20 MILLIEQUIVALENT(S): at 11:32

## 2024-06-06 RX ADMIN — FAMOTIDINE 20 MILLIGRAM(S): 10 INJECTION INTRAVENOUS at 11:31

## 2024-06-06 RX ADMIN — Medication 200 MILLIGRAM(S): at 18:14

## 2024-06-06 RX ADMIN — Medication 200 MILLIGRAM(S): at 07:04

## 2024-06-06 RX ADMIN — Medication 100 MILLIGRAM(S): at 21:52

## 2024-06-06 RX ADMIN — BENZOCAINE AND MENTHOL 1 LOZENGE: 5; 1 LIQUID ORAL at 13:45

## 2024-06-06 RX ADMIN — BENZOCAINE AND MENTHOL 1 LOZENGE: 5; 1 LIQUID ORAL at 17:12

## 2024-06-06 RX ADMIN — Medication 1 APPLICATION(S): at 21:51

## 2024-06-06 RX ADMIN — Medication 600 MILLIGRAM(S): at 16:01

## 2024-06-06 RX ADMIN — Medication 1 APPLICATION(S): at 08:19

## 2024-06-06 RX ADMIN — Medication 3 MILLILITER(S): at 21:37

## 2024-06-06 RX ADMIN — Medication 100 MILLIGRAM(S): at 13:46

## 2024-06-06 RX ADMIN — Medication 100 MILLIGRAM(S): at 21:51

## 2024-06-06 RX ADMIN — Medication 25 MILLIGRAM(S): at 09:15

## 2024-06-06 RX ADMIN — Medication 3 MILLILITER(S): at 17:19

## 2024-06-06 RX ADMIN — Medication 600 MILLIGRAM(S): at 03:36

## 2024-06-06 RX ADMIN — BENZOCAINE AND MENTHOL 1 LOZENGE: 5; 1 LIQUID ORAL at 21:52

## 2024-06-06 RX ADMIN — Medication 600 MILLIGRAM(S): at 02:36

## 2024-06-06 RX ADMIN — APIXABAN 2.5 MILLIGRAM(S): 2.5 TABLET, FILM COATED ORAL at 06:12

## 2024-06-06 RX ADMIN — APIXABAN 2.5 MILLIGRAM(S): 2.5 TABLET, FILM COATED ORAL at 17:13

## 2024-06-06 RX ADMIN — Medication 3 MILLILITER(S): at 05:04

## 2024-06-06 RX ADMIN — PANTOPRAZOLE SODIUM 40 MILLIGRAM(S): 20 TABLET, DELAYED RELEASE ORAL at 07:04

## 2024-06-06 RX ADMIN — Medication 3 MILLILITER(S): at 10:21

## 2024-06-06 RX ADMIN — Medication 600 MILLIGRAM(S): at 05:54

## 2024-06-06 RX ADMIN — Medication 600 MILLIGRAM(S): at 17:10

## 2024-06-06 RX ADMIN — Medication 20 MILLIEQUIVALENT(S): at 09:54

## 2024-06-06 RX ADMIN — Medication 2 SPRAY(S): at 05:54

## 2024-06-06 RX ADMIN — Medication 100 MILLIGRAM(S): at 05:53

## 2024-06-06 RX ADMIN — Medication 0.5 MILLIGRAM(S): at 21:39

## 2024-06-06 RX ADMIN — BENZOCAINE AND MENTHOL 1 LOZENGE: 5; 1 LIQUID ORAL at 02:36

## 2024-06-06 RX ADMIN — Medication 2 SPRAY(S): at 17:13

## 2024-06-06 RX ADMIN — Medication 25 MILLIGRAM(S): at 22:00

## 2024-06-06 NOTE — PROGRESS NOTE ADULT - SUBJECTIVE AND OBJECTIVE BOX
Subjective:   Pt seen and examined at bedside. No events overnight. Pain well controlled. Patient ambulating. Passing flatus. Tolerating regular diet. Pt denies fever, chills, chest pain, SOB, nausea, vomiting, lightheadedness, dizziness.      Objective:    MEDICATIONS  (STANDING):  albuterol    90 MICROgram(s) HFA Inhaler 2 Puff(s) Inhalation every 6 hours  albuterol/ipratropium for Nebulization 3 milliLiter(s) Nebulizer every 6 hours  apixaban 2.5 milliGRAM(s) Oral every 12 hours  benzocaine/menthol Lozenge 1 Lozenge Oral every 4 hours  benzonatate 100 milliGRAM(s) Oral every 8 hours  buDESOnide    Inhalation Suspension 0.5 milliGRAM(s) Inhalation every 12 hours  ciprofloxacin   IVPB 400 milliGRAM(s) IV Intermittent every 12 hours  famotidine    Tablet 20 milliGRAM(s) Oral daily  fluticasone propionate 50 MICROgram(s)/spray Nasal Spray 2 Spray(s) Both Nostrils two times a day  guaiFENesin  milliGRAM(s) Oral every 12 hours  ibuprofen  Tablet. 600 milliGRAM(s) Oral every 6 hours  metroNIDAZOLE  IVPB 500 milliGRAM(s) IV Intermittent every 8 hours  metroNIDAZOLE  IVPB      pantoprazole    Tablet 40 milliGRAM(s) Oral before breakfast  triamcinolone 0.1% Ointment 1 Application(s) Topical every 12 hours    MEDICATIONS  (PRN):  diphenhydrAMINE 25 milliGRAM(s) Oral every 6 hours PRN Rash and/or Itching  diphenhydrAMINE 25 milliGRAM(s) Oral every 6 hours PRN cough  simethicone 80 milliGRAM(s) Chew every 6 hours PRN Gas      T(F): 97.9 (06-06-24 @ 01:33), Max: 101.3 (06-05-24 @ 12:00)  HR: 97 (06-06-24 @ 01:33) (90 - 109)  BP: 99/53 (06-06-24 @ 01:33) (93/59 - 103/61)  RR: 18 (06-06-24 @ 01:33) (17 - 18)  SpO2: 99% (06-06-24 @ 01:33) (98% - 100%)  Wt(kg): --    Physical Exam:  Constitutional: NAD, A+O x3  CV: RRR  Lungs: Clear to auscultation bilaterally  Abdomen: Soft, nondistended, no guarding or rebound tenderness. Normal bowel sounds  Incision: Clean, dry, intact  : No bleeding on pad  Extremities: No lower extremity edema or calf tenderness bilaterally; venodynes in place    LABS:  06-05    135    |  99     |  8      ----------------------------<  112<H>  3.9     |  21<L>  |  0.61   06-05    138    |  102    |  9      ----------------------------<  93     3.6     |  22     |  0.63     Ca    8.9        05 Jun 2024 13:02  Ca    8.9        05 Jun 2024 05:30  Phos  3.7       06-05  Mg     2.20      06-05    TPro  6.9    /  Alb  3.4    /  TBili  0.5    /  DBili  x      /  AST  77<H>  /  ALT  179<H>  /  AlkPhos  347<H>  06-05          Urinalysis Basic - ( 05 Jun 2024 13:02 )    Color: x / Appearance: x / SG: x / pH: x  Gluc: 112 mg/dL / Ketone: x  / Bili: x / Urobili: x   Blood: x / Protein: x / Nitrite: x   Leuk Esterase: x / RBC: x / WBC x   Sq Epi: x / Non Sq Epi: x / Bacteria: x      CAPILLARY BLOOD GLUCOSE          I&O's Summary    04 Jun 2024 07:01  -  05 Jun 2024 07:00  --------------------------------------------------------  IN: 560 mL / OUT: 1400 mL / NET: -840 mL    05 Jun 2024 07:01  -  06 Jun 2024 05:57  --------------------------------------------------------  IN: 1690 mL / OUT: 2400 mL / NET: -710 mL           Subjective:   Pt seen and examined at bedside. No events overnight. States that cough has improved with albuterol and nebulizer treatment. Rash has become more itchy to the patient but it does improve with the steroid cream. Patient with slight nausea with doses of antibiotics.     Objective:    MEDICATIONS  (STANDING):  albuterol    90 MICROgram(s) HFA Inhaler 2 Puff(s) Inhalation every 6 hours  albuterol/ipratropium for Nebulization 3 milliLiter(s) Nebulizer every 6 hours  apixaban 2.5 milliGRAM(s) Oral every 12 hours  benzocaine/menthol Lozenge 1 Lozenge Oral every 4 hours  benzonatate 100 milliGRAM(s) Oral every 8 hours  buDESOnide    Inhalation Suspension 0.5 milliGRAM(s) Inhalation every 12 hours  ciprofloxacin   IVPB 400 milliGRAM(s) IV Intermittent every 12 hours  famotidine    Tablet 20 milliGRAM(s) Oral daily  fluticasone propionate 50 MICROgram(s)/spray Nasal Spray 2 Spray(s) Both Nostrils two times a day  guaiFENesin  milliGRAM(s) Oral every 12 hours  ibuprofen  Tablet. 600 milliGRAM(s) Oral every 6 hours  metroNIDAZOLE  IVPB 500 milliGRAM(s) IV Intermittent every 8 hours  metroNIDAZOLE  IVPB      pantoprazole    Tablet 40 milliGRAM(s) Oral before breakfast  triamcinolone 0.1% Ointment 1 Application(s) Topical every 12 hours    MEDICATIONS  (PRN):  diphenhydrAMINE 25 milliGRAM(s) Oral every 6 hours PRN Rash and/or Itching  diphenhydrAMINE 25 milliGRAM(s) Oral every 6 hours PRN cough  simethicone 80 milliGRAM(s) Chew every 6 hours PRN Gas      T(F): 97.9 (06-06-24 @ 01:33), Max: 101.3 (06-05-24 @ 12:00)  HR: 97 (06-06-24 @ 01:33) (90 - 109)  BP: 99/53 (06-06-24 @ 01:33) (93/59 - 103/61)  RR: 18 (06-06-24 @ 01:33) (17 - 18)  SpO2: 99% (06-06-24 @ 01:33) (98% - 100%)  Wt(kg): --    Physical Exam:  Constitutional: NAD, A+O x3  CV: RRR  Lungs: Clear to auscultation bilaterally  Abdomen: Soft, nondistended, no guarding or rebound tenderness.   Incision: Midline incision clean, dry, intact  Skin. Maculopapular rash slightly erythematous that extends from torso to lateral thigh  : No bleeding on pad  Extremities: No lower extremity edema       LABS:  06-05    135    |  99     |  8      ----------------------------<  112<H>  3.9     |  21<L>  |  0.61   06-05    138    |  102    |  9      ----------------------------<  93     3.6     |  22     |  0.63     Ca    8.9        05 Jun 2024 13:02  Ca    8.9        05 Jun 2024 05:30  Phos  3.7       06-05  Mg     2.20      06-05    TPro  6.9    /  Alb  3.4    /  TBili  0.5    /  DBili  x      /  AST  77<H>  /  ALT  179<H>  /  AlkPhos  347<H>  06-05          Urinalysis Basic - ( 05 Jun 2024 13:02 )    Color: x / Appearance: x / SG: x / pH: x  Gluc: 112 mg/dL / Ketone: x  / Bili: x / Urobili: x   Blood: x / Protein: x / Nitrite: x   Leuk Esterase: x / RBC: x / WBC x   Sq Epi: x / Non Sq Epi: x / Bacteria: x      CAPILLARY BLOOD GLUCOSE          I&O's Summary    04 Jun 2024 07:01  -  05 Jun 2024 07:00  --------------------------------------------------------  IN: 560 mL / OUT: 1400 mL / NET: -840 mL    05 Jun 2024 07:01  -  06 Jun 2024 05:57  --------------------------------------------------------  IN: 1690 mL / OUT: 2400 mL / NET: -710 mL           Subjective:     Pt seen and examined at bedside. No events overnight. States that cough has improved with albuterol and nebulizer treatment, however reports palpitations and tremulousness after the nebulizer treatment. Rash has become more pruritic to the patient but it does improve with the steroid cream. Patient with slight nausea with doses of antibiotics. She denies subjective fevers overnight.     Objective:    MEDICATIONS  (STANDING):  albuterol    90 MICROgram(s) HFA Inhaler 2 Puff(s) Inhalation every 6 hours  albuterol/ipratropium for Nebulization 3 milliLiter(s) Nebulizer every 6 hours  apixaban 2.5 milliGRAM(s) Oral every 12 hours  benzocaine/menthol Lozenge 1 Lozenge Oral every 4 hours  benzonatate 100 milliGRAM(s) Oral every 8 hours  buDESOnide    Inhalation Suspension 0.5 milliGRAM(s) Inhalation every 12 hours  ciprofloxacin   IVPB 400 milliGRAM(s) IV Intermittent every 12 hours  famotidine    Tablet 20 milliGRAM(s) Oral daily  fluticasone propionate 50 MICROgram(s)/spray Nasal Spray 2 Spray(s) Both Nostrils two times a day  guaiFENesin  milliGRAM(s) Oral every 12 hours  ibuprofen  Tablet. 600 milliGRAM(s) Oral every 6 hours  metroNIDAZOLE  IVPB 500 milliGRAM(s) IV Intermittent every 8 hours  metroNIDAZOLE  IVPB      pantoprazole    Tablet 40 milliGRAM(s) Oral before breakfast  triamcinolone 0.1% Ointment 1 Application(s) Topical every 12 hours    MEDICATIONS  (PRN):  diphenhydrAMINE 25 milliGRAM(s) Oral every 6 hours PRN Rash and/or Itching  diphenhydrAMINE 25 milliGRAM(s) Oral every 6 hours PRN cough  simethicone 80 milliGRAM(s) Chew every 6 hours PRN Gas      T(F): 97.9 (06-06-24 @ 01:33), Max: 101.3 (06-05-24 @ 12:00)  HR: 97 (06-06-24 @ 01:33) (90 - 109)  BP: 99/53 (06-06-24 @ 01:33) (93/59 - 103/61)  RR: 18 (06-06-24 @ 01:33) (17 - 18)  SpO2: 99% (06-06-24 @ 01:33) (98% - 100%)  Wt(kg): --    Physical Exam:  Constitutional: NAD, A+O x3  CV: RRR  Lungs: Clear to auscultation bilaterally  Abdomen: Soft, nondistended, no guarding or rebound tenderness.   Incision: Midline incision clean, dry, intact  Skin. Maculopapular rash slightly erythematous that extends from torso to lateral thigh  : No bleeding on pad  Extremities: No lower extremity edema       LABS:  06-05    135    |  99     |  8      ----------------------------<  112<H>  3.9     |  21<L>  |  0.61   06-05    138    |  102    |  9      ----------------------------<  93     3.6     |  22     |  0.63     Ca    8.9        05 Jun 2024 13:02  Ca    8.9        05 Jun 2024 05:30  Phos  3.7       06-05  Mg     2.20      06-05    TPro  6.9    /  Alb  3.4    /  TBili  0.5    /  DBili  x      /  AST  77<H>  /  ALT  179<H>  /  AlkPhos  347<H>  06-05    Urinalysis Basic - ( 05 Jun 2024 13:02 )    Color: x / Appearance: x / SG: x / pH: x  Gluc: 112 mg/dL / Ketone: x  / Bili: x / Urobili: x   Blood: x / Protein: x / Nitrite: x   Leuk Esterase: x / RBC: x / WBC x   Sq Epi: x / Non Sq Epi: x / Bacteria: x    I&O's Summary    04 Jun 2024 07:01  -  05 Jun 2024 07:00  --------------------------------------------------------  IN: 560 mL / OUT: 1400 mL / NET: -840 mL    05 Jun 2024 07:01  -  06 Jun 2024 05:57  --------------------------------------------------------  IN: 1690 mL / OUT: 2400 mL / NET: -710 mL

## 2024-06-06 NOTE — PROGRESS NOTE ADULT - ASSESSMENT
30 yo woman with ovarian/ adnexal mass     s/p TREVON- BSO, LN dissection 5/21  frozen -- carcinoma   path  endometrioid adenocarcinoma     returned 5/30 with fever, SOB, pleuritic chest pain     in retrospect patient reports was having high fever prior to surgery     CT no PE, pulmonary nodules, hypoattenuating structure surrounding surgical clip along ligated ovarian vein     blood cultures from 5/30 no growth   6/2 no growth     Repeat CT showed pelvic abscess   --  IR eval  too risky     initially treated with zosyn     broadened to meropenem 5/3    developed diffuse body rash  - most likely due to zosyn      fever may be related to drug reaction     CT 6/5 smaller collections     LFTs and WBC elevation can also be due to zosyn     Suggest:    c/w  flagyl 500 mg iv q 8 h and cipro 400 mg iv q 12 h     when ready to d/c home can switch to flagyl 500 mg po q 12 h  and cipro 500 mg po q 12 h --> 6/12    trend LFTs

## 2024-06-06 NOTE — PROGRESS NOTE ADULT - ATTENDING COMMENTS
Patient seen and examined, agree with gyn onc fellow and resident  Monitor fever curve  F/u cultures  Continue cipro/flagyl

## 2024-06-06 NOTE — DIETITIAN INITIAL EVALUATION ADULT - ADD RECOMMEND
1. Monitor weights, labs, BM's, skin integrity, PO intake 2  2. Please monitor % PO intake on flowsheets   3. Honor food preferences as able within therapeutic diet order.

## 2024-06-06 NOTE — DIETITIAN NUTRITION RISK NOTIFICATION - ADDITIONAL COMMENTS/DIETITIAN RECOMMENDATIONS
Please see Dietitian Initial Assessment for complete recommendations.     Kirsten Funes MS RDN CDN  On [employer] Teams, Pager #[phone]

## 2024-06-06 NOTE — CONSULT NOTE ADULT - ASSESSMENT
# Favor morbilliform drug exanthem (possibly 2/2 zosyn), less likely DIHS given distribution/labs not suggestive (Cr wnl, no eosinophilia, LFTs decreasing) although cannot exclude possiblity entirely at this point  - C/w triamcinolone 0.1% ointment BID to affected areas  - Will continue to follow    Patient was seen at bedside and staffed in person with the dermatology attending Dr. Gastelum.   Recommendations were communicated with the primary team.  Please page [phone] for further related questions.    Alee Lynne MD  Resident Physician, PGY3  [facility]  Pager: [phone]

## 2024-06-06 NOTE — DIETITIAN INITIAL EVALUATION ADULT - ORAL INTAKE PTA/DIET HISTORY
Patient seen at bedside with mother present. Patient reports food allergy to kiwi. No known food intolerances. Patient denies any chewing or swallowing difficulty with regular solids or thin liquids. Patient does not take any nutrition supplements at home. Patient reports she typically maintains a good appetite, however decreased starting in early May 2024 secondary to pain and feeling "feverish." Reports consuming smaller quantities of food during this time, likely meeting <75% estimated energy needs as a result.    Patient reports usual body weight 145lb. Suspects recent weight loss  Per Marky PALOMO, noted the following weight history: 69.9kg (5/30), 70.8kg (5/14)  Objective weight measurements suggest 0.9kg (1%) weight loss x2 weeks period of time (not significant)

## 2024-06-06 NOTE — PROGRESS NOTE ADULT - ASSESSMENT
30 yo POD 11 Ex-Lap, TREVON, BSO, Pelvic and para-aortic LND, Omentectomy, Appendectomy, Frozen = Carcinoma, now returning with fevers, cough with pulmonary nodules present on CT chest.    #Abnormal CT chest  #Cough  #Fevers  CT chest from  with multiple sub 4mm B/L LL pulmonary nodules similar to CT from . No PE. Low suspicion that nodules are source of fever. No evidence of pneumonia.  Cough is intermittently productive, endorses some possible cough-variant asthma symptoms vs GERD. Has been using flonase without improvement in cough. Little improvement with cough suppressant therapy.  Has been on room air, lungs clear to auscultation.   - continue cough suppression with benzonatate 100mg TID   - continue flonase 2 sprays each nostril BID given some report of mild post nasal drip  - start empiric GERD treatment with pantoprazole 40 mg daily  - start empiric asthma treatment with duonebs q6  - stop budensonide nebs, start symbicort 80-4.5 2 puffs BID, rinse mouth after use  - will monitor improvement in cough with above therapy  - abx per primary team, ID    Prior to discharge:  Please email: [email or web address] to setup an appointment prior to discharge. Please email on the day prior to anticipated discharge. The patient will be given a telehealth appointment in 1-2 days following discharge. Include the name, , hospital of discharge, expected date of discharge, diagnosis, and preferred phone number. Please include the date and time of the appointment on the patient's discharge summary and that the appointment is telehealth.    P[facility]  [address] [place], [place] [address] [phone]        Vianney Aguilar MD  Pulmonary and Critical Care Fellow

## 2024-06-06 NOTE — CONSULT NOTE ADULT - CONSULT REASON
Fevers of Unknown Origin
post op fever
cough, fevers, lung nodule
Rash
Abdominal fluid collections for drainage
postop fever, abscess

## 2024-06-06 NOTE — CONSULT NOTE ADULT - SUBJECTIVE AND OBJECTIVE BOX
HPI:  28 yo POD 9 Ex-Lap, TREVON, BSO, Pelvic and para-aortic LND, Omentectomy, Appendectomy, Frozen = Carcinoma. Patient was discharged on POD 4 with improved pain control and afebrile. At home she was in her usual state of health until yesterday. She spiked a low grade temp at 4p yesterday at 4p of 100.7. She took some Tylenol and fever resolved. She then had a 101.5 at 9m on 5/29. She called the answering service which advised her to come to ED. She did not come overnight because she felt weak and tired. She took Tylenol and Motrin overnight. Today around 6a she had another low grade temp to 100.4 and at 145p today had a temperature of 101.2.   She endorses URI symptoms, including cough, and fevers before the surgery, prompting evaluation by PCP, who prescribed her amoxicillin. She reports finishing the course of abx before the surgery. Denies any sick contacts and is not having any notable GI/ symptoms. States loose stools but no diarrhea and denies any abnormal vaginal bleeding or discharge. Currently denies productive cough. Denies runny nose.     DERM HPI:  Dermatology consulted for rash as above. Notes it started 6/4 afternoon. Received 2 doses of meropenem prior (midnight the night before and morning of).   Zosyn 5/30-6/3, d/tuan 6/5  Meropenem 6/4, dced after rash started  Cipro/flagyl 6/5-present    PAST MEDICAL & SURGICAL HISTORY:  H/O pelvic mass      Ovarian cystic mass      Urinary tract infection      H/O upper respiratory infection      H/O eczema      History of keloid of skin      No significant past surgical history          Review of Systems: ____________________________________  REVIEW OF SYSTEMS      General: + fever, chills, lethargy    Skin/Breast: see HPI  	  Ophthalmologic: no eye pain or change in vision  	  ENMT: no dysphagia or change in hearing    Respiratory and Thorax: no SOB or cough  	  Cardiovascular: no palpitations or chest pain    Gastrointestinal: + abdominal discomfort and distention    Genitourinary: no dysuria or frequency    Musculoskeletal: no joint pains	    Neurological:no weakness, numbness , or tingling    MEDICATIONS  (STANDING):  albuterol    90 MICROgram(s) HFA Inhaler 2 Puff(s) Inhalation every 6 hours  albuterol/ipratropium for Nebulization 3 milliLiter(s) Nebulizer every 6 hours  apixaban 2.5 milliGRAM(s) Oral every 12 hours  benzocaine/menthol Lozenge 1 Lozenge Oral every 4 hours  benzonatate 100 milliGRAM(s) Oral every 8 hours  buDESOnide    Inhalation Suspension 0.5 milliGRAM(s) Inhalation every 12 hours  ciprofloxacin   IVPB 400 milliGRAM(s) IV Intermittent every 12 hours  famotidine    Tablet 20 milliGRAM(s) Oral daily  fluticasone propionate 50 MICROgram(s)/spray Nasal Spray 2 Spray(s) Both Nostrils two times a day  guaiFENesin  milliGRAM(s) Oral every 12 hours  ibuprofen  Tablet. 600 milliGRAM(s) Oral every 6 hours  metroNIDAZOLE  IVPB 500 milliGRAM(s) IV Intermittent every 8 hours  metroNIDAZOLE  IVPB      pantoprazole    Tablet 40 milliGRAM(s) Oral before breakfast  triamcinolone 0.1% Ointment 1 Application(s) Topical every 12 hours    ALLERGIES: azithromycin  piperacillin-tazobactam  Kiwi        SOCIAL HISTORY:  ____________________________________  Social History: Here with mom    FAMILY HISTORY: ____________________________________  FAMILY HISTORY: Noncontributory        VITAL SIGNS LAST 24 HOURS:  T(F): 98.3 (06-06 @ 21:18), Max: 100.4 (06-06 @ 15:53)  HR: 100 (06-06 @ 21:18) (82 - 106)  BP: 100/63 (06-06 @ 21:18) (96/59 - 120/60)  RR: 18 (06-06 @ 21:18) (15 - 18)    ___________________________________  PHYSICAL EXAM:     The patient was alert and oriented X 3  OP showed no ulcerations  There was no visible lymphadenopathy.  Conjunctiva were non injected  There was no clubbing or edema of extremities.  The scalp, hair, face, eyebrows, lips, OP, neck, chest, back,   extremities X 4, nails were examined.  There was no hyperhidrosis or bromhidrosis.    Of note on skin exam:   pink papules coalescing into plaques on trunk with proximal and flexural predominance  ____________________________________    LABS:                        9.7    12.10 )-----------( 474      ( 06 Jun 2024 05:35 )             30.5     06-06    137  |  101  |  9   ----------------------------<  144<H>  3.1<L>   |  20<L>  |  0.67    Ca    8.6      06 Jun 2024 05:35  Phos  3.7     06-05  Mg     2.20     06-05    TPro  6.8  /  Alb  3.4  /  TBili  0.3  /  DBili  x   /  AST  70<H>  /  ALT  177<H>  /  AlkPhos  336<H>  06-06      Urinalysis Basic - ( 06 Jun 2024 05:35 )    Color: x / Appearance: x / SG: x / pH: x  Gluc: 144 mg/dL / Ketone: x  / Bili: x / Urobili: x   Blood: x / Protein: x / Nitrite: x   Leuk Esterase: x / RBC: x / WBC x   Sq Epi: x / Non Sq Epi: x / Bacteria: x

## 2024-06-06 NOTE — DIETITIAN INITIAL EVALUATION ADULT - PERTINENT LABORATORY DATA
06-06    137  |  101  |  9   ----------------------------<  144<H>  3.1<L>   |  20<L>  |  0.67    Ca    8.6      06 Jun 2024 05:35  Phos  3.7     06-05  Mg     2.20     06-05    TPro  6.8  /  Alb  3.4  /  TBili  0.3  /  DBili  x   /  AST  70<H>  /  ALT  177<H>  /  AlkPhos  336<H>  06-06  A1C with Estimated Average Glucose Result: 5.0 % (05-14-24 @ 17:25)

## 2024-06-06 NOTE — PROGRESS NOTE ADULT - ATTENDING COMMENTS
Patient is a 28 yo F w/ recently diagnosed endometrioid adenocarcinoma, s/p recent ex-lap, TREVON, BSO, pelvic and para-aortic LND, omentectomy, appendectomy who was readmitted on  for recurrent fever as well as cough. Pulmonary reconsulted for persistent fevers.    CT notable for sub 4mm lung nodules that are stable from prior scan. Interval CT AP notable for 7 x 2.5 x 2.4 abscess in post hysterectomy bed    Repeat CT CAP overnight with stable chest exam, with interval decrease in abdominal collections    #Fevers - Likely source intraabdominal abscess. Lung nodules not source of fever  #Pulmonary nodules  #Cough - Differential includes cough variant asthma vs GERD vs post nasal drip vs URI. Improved with nebs  - c/w PPI, patient endorses cough in morning and known oropharyngeal/upper airway cobblestoning indicative of possible reflux. Family history of asthma  - Can start Symbicort 80 BID, rinse mouth after use (would discharge with Symbicort).  - Can d/c Pulmicort nebs  - c/w Duonebs q6h ATC for now, (would discharge with Albuterol PRN on discharge)  - Consider repeat scan for multiple subsolid nodules sub 6 mm in 3-6 months.   - Infectious workup as per primary team/ID  - Antitussive PRN  - Needs outpatient pulmonary follow up upon discharge at [address] [place] [address], [address] ([phone]).  Please e-mail [email or web address] to make an appointment for the patient.  Please include the name, , and a phone number for you and the patient)    Erich Barnett MD  Pulmonary & Critical Care

## 2024-06-06 NOTE — PROVIDER CONTACT NOTE (OTHER) - ASSESSMENT
pt. in no acute distress, awake, alert and oriented x4, denies chest pain and SOB, denies difficulty breathing, reports feeling palpitations and sweating, afebrile

## 2024-06-06 NOTE — DIETITIAN INITIAL EVALUATION ADULT - PERTINENT MEDS FT
MEDICATIONS  (STANDING):  albuterol    90 MICROgram(s) HFA Inhaler 2 Puff(s) Inhalation every 6 hours  albuterol/ipratropium for Nebulization 3 milliLiter(s) Nebulizer every 6 hours  apixaban 2.5 milliGRAM(s) Oral every 12 hours  benzocaine/menthol Lozenge 1 Lozenge Oral every 4 hours  benzonatate 100 milliGRAM(s) Oral every 8 hours  buDESOnide    Inhalation Suspension 0.5 milliGRAM(s) Inhalation every 12 hours  ciprofloxacin   IVPB 400 milliGRAM(s) IV Intermittent every 12 hours  famotidine    Tablet 20 milliGRAM(s) Oral daily  fluticasone propionate 50 MICROgram(s)/spray Nasal Spray 2 Spray(s) Both Nostrils two times a day  guaiFENesin  milliGRAM(s) Oral every 12 hours  ibuprofen  Tablet. 600 milliGRAM(s) Oral every 6 hours  metroNIDAZOLE  IVPB 500 milliGRAM(s) IV Intermittent every 8 hours  metroNIDAZOLE  IVPB      pantoprazole    Tablet 40 milliGRAM(s) Oral before breakfast  triamcinolone 0.1% Ointment 1 Application(s) Topical every 12 hours    MEDICATIONS  (PRN):  diphenhydrAMINE 25 milliGRAM(s) Oral every 6 hours PRN Rash and/or Itching  diphenhydrAMINE 25 milliGRAM(s) Oral every 6 hours PRN cough  simethicone 80 milliGRAM(s) Chew every 6 hours PRN Gas

## 2024-06-06 NOTE — CONSULT NOTE ADULT - CONSULT REQUESTED DATE/TIME
05-Jun-2024 12:00
06-Jun-2024 09:09
31-May-2024 10:26
03-Jun-2024 14:15
30-May-2024 17:32
01-Jun-2024 17:00

## 2024-06-06 NOTE — CHART NOTE - NSCHARTNOTEFT_GEN_A_CORE
Patient evaluated at bedside this afternoon. Denies complaints. + OOB, Tolerating diet.    Objective  T(C): 36.9 (06-06-24 @ 17:23), Max: 38 (06-06-24 @ 15:53)  HR: 95 (06-06-24 @ 17:23) (82 - 106)  BP: 120/60 (06-06-24 @ 17:23) (96/59 - 120/60)  RR: 18 (06-06-24 @ 17:23) (15 - 18)  SpO2: 98% (06-06-24 @ 17:23) (98% - 100%)  Wt(kg): --    06-05 @ 07:01  -  06-06 @ 07:00  --------------------------------------------------------  IN: 1910 mL / OUT: 2400 mL / NET: -490 mL    06-06 @ 07:01  -  06-06 @ 17:54  --------------------------------------------------------  IN: 120 mL / OUT: 0 mL / NET: 120 mL        Gen: NAD  Abd: Soft NT  Ext: NT BL    albuterol    90 MICROgram(s) HFA Inhaler 2 Puff(s) Inhalation every 6 hours  albuterol/ipratropium for Nebulization 3 milliLiter(s) Nebulizer every 6 hours  apixaban 2.5 milliGRAM(s) Oral every 12 hours  benzocaine/menthol Lozenge 1 Lozenge Oral every 4 hours  benzonatate 100 milliGRAM(s) Oral every 8 hours  buDESOnide    Inhalation Suspension 0.5 milliGRAM(s) Inhalation every 12 hours  ciprofloxacin   IVPB 400 milliGRAM(s) IV Intermittent every 12 hours  diphenhydrAMINE 25 milliGRAM(s) Oral every 6 hours PRN  diphenhydrAMINE 25 milliGRAM(s) Oral every 6 hours PRN  famotidine    Tablet 20 milliGRAM(s) Oral daily  fluticasone propionate 50 MICROgram(s)/spray Nasal Spray 2 Spray(s) Both Nostrils two times a day  guaiFENesin  milliGRAM(s) Oral every 12 hours  ibuprofen  Tablet. 600 milliGRAM(s) Oral every 6 hours  metroNIDAZOLE  IVPB 500 milliGRAM(s) IV Intermittent every 8 hours  metroNIDAZOLE  IVPB      pantoprazole    Tablet 40 milliGRAM(s) Oral before breakfast  simethicone 80 milliGRAM(s) Chew every 6 hours PRN  triamcinolone 0.1% Ointment 1 Application(s) Topical every 12 hours Patient evaluated at bedside this afternoon. Reports concern of itchy rash on back and proximal lower extremities. Dermatology was consulted this afternoon, pending response. Experienced a mild fever of 38 degrees Celsius this afternoon prior to evaluation. Suspected adverse reaction to Zosin.  + OOB, Tolerating diet.    Objective  T(C): 36.9 (06-06-24 @ 17:23), Max: 38 (06-06-24 @ 15:53)  HR: 95 (06-06-24 @ 17:23) (82 - 106)  BP: 120/60 (06-06-24 @ 17:23) (96/59 - 120/60)  RR: 18 (06-06-24 @ 17:23) (15 - 18)  SpO2: 98% (06-06-24 @ 17:23) (98% - 100%)  Wt(kg): --    06-05 @ 07:01  -  06-06 @ 07:00  --------------------------------------------------------  IN: 1910 mL / OUT: 2400 mL / NET: -490 mL    06-06 @ 07:01  -  06-06 @ 17:54  --------------------------------------------------------  IN: 120 mL / OUT: 0 mL / NET: 120 mL        Gen: NAD  Abd: Soft NT  Ext: NT BL    albuterol    90 MICROgram(s) HFA Inhaler 2 Puff(s) Inhalation every 6 hours  albuterol/ipratropium for Nebulization 3 milliLiter(s) Nebulizer every 6 hours  apixaban 2.5 milliGRAM(s) Oral every 12 hours  benzocaine/menthol Lozenge 1 Lozenge Oral every 4 hours  benzonatate 100 milliGRAM(s) Oral every 8 hours  buDESOnide    Inhalation Suspension 0.5 milliGRAM(s) Inhalation every 12 hours  ciprofloxacin   IVPB 400 milliGRAM(s) IV Intermittent every 12 hours  diphenhydrAMINE 25 milliGRAM(s) Oral every 6 hours PRN  diphenhydrAMINE 25 milliGRAM(s) Oral every 6 hours PRN  famotidine    Tablet 20 milliGRAM(s) Oral daily  fluticasone propionate 50 MICROgram(s)/spray Nasal Spray 2 Spray(s) Both Nostrils two times a day  guaiFENesin  milliGRAM(s) Oral every 12 hours  ibuprofen  Tablet. 600 milliGRAM(s) Oral every 6 hours  metroNIDAZOLE  IVPB 500 milliGRAM(s) IV Intermittent every 8 hours  metroNIDAZOLE  IVPB      pantoprazole    Tablet 40 milliGRAM(s) Oral before breakfast  simethicone 80 milliGRAM(s) Chew every 6 hours PRN  triamcinolone 0.1% Ointment 1 Application(s) Topical every 12 hours Patient evaluated at bedside this afternoon. Reports concern of itchy rash on back and proximal lower extremities. Responsive to benadryl. Not responsive to Kenalog cream. Febrile again at 4pm to 38.0. Hardy subjective fevers/ chills at night. + OOB to chair, Tolerating regular diet.  Cough improved this afternoon.     Physical Exam:  Constitutional: NAD, A+O x3  Lungs: Clear to auscultation bilaterally  Abdomen: Soft, nondistended, no guarding or rebound tenderness.   Incision: Midline incision clean, dry, intact  Skin. Maculopapular rash slightly erythematous that extends from torso to lateral thigh; now extending to her b/l wrists.    : No bleeding on pad  Extremities: No lower extremity edema     28 yo POD#15 s/p  Ex-Lap, TREVON, BSO, Pelvic and para-aortic LND, Omentectomy, Appendectomy, Frozen = Carcinoma. Patient was discharged on POD 4 with improved pain control and afebrile. At home she was in her usual state of health until 5/29 and presented to the ED on 5/30 in the setting of fevers of unknown origin. Pt persistently febrile while inpatient, most recently today at 4pm.     - Abscess in surgical bed decreasing in size, not amenable to drainage per re-consult with IR   - ID following. Leading differential diagnosis is a drug reaction from Zosyn. C/w Cipro and Flagyl. Will need to continue until 6/12   - Dermatology following. F/U recs.   - Pulm following: Treating cough with empiric treatment for asthma and GERD. Cough worse in AM. Will continue to follow.     [facility] team   Faiza Serrano PGY-3

## 2024-06-06 NOTE — CONSULT NOTE ADULT - SUBJECTIVE AND OBJECTIVE BOX
Vascular & Interventional Radiology    28 yo POD#15 s/p Ex-Lap, TREVON, BSO, Pelvic and para-aortic LND, Omentectomy, Appendectomy, Frozen = Carcinoma. Patient was discharged on POD 4 with improved pain control and afebrile. At home she was in her usual state of health until 5/29 and presented to the ED on 5/30 in the setting of fevers of unknown origin. Patient recently post-op with URI symptoms. CT abd/pelvis shows aorto-caval RP collection and post surgical bed collection.  IR consulted for drainage    Allergies: azithromycin (Hives)  piperacillin-tazobactam (Rash)    Medications (Abx/Cardiac/Anticoagulation/Blood Products):  -apixaban  -ciprofloxacin  -metronidazole    Data:    T(C): 36.6  HR: 106  BP: 103/54  RR: 18  SpO2: 100%    -WBC 12.10 / HgB 9.7 / Hct 30.5 / Plt 474  -Na 137 / Cl 101 / BUN 9 / Glucose 144  -K 3.1 / CO2 20 / Cr 0.67  - / Alk Phos 336 / T.Bili 0.3    Imaging:   CT abd/pelvis: Interval decrease in size of the aortocaval rim-enhancing collection measuring 2.2 x 1.1 cm, previously 2.9 x 1.4 cm (remeasured). Interval decrease in size of the surgical bed rim-enhancing collection, which is now  into few smaller rim-enhancing loculated collections. One of the collections measures to 3.3 x 2.0 cm.    Assessment:   29y Female POD#15 s/p Ex-Lap, TREVON, BSO with persistent fevers and intrabdominal/retroperitoneal fluid collections. IR consulted for drainage    Plan:   -discussed case and reviewed imaging with Dr. Musa and Dr. Miranda  -at this time, both the aorto-caval and post hysterectomy surgical bed collection are not amenable to drainage secondary to no proper anatomic window as well as the low volume of fluid collections. Additionally, fluid collections are decreasing in size   -IR signing off  -discussed with FILIPE Hugo

## 2024-06-06 NOTE — PROGRESS NOTE ADULT - SUBJECTIVE AND OBJECTIVE BOX
Interval Events:  - on room air  - cough somewhat improved with nebulizer treatments   - repeat ct chest with no change in nodules       REVIEW OF SYSTEMS:  Negative except as documented above.      OBJECTIVE:  ICU Vital Signs Last 24 Hrs  T(C): 37.2 (06 Jun 2024 13:51), Max: 37.2 (06 Jun 2024 13:51)  T(F): 99 (06 Jun 2024 13:51), Max: 99 (06 Jun 2024 13:51)  HR: 82 (06 Jun 2024 13:51) (82 - 106)  BP: 102/62 (06 Jun 2024 13:51) (96/59 - 103/54)  BP(mean): --  ABP: --  ABP(mean): --  RR: 15 (06 Jun 2024 13:51) (15 - 18)  SpO2: 100% (06 Jun 2024 13:51) (99% - 100%)    O2 Parameters below as of 06 Jun 2024 13:51  Patient On (Oxygen Delivery Method): room air              06-05 @ 07:01  -  06-06 @ 07:00  --------------------------------------------------------  IN: 1910 mL / OUT: 2400 mL / NET: -490 mL      CAPILLARY BLOOD GLUCOSE          PHYSICAL EXAM:  General: NAD  HEENT:  EOMI, sclera anicteric, moist mucus membranes  Neck: supple  Cardiovascular: RRR  Respiratory: CTAB, no wheezes, crackles, or rhonci  Abdomen: soft, nontender  Extremities: warm and well perfused, no edema, no clubbing  Skin: no rashes  Neurological: no focal deficits    HOSPITAL MEDICATIONS:  MEDICATIONS  (STANDING):  albuterol    90 MICROgram(s) HFA Inhaler 2 Puff(s) Inhalation every 6 hours  albuterol/ipratropium for Nebulization 3 milliLiter(s) Nebulizer every 6 hours  apixaban 2.5 milliGRAM(s) Oral every 12 hours  benzocaine/menthol Lozenge 1 Lozenge Oral every 4 hours  benzonatate 100 milliGRAM(s) Oral every 8 hours  buDESOnide    Inhalation Suspension 0.5 milliGRAM(s) Inhalation every 12 hours  ciprofloxacin   IVPB 400 milliGRAM(s) IV Intermittent every 12 hours  famotidine    Tablet 20 milliGRAM(s) Oral daily  fluticasone propionate 50 MICROgram(s)/spray Nasal Spray 2 Spray(s) Both Nostrils two times a day  guaiFENesin  milliGRAM(s) Oral every 12 hours  ibuprofen  Tablet. 600 milliGRAM(s) Oral every 6 hours  metroNIDAZOLE  IVPB      metroNIDAZOLE  IVPB 500 milliGRAM(s) IV Intermittent every 8 hours  pantoprazole    Tablet 40 milliGRAM(s) Oral before breakfast  triamcinolone 0.1% Ointment 1 Application(s) Topical every 12 hours    MEDICATIONS  (PRN):  diphenhydrAMINE 25 milliGRAM(s) Oral every 6 hours PRN cough  diphenhydrAMINE 25 milliGRAM(s) Oral every 6 hours PRN Rash and/or Itching  simethicone 80 milliGRAM(s) Chew every 6 hours PRN Gas      LABS:                        9.7    12.10 )-----------( 474      ( 06 Jun 2024 05:35 )             30.5     Hgb Trend: 9.7<--, 10.9<--, 10.4<--, 11.0<--, 11.0<--  06-06    137  |  101  |  9   ----------------------------<  144<H>  3.1<L>   |  20<L>  |  0.67    Ca    8.6      06 Jun 2024 05:35  Phos  3.7     06-05  Mg     2.20     06-05    TPro  6.8  /  Alb  3.4  /  TBili  0.3  /  DBili  x   /  AST  70<H>  /  ALT  177<H>  /  AlkPhos  336<H>  06-06    Creatinine Trend: 0.67<--, 0.61<--, 0.63<--, 0.61<--, 0.72<--, 0.75<--    Urinalysis Basic - ( 06 Jun 2024 05:35 )    Color: x / Appearance: x / SG: x / pH: x  Gluc: 144 mg/dL / Ketone: x  / Bili: x / Urobili: x   Blood: x / Protein: x / Nitrite: x   Leuk Esterase: x / RBC: x / WBC x   Sq Epi: x / Non Sq Epi: x / Bacteria: x            MICROBIOLOGY:     Culture - Fungal, Blood (collected 05 Jun 2024 16:00)  Source: .Blood Blood  Preliminary Report (06 Jun 2024 09:42):    Testing in progress    Culture - Urine (collected 03 Jun 2024 19:03)  Source: Clean Catch Clean Catch (Midstream)  Final Report (04 Jun 2024 22:22):    No growth        RADIOLOGY:  [x] Reviewed and interpreted by me

## 2024-06-06 NOTE — PROGRESS NOTE ADULT - SUBJECTIVE AND OBJECTIVE BOX
Follow Up:      Inverval History/ROS:Patient is a 29y old  Female who presents with a chief complaint of post op fevers (01 Jun 2024 04:50)    afebrile  overall feeling better   rash itchy       Allergies    azithromycin (Hives)  Kiwi (Hives (Mild))    Intolerances        ANTIMICROBIALS:  ciprofloxacin   IVPB 400 every 12 hours  metroNIDAZOLE  IVPB    metroNIDAZOLE  IVPB 500 every 8 hours      MEDICATIONS  (STANDING):  albuterol    90 MICROgram(s) HFA Inhaler 2 every 6 hours  albuterol/ipratropium for Nebulization 3 every 6 hours  apixaban 2.5 every 12 hours  benzonatate 100 every 8 hours  buDESOnide    Inhalation Suspension 0.5 every 12 hours  diphenhydrAMINE 25 every 6 hours PRN  diphenhydrAMINE 25 every 6 hours PRN  famotidine    Tablet 20 daily  guaiFENesin  every 12 hours  ibuprofen  Tablet. 600 every 6 hours  pantoprazole    Tablet 40 before breakfast  simethicone 80 every 6 hours PRN    Vital Signs Last 24 Hrs  T(F): 99 (06-06-24 @ 13:51), Max: 99 (06-06-24 @ 13:51)  HR: 82 (06-06-24 @ 13:51)  BP: 102/62 (06-06-24 @ 13:51)  RR: 15 (06-06-24 @ 13:51)  SpO2: 100% (06-06-24 @ 13:51) (99% - 100%)    PHYSICAL EXAM:  General:  non toxic    in chair   HEAD/EYES:  white sclera  face   Cardiovascular: s1s2  Respiratory:   clear   GI:   soft  :  midline wound   Neurologic: non-focal exam   Skin:  macular papular rash abdomen, back, antecubital fossa, thighs less intensity of erythema   Psychiatric:  [x ] appropriate affect [x ] alert & oriented                          9.7    12.10 )-----------( 474      ( 06 Jun 2024 05:35 )             30.5 06-06    137  |  101  |  9   ----------------------------<  144  3.1   |  20  |  0.67  Ca    8.6      06 Jun 2024 05:35Phos  3.7     06-05Mg     2.20     06-05  TPro  6.8  /  Alb  3.4  /  TBili  0.3  /  DBili  x   /  AST  70  /  ALT  177  /  AlkPhos  336  06-06        Culture - Fungal, Blood (06.05.24 @ 16:00)   Specimen Source: .Blood Blood  Culture Results:   Testing in progress  Culture - Urine (06.05.24 @ 16:21)   Specimen Source: Clean Catch Clean Catch (Midstream)  Culture Results:   No growth  Culture - Blood (06.02.24 @ 18:35)   Specimen Source: .Blood Blood  Culture Results:   No growth at 72 hours  Culture - Blood (06.02.24 @ 18:15)   Specimen Source: .Blood Blood  Culture Results:   No growth at 72 hours    Culture - Urine (05.30.24 @ 19:43)   Specimen Source: Clean Catch Clean Catch (Midstream)  Culture Results:   Culture grew 3 or more types of organisms which indicate   collection contamination; consider recollection only if clinically   indicated.    Culture - Blood (05.30.24 @ 17:26)   Specimen Source: .Blood Blood-Peripheral  Culture Results:   No growth       Culture - Blood (05.30.24 @ 17:15)   Specimen Source: .Blood Blood-Peripheral  Culture Results:   No growth   RADIOLOGY:    < from: CT Angio Chest PE Protocol w/ IV Cont (06.05.24 @ 17:39) >    ACC: [id number] EXAM:  CT ANGIO CHEST PULM ART WAWIC   ORDERED BY: CODY PAULA     ACC: [id number] EXAM:  CT ABDOMEN AND PELVIS IC   ORDERED BY: CODY PAULA     PROCEDURE DATE:  06/05/2024          INTERPRETATION:  CLINICAL INFORMATION: Postop. Fever. Pelvic abscess.   Follow-up study.    COMPARISON: CT abdomen pelvis 6/2/2024. CT chest abdomen pelvis 5/30/2024.    CONTRAST/COMPLICATIONS:  IV Contrast: Omnipaque 350   90 cc administered   10 cc discarded  Oral Contrast: NONE  Complications: None reported at time of study completion    PROCEDURE:  CT Angiography of the Chest was performed followed by portal venous phase   imaging of the Abdomen and Pelvis.  Sagittal and coronal reformats were performed as well as 3D (MIP)   reconstructions.    FINDINGS:  CHEST:  LUNGS AND LARGE AIRWAYS: Patent central airways. A 0.3 cm stable right   lower lobe pulmonary nodule (2/62).  PLEURA: No pleural effusion.  VESSELS: No pulmonary embolism.  HEART: Heart size is normal. No pericardial effusion.  MEDIASTINUM AND SANDHYA: No lymphadenopathy.  CHEST WALL AND LOWER NECK: Within normal limits.    ABDOMEN AND PELVIS:  LIVER: Normal hepatic morphology. Stable indeterminant 1.0 cm   hypoattenuating focus in the segment 8 (4/29).  BILE DUCTS: Normal caliber.  GALLBLADDER: Within normal limits.  SPLEEN: Within normal limits.  PANCREAS: Within normal limits.  ADRENALS: Within normal limits.  KIDNEYS/URETERS: Within normal limits.    BLADDER: Within normal limits.  REPRODUCTIVE ORGANS: Hysterectomy. Interval decrease in size of the   surgical bed rim-enhancing collection, which is now  into few   smaller rim-enhancing loculated collections. One of the collections   measures to 3.3 x 2.0 cm.    BOWEL: Mild mural thickening of the sigmoid colon adjacent to the   previously mentioned pelvic rim-enhancing collections, likely reactive.   Appendectomy. No bowel obstruction.  PERITONEUM: Small left paracolic gutter fluid. Diffuse mild peritoneal   thickening.  VESSELS: Within normal limits.  RETROPERITONEUM/LYMPH NODES: No lymphadenopathy. Interval decrease in   size of the aortocaval rim-enhancing collection measuring 2.2 x 1.1 cm,   previously 2.9 x 1.4 cm (remeasured).  ABDOMINAL WALL: Postsurgical changes.  BONES: Within normal limits.    IMPRESSION:  1.  No pulmonary embolism.  2.  Interval decrease in sizes of pelvic and retroperitoneal   rim-enhancing collections.  3.  Stable indeterminant 1.0 cm hypoattenuating hepatic focus in the   segment 8. Further evaluation with abdominal MR.          --- End of Report ---            JEANNIE PETTY MD; Attending Radiologist  This document has been electronically signed. Jun 5 2024  6:21PM    < end of copied text >

## 2024-06-06 NOTE — PROGRESS NOTE ADULT - ASSESSMENT
30 yo POD#15 s/p  Ex-Lap, TREVON, BSO, Pelvic and para-aortic LND, Omentectomy, Appendectomy, Frozen = Carcinoma. Patient was discharged on POD 4 with improved pain control and afebrile. At home she was in her usual state of health until 5/29 and presented to the ED on 5/30 in the setting of fevers of unknown origin. Patient recently post-op with URI symptoms. CTA neg for PE, but showing small pulmonary nodules. CT Abd/Pelvis reveals post-surgical changes with possible seroma in R adnexa. Patient febrile this afternoon for 101F and ID consulted. Zosyn stopped and Flagyl and Cipro started. Patient now endorses feeling better after dc of zosyn and Derm recommended steroid cream which was ordered for patient.       Neuro: PO ibuprofen PRN, holding tylenol in the setting of transaminitis  CV: Hemodynamically stable. f/u AM CBC  Pulm: O2 sat WNL on RA. Continue encouraging IS use  - throat lozenges for cough support  - CTA with no PE, but with multiple bilateral lower lobe sub-4 mm nodules, without significant interval change compared with the immediate prior CT chest 5/14/2024.  - CXR with clear lungs  - Pulm consulted, stating unlikely fevers 2/2 to pulmonary nodules, would defer bronchoscopy at this time, recommended trial cough suppression with benzonatate 100mg TID and flonase 2 sprays each nostril BID  GI: Tolerating regular diet  : Voiding spontaneously  - Patient with clear/yellow vaginal discharge, tampon test performed on 6/2 to evaluate for possible vesicovaginal fistula, negative. On VE by Dr. Lew fluctuance noted at the cuff concerning for possible collection  - f/u CTAP (6/2) A 7.0 x 2.5 x 2.4 cm abscess in the post hysterectomy bed. Re-demonstrated rim-enhancing fluid collection in the aortocaval space, consistent with postoperative fluid collection, possibly lymphocele, seroma, or abscess.  Heme: apixaban 2.5mg BID; SCDs while in bed    ID: appreciate ID recommendations  - Zosyn (6/4-)  - Meropenem(6/3-4)  - s/p zosyn (5/30 -6/3)  - Leukocytosis downtrending 17->12.51->10.59->(6/4)  - Procalcitonin 0.24, full viral panel neg, acute hepatitis panel, HIV, and MRSA swab neg  - GGT (6/1): 266->371  - UCx (5/30)-f: >3 specimen, previously growing with E faecalis   - continue to monitor fever curve, afebrile overnight  - CT Chest and AP(5/30): Mult b/l lower lub sub-4mm nodules similar to prior. No PE. No Pleural effusion. R hepatic lobe lesions 1.2cm, stable compared to prior. Hypoattenuating structure measuring 3.1 x 2.4 cm in the region of the ligated right ovarian vein, possibly seroma or lymphangioma.   - RUQ sono (6/1): Centimeter sized central RIGHT hyperechoic lesion, Contracted gallbladder without stones. Possible millimeter size gallbladder mural polyp.  Likely noncontributory to fevers  - LLE dopplers (6/1) neg  - f/u rpt LE dopplers (6/4)  - f/u urine histoplasma ag, QuantiFeron gold and crypto ag  - ID recommendations: OK with broadening to meropenam, consider US liver, consider US pelvis, consider LE duplex to r/o DVT, serum GGT to r/o gallbladder disease, f/u Bcx and adjust abx therapy accordingly, f/u pulmonary nodules  - ID reconsulted for pt's persistent fevers and new onset rash, appreciate recs  - F/u BCx(5/30 and 6/2): NGTD  - Ucx(6/3)-NG-f  - IR Recs: Pt not a candidate for drainage 2/2 to poor window cw antibx, monitoring VS.   will reconsult IR to reassess in the setting of pt's persistent fevers and lack of clinical improvement     Skin: New onset rash. Benadryl PRN. Will consult Derm for further management  FEN: SLIV, Replete electrolytes PRN. F/u AM BMP/mg/phos    Dispo: continue inpatient management.    30 yo POD#15 s/p  Ex-Lap, TREVON, BSO, Pelvic and para-aortic LND, Omentectomy, Appendectomy, Frozen = Carcinoma. Patient was discharged on POD 4 with improved pain control and afebrile. At home she was in her usual state of health until 5/29 and presented to the ED on 5/30 in the setting of fevers of unknown origin. Patient recently post-op with URI symptoms. CTA neg for PE, but showing small pulmonary nodules. CT Abd/Pelvis reveals post-surgical changes with possible seroma in R adnexa. Patient febrile this afternoon for 101F and ID consulted. Zosyn stopped and Flagyl and Cipro started. Patient now endorses feeling better after dc of zosyn and Derm recommended steroid cream which was ordered for patient. Overnight patient had CT chest and A/P repeated with findings of no PE and improvement in abdominal collection. In the AM patient doing better with no fevers and endorsing less cough overnight.       Neuro: PO ibuprofen PRN, holding tylenol in the setting of transaminitis  CV: Hemodynamically stable. f/u AM CBC  Pulm: O2 sat WNL on RA. Continue encouraging IS use  - throat lozenges for cough support  - CTA (6/5) with no PE, stable nodules,   - CXR with clear lungs  - Pulm consulted, again yesterday she was started on pantoprazole 40 mg daily, duonebs q6, budesonide nebs 0.5 BID   -f/u RVP panel  GI: Tolerating regular diet  : Voiding spontaneously  - Patient with clear/yellow vaginal discharge, tampon test performed on 6/2 to evaluate for possible vesicovaginal fistula, negative. On VE by Dr. Lew fluctuance noted at the cuff concerning for possible collection  -  CTAP (6/2) A 7.0 x 2.5 x 2.4 cm abscess in the post hysterectomy bed. Re-demonstrated rim-enhancing fluid collection in the aortocaval space, consistent with postoperative fluid collection, possibly lymphocele, seroma, or abscess.  Heme: apixaban 2.5mg BID; SCDs while in bed  - CTAP (6/5): Hysterectomy. Interval decrease in size of the surgical bed rim-enhancing collection, which is now  into few smaller rim-enhancing loculated collections. One of the collections measures to 3.3 x 2.0 cm. Stable indeterminant 1.0 cm hypoattenuating hepatic focus.  ID: appreciate ID recommendations  -IV Flagyl(6/5), IV Cipro(6/5-), pt afebrile ovn  - s/p zosyn (5/30 -6/3, 6/3-6/4), Meropenem(6/3-4), ID believes recent fevers and rash related to delayed rxn to beta lactam   - Leukocytosis trend 17->12.51->10.59->14(6/5)  -Stat Bcx and Ucx from 6/5 obtained after last fever  - Procalcitonin 0.24, full viral panel neg, acute hepatitis panel, HIV, and MRSA swab neg  - UCx (5/30)-f: >3 specimen, previously growing with E faecalis   - RUQ sono (6/1): Centimeter sized central RIGHT hyperechoic lesion, Contracted gallbladder without stones. Possible millimeter size gallbladder mural polyp.  Likely noncontributory to fevers  - GGT (6/1): 266->371  - LLE dopplers (6/1) neg  - f/u rpt LE dopplers (6/4)  - f/u urine histoplasma ag, QuantiFeron gold and crypto ag  - F/u BCx(5/30 and 6/2): NGTD  - Ucx(6/3)-NG-f  - IR Recs: Pt not a candidate for drainage 2/2 to poor window cw antibx, monitoring VS.   will reconsult IR to reassess in the setting of new imaging 6/5 showing improvement in collection.  Skin: New onset rash. Benadryl PRN.  Derm recs appreciated; steroid cream ordered with improvement of sxs, f/u final derm recs/  FEN: SLIV, Replete electrolytes PRN. F/u AM  labs     Dispo: continue inpatient management.       DT[facility]as PGY2 28 yo POD#15 s/p  Ex-Lap, TREVON, BSO, Pelvic and para-aortic LND, Omentectomy, Appendectomy, Frozen = Carcinoma. Patient was discharged on POD 4 with improved pain control and afebrile. At home she was in her usual state of health until 5/29 and presented to the ED on 5/30 in the setting of fevers of unknown origin. Patient recently post-op with URI symptoms. CTA neg for PE, but showing small pulmonary nodules. CT Abd/Pelvis reveals post-surgical changes with possible seroma in R adnexa. Patient febrile this afternoon for 101F and ID consulted. Zosyn stopped and Flagyl and Cipro started. Patient now endorses feeling better after dc of zosyn and Derm recommended steroid cream which was ordered for patient. Overnight patient had CT chest and A/P repeated with findings of no PE and improvement in abdominal collection. In the AM patient doing better with no fevers and endorsing less cough overnight.       Neuro: PO ibuprofen PRN, holding tylenol in the setting of transaminitis  CV: Hemodynamically stable  Pulm: O2 sat WNL on RA. Continue encouraging IS use  - throat lozenges for cough support  - CTA (6/5) with no PE, stable nodules  - CXR with clear lungs  - Pulm consulted, again yesterday she was started on pantoprazole 40 mg daily, duonebs q6, budesonide nebs 0.5 BID   - Repeat RVP panel(6/5) neg  GI: Tolerating regular diet  : Voiding spontaneously  - Patient with clear/yellow vaginal discharge, tampon test performed on 6/2 to evaluate for possible vesicovaginal fistula, negative. On VE by Dr. Lew fluctuance noted at the cuff concerning for possible collection  -  CTAP (6/2) A 7.0 x 2.5 x 2.4 cm abscess in the post hysterectomy bed. Re-demonstrated rim-enhancing fluid collection in the aortocaval space, consistent with postoperative fluid collection, possibly lymphocele, seroma, or abscess.  Heme: apixaban 2.5mg BID; SCDs while in bed  - CTAP (6/5): Hysterectomy. Interval decrease in size of the surgical bed rim-enhancing collection, which is now  into few smaller rim-enhancing loculated collections. One of the collections measures to 3.3 x 2.0 cm. Stable indeterminant 1.0 cm hypoattenuating hepatic focus.  ID: appreciate ID recommendations  - c/w IV Flagyl(6/5), IV Cipro(6/5-), pt afebrile ovn  - s/p zosyn (5/30 -6/3, 6/3-6/4), Meropenem(6/3-4), ID believes recent fevers and rash related to delayed rxn to beta lactam   - Leukocytosis trend 17->12.51->10.59->14->12  - Stat Bcx and Ucx from 6/5 obtained after last fever  - Procalcitonin 0.24, full viral panel neg, acute hepatitis panel, HIV, and MRSA swab neg, cyptococcal antigen neg  - UCx (5/30)-f: >3 specimen, previously growing with E faecalis   - RUQ sono (6/1): Centimeter sized central RIGHT hyperechoic lesion, Contracted gallbladder without stones. Possible millimeter size gallbladder mural polyp.  Likely noncontributory to fevers  - GGT (6/1): 266->371  - LLE dopplers (6/1) neg  - f/u urine histoplasma ag, QuantiFeron gold  - F/u BCx(5/30 and 6/2): NGTD  - Ucx(6/3)-NG-f  - IR Recs: Pt not a candidate for drainage 2/2 to poor window cw antibx, monitoring VS. IR reconsulted overnight to reassess for possible drainage  Skin: New onset rash. Benadryl PRN.  Derm recs appreciated; steroid cream ordered with improvement of sxs, f/u final derm recs  FEN: SLIV, Replete electrolytes PRN.      Dispo: continue inpatient management. Appreciating recommendations from IR, ID, pulmonology, and dermatology      DT[facility]as PGY2  Seen and evaluated with GYN ONC team

## 2024-06-06 NOTE — CONSULT NOTE ADULT - ATTENDING COMMENTS
Rash consistent with a drug exanthem, likely 2/2 Zosyn. There is no confirmatory test for this type of medication reaction. No evidence of a systemic hypersensitivity at this time. May use triamcinolone for symptomatic relief prn. Will continue to follow with you.
29 year old female POD 11 s/p ex-lap, TREVON, BSO, pelvic and para-aortic LND, omentectomy, appendectomy with initial frozen sections c/w carcinoma who now admitted with fevers, cough, noted to have multiple sub-4mm nodules on imaging for which pulmonary is consulted.     #Pulmonary nodules: CT imaging noted multiple sub 4mm nodules which are stable from prior scan. No other notable imaging findings  #Cough: Unclear etiology. Possibly post-viral cough from an illness she had 1 month ago.   #Fevers: Low suspicion of any underlying pulmonary infection, would look for alternative source of fever    Recommendations  - For her pulmonary nodules, she is a never-smoker and overall low risk for pulmonary malignancy. Not unreasonable to consider repeat scan for multiple subsolid nodules sub 6 mm in 3-6 months.   - No indication for bronchoscopy at this time  - Trial of cough suppression as above  - Agree with empiric antibiotics  - Rest per fellows note
30 yo woman with ovarian/ adnexal mass     s/p TREVON- BSO, LN dissection 5/21  frozen -- carcinoma   final path pending     returns with fever, SOB, pleuritic chest pain     in retrospect patient reports was having high fever prior to surgery     CT no PE, pulmonary nodules, hypoattenuating structure surrounding surgical clip along ligated ovarian vein     Fever related to infection vs malignancy     Suggestions above     follow blood cultures     pelvic US    c/w zosyn for now     in addition pulmonary eval     ID service will follow over weekend

## 2024-06-07 ENCOUNTER — APPOINTMENT (OUTPATIENT)
Dept: GYNECOLOGIC ONCOLOGY | Facility: CLINIC | Age: 29
End: 2024-06-07

## 2024-06-07 LAB
ALBUMIN SERPL ELPH-MCNC: 3.6 G/DL — SIGNIFICANT CHANGE UP (ref 3.3–5)
ALP SERPL-CCNC: 282 U/L — HIGH (ref 40–120)
ALT FLD-CCNC: 124 U/L — HIGH (ref 4–33)
ANION GAP SERPL CALC-SCNC: 14 MMOL/L — SIGNIFICANT CHANGE UP (ref 7–14)
AST SERPL-CCNC: 32 U/L — SIGNIFICANT CHANGE UP (ref 4–32)
BASOPHILS # BLD AUTO: 0.05 K/UL — SIGNIFICANT CHANGE UP (ref 0–0.2)
BASOPHILS NFR BLD AUTO: 0.4 % — SIGNIFICANT CHANGE UP (ref 0–2)
BILIRUB SERPL-MCNC: 0.3 MG/DL — SIGNIFICANT CHANGE UP (ref 0.2–1.2)
BUN SERPL-MCNC: 5 MG/DL — LOW (ref 7–23)
CALCIUM SERPL-MCNC: 9.3 MG/DL — SIGNIFICANT CHANGE UP (ref 8.4–10.5)
CHLORIDE SERPL-SCNC: 100 MMOL/L — SIGNIFICANT CHANGE UP (ref 98–107)
CO2 SERPL-SCNC: 24 MMOL/L — SIGNIFICANT CHANGE UP (ref 22–31)
CREAT SERPL-MCNC: 0.69 MG/DL — SIGNIFICANT CHANGE UP (ref 0.5–1.3)
CULTURE RESULTS: SIGNIFICANT CHANGE UP
CULTURE RESULTS: SIGNIFICANT CHANGE UP
EGFR: 119 ML/MIN/1.73M2 — SIGNIFICANT CHANGE UP
EOSINOPHIL # BLD AUTO: 0.8 K/UL — HIGH (ref 0–0.5)
EOSINOPHIL NFR BLD AUTO: 6.1 % — HIGH (ref 0–6)
GAMMA INTERFERON BACKGROUND BLD IA-ACNC: 0.06 IU/ML — SIGNIFICANT CHANGE UP
GLUCOSE SERPL-MCNC: 102 MG/DL — HIGH (ref 70–99)
HCT VFR BLD CALC: 32.3 % — LOW (ref 34.5–45)
HGB BLD-MCNC: 10.4 G/DL — LOW (ref 11.5–15.5)
IANC: 9.76 K/UL — HIGH (ref 1.8–7.4)
IMM GRANULOCYTES NFR BLD AUTO: 0.8 % — SIGNIFICANT CHANGE UP (ref 0–0.9)
LYMPHOCYTES # BLD AUTO: 1.79 K/UL — SIGNIFICANT CHANGE UP (ref 1–3.3)
LYMPHOCYTES # BLD AUTO: 13.6 % — SIGNIFICANT CHANGE UP (ref 13–44)
M TB IFN-G BLD-IMP: ABNORMAL
M TB IFN-G CD4+ BCKGRND COR BLD-ACNC: 0.01 IU/ML — SIGNIFICANT CHANGE UP
M TB IFN-G CD4+CD8+ BCKGRND COR BLD-ACNC: 0.01 IU/ML — SIGNIFICANT CHANGE UP
MAGNESIUM SERPL-MCNC: 2.1 MG/DL — SIGNIFICANT CHANGE UP (ref 1.6–2.6)
MCHC RBC-ENTMCNC: 27.2 PG — SIGNIFICANT CHANGE UP (ref 27–34)
MCHC RBC-ENTMCNC: 32.2 GM/DL — SIGNIFICANT CHANGE UP (ref 32–36)
MCV RBC AUTO: 84.6 FL — SIGNIFICANT CHANGE UP (ref 80–100)
MONOCYTES # BLD AUTO: 0.67 K/UL — SIGNIFICANT CHANGE UP (ref 0–0.9)
MONOCYTES NFR BLD AUTO: 5.1 % — SIGNIFICANT CHANGE UP (ref 2–14)
NEUTROPHILS # BLD AUTO: 9.76 K/UL — HIGH (ref 1.8–7.4)
NEUTROPHILS NFR BLD AUTO: 74 % — SIGNIFICANT CHANGE UP (ref 43–77)
NRBC # BLD: 0 /100 WBCS — SIGNIFICANT CHANGE UP (ref 0–0)
NRBC # FLD: 0 K/UL — SIGNIFICANT CHANGE UP (ref 0–0)
PHOSPHATE SERPL-MCNC: 3.7 MG/DL — SIGNIFICANT CHANGE UP (ref 2.5–4.5)
PLATELET # BLD AUTO: 603 K/UL — HIGH (ref 150–400)
POTASSIUM SERPL-MCNC: 4.1 MMOL/L — SIGNIFICANT CHANGE UP (ref 3.5–5.3)
POTASSIUM SERPL-SCNC: 4.1 MMOL/L — SIGNIFICANT CHANGE UP (ref 3.5–5.3)
PROT SERPL-MCNC: 7.1 G/DL — SIGNIFICANT CHANGE UP (ref 6–8.3)
QUANT TB PLUS MITOGEN MINUS NIL: 0.16 IU/ML — SIGNIFICANT CHANGE UP
RBC # BLD: 3.82 M/UL — SIGNIFICANT CHANGE UP (ref 3.8–5.2)
RBC # FLD: 13.4 % — SIGNIFICANT CHANGE UP (ref 10.3–14.5)
SODIUM SERPL-SCNC: 138 MMOL/L — SIGNIFICANT CHANGE UP (ref 135–145)
SPECIMEN SOURCE: SIGNIFICANT CHANGE UP
SPECIMEN SOURCE: SIGNIFICANT CHANGE UP
WBC # BLD: 13.18 K/UL — HIGH (ref 3.8–10.5)
WBC # FLD AUTO: 13.18 K/UL — HIGH (ref 3.8–10.5)

## 2024-06-07 PROCEDURE — 99232 SBSQ HOSP IP/OBS MODERATE 35: CPT

## 2024-06-07 PROCEDURE — 99233 SBSQ HOSP IP/OBS HIGH 50: CPT | Mod: GC

## 2024-06-07 PROCEDURE — 99231 SBSQ HOSP IP/OBS SF/LOW 25: CPT | Mod: 24,GC

## 2024-06-07 RX ORDER — IBUPROFEN 200 MG
600 TABLET ORAL ONCE
Refills: 0 | Status: DISCONTINUED | OUTPATIENT
Start: 2024-06-07 | End: 2024-06-07

## 2024-06-07 RX ORDER — POLYETHYLENE GLYCOL 3350 17 G/17G
17 POWDER, FOR SOLUTION ORAL
Refills: 0 | Status: DISCONTINUED | OUTPATIENT
Start: 2024-06-07 | End: 2024-06-11

## 2024-06-07 RX ORDER — BUDESONIDE AND FORMOTEROL FUMARATE DIHYDRATE 160; 4.5 UG/1; UG/1
2 AEROSOL RESPIRATORY (INHALATION)
Refills: 0 | Status: DISCONTINUED | OUTPATIENT
Start: 2024-06-07 | End: 2024-06-11

## 2024-06-07 RX ADMIN — APIXABAN 2.5 MILLIGRAM(S): 2.5 TABLET, FILM COATED ORAL at 18:27

## 2024-06-07 RX ADMIN — BENZOCAINE AND MENTHOL 1 LOZENGE: 5; 1 LIQUID ORAL at 18:27

## 2024-06-07 RX ADMIN — POLYETHYLENE GLYCOL 3350 17 GRAM(S): 17 POWDER, FOR SOLUTION ORAL at 11:11

## 2024-06-07 RX ADMIN — SIMETHICONE 80 MILLIGRAM(S): 80 TABLET, CHEWABLE ORAL at 06:41

## 2024-06-07 RX ADMIN — APIXABAN 2.5 MILLIGRAM(S): 2.5 TABLET, FILM COATED ORAL at 05:12

## 2024-06-07 RX ADMIN — Medication 100 MILLIGRAM(S): at 13:10

## 2024-06-07 RX ADMIN — BENZOCAINE AND MENTHOL 1 LOZENGE: 5; 1 LIQUID ORAL at 11:02

## 2024-06-07 RX ADMIN — BENZOCAINE AND MENTHOL 1 LOZENGE: 5; 1 LIQUID ORAL at 05:11

## 2024-06-07 RX ADMIN — Medication 100 MILLIGRAM(S): at 21:36

## 2024-06-07 RX ADMIN — Medication 200 MILLIGRAM(S): at 18:26

## 2024-06-07 RX ADMIN — Medication 1 APPLICATION(S): at 11:02

## 2024-06-07 RX ADMIN — PANTOPRAZOLE SODIUM 40 MILLIGRAM(S): 20 TABLET, DELAYED RELEASE ORAL at 06:36

## 2024-06-07 RX ADMIN — Medication 100 MILLIGRAM(S): at 05:12

## 2024-06-07 RX ADMIN — Medication 200 MILLIGRAM(S): at 06:36

## 2024-06-07 RX ADMIN — Medication 100 MILLIGRAM(S): at 13:05

## 2024-06-07 RX ADMIN — BENZOCAINE AND MENTHOL 1 LOZENGE: 5; 1 LIQUID ORAL at 21:37

## 2024-06-07 RX ADMIN — Medication 600 MILLIGRAM(S): at 13:05

## 2024-06-07 RX ADMIN — Medication 600 MILLIGRAM(S): at 14:00

## 2024-06-07 RX ADMIN — Medication 100 MILLIGRAM(S): at 21:37

## 2024-06-07 RX ADMIN — Medication 1 APPLICATION(S): at 21:36

## 2024-06-07 RX ADMIN — Medication 2 SPRAY(S): at 18:26

## 2024-06-07 RX ADMIN — BUDESONIDE AND FORMOTEROL FUMARATE DIHYDRATE 2 PUFF(S): 160; 4.5 AEROSOL RESPIRATORY (INHALATION) at 21:41

## 2024-06-07 RX ADMIN — Medication 3 MILLILITER(S): at 22:21

## 2024-06-07 RX ADMIN — Medication 100 MILLIGRAM(S): at 05:07

## 2024-06-07 NOTE — PROGRESS NOTE ADULT - ASSESSMENT
30 yo woman with ovarian/ adnexal mass     s/p TREVON- BSO, LN dissection 5/21  frozen -- carcinoma   path  endometrioid adenocarcinoma     returned 5/30 with fever, SOB, pleuritic chest pain     in retrospect patient reports was having high fever prior to surgery     CT no PE, pulmonary nodules, hypoattenuating structure surrounding surgical clip along ligated ovarian vein     blood cultures from 5/30 no growth   6/2 no growth     6/5  no growth to date     Repeat CT showed pelvic abscess   --  IR eval  too risky for aspiration     initially treated with zosyn     broadened to meropenem 5/3    developed diffuse body rash  - most likely due to zosyn      fever may be related to drug reaction     CT 6/5 smaller collections     LFTs and WBC elevation can also be due to zosyn allergic reaction     WBC now with eosinophils     LFTs trending down     rash fading on upper chest upper back; more obvious lower abdomen lower back     Suggest:      monitor abdominal exam     if any diarrhea check c dif     c/w  flagyl 500 mg iv q 8 h and cipro 400 mg iv q 12 h     trend LFTs       ID service will follow over weekend

## 2024-06-07 NOTE — CHART NOTE - NSCHARTNOTEFT_GEN_A_CORE
Patient seen and examined at bedside, feels subjectively febrile with chills. States having some abdominal discomfort after Maalox today. Patient did have a low grade temp earlier this afternoon.     Vital Signs Last 24 Hours  T(C): 38.3 (06-07-24 @ 13:00), Max: 38.3 (06-07-24 @ 13:00)  HR: 97 (06-07-24 @ 12:50) (79 - 100)  BP: 108/81 (06-07-24 @ 12:50) (94/36 - 120/60)  RR: 17 (06-07-24 @ 12:50) (16 - 18)  SpO2: 100% (06-07-24 @ 12:50) (98% - 100%)    I&O's Summary    06 Jun 2024 07:01  -  07 Jun 2024 07:00  --------------------------------------------------------  IN: 880 mL / OUT: 100 mL / NET: 780 mL        Physical Exam:  General: NAD  Lungs: breathing comfortably on RA  Abdomen: Soft, appropriately tender      Labs:             10.4<L>  13.18<H> )-----------( 603<H>    ( 06-07 @ 06:35 )             32.3<L>               9.7<L>  12.10<H> )-----------( 474<H>    ( 06-06 @ 05:35 )             30.5<L>               10.9<L>  14.16<H> )-----------( 491<H>    ( 06-05 @ 13:02 )             33.0<L>               10.4<L>  10.77<H> )-----------( 432<H>    ( 06-05 @ 05:30 )             32.2<L>               11.0<L>  8.81  )-----------( 414<H>    ( 06-04 @ 05:30 )             32.8<L>        MEDICATIONS  (STANDING):  albuterol    90 MICROgram(s) HFA Inhaler 2 Puff(s) Inhalation every 6 hours  albuterol/ipratropium for Nebulization 3 milliLiter(s) Nebulizer every 6 hours  apixaban 2.5 milliGRAM(s) Oral every 12 hours  benzocaine/menthol Lozenge 1 Lozenge Oral every 4 hours  benzonatate 100 milliGRAM(s) Oral every 8 hours  budesonide  80 MICROgram(s)/formoterol 4.5 MICROgram(s) Inhaler 2 Puff(s) Inhalation two times a day  ciprofloxacin   IVPB 400 milliGRAM(s) IV Intermittent every 12 hours  famotidine    Tablet 20 milliGRAM(s) Oral daily  fluticasone propionate 50 MICROgram(s)/spray Nasal Spray 2 Spray(s) Both Nostrils two times a day  guaiFENesin  milliGRAM(s) Oral every 12 hours  ibuprofen  Tablet. 600 milliGRAM(s) Oral every 6 hours  metroNIDAZOLE  IVPB 500 milliGRAM(s) IV Intermittent every 8 hours  metroNIDAZOLE  IVPB      pantoprazole    Tablet 40 milliGRAM(s) Oral before breakfast  polyethylene glycol 3350 17 Gram(s) Oral two times a day  triamcinolone 0.1% Ointment 1 Application(s) Topical every 12 hours    MEDICATIONS  (PRN):  diphenhydrAMINE 25 milliGRAM(s) Oral every 6 hours PRN cough  diphenhydrAMINE 25 milliGRAM(s) Oral every 6 hours PRN Rash and/or Itching  simethicone 80 milliGRAM(s) Chew every 6 hours PRN Gas    28 yo POD#17 s/p  Ex-Lap, TREVON, BSO, Pelvic and para-aortic LND, Omentectomy, Appendectomy, Frozen = Carcinoma. Patient was discharged on POD 4 with improved pain control and afebrile. At home she was in her usual state of health until 5/29 and presented to the ED on 5/30 in the setting of fevers of unknown origin. Patient recently post-op with URI symptoms. CTA neg for PE, but showing small pulmonary nodules. CT Abd/Pelvis reveals post-surgical changes with possible seroma in R adnexa. Patient febrile this afternoon for 101F and ID consulted. Zosyn stopped and Flagyl and Cipro started. Patient now endorses feeling better after dc of zosyn and Derm recommended steroid cream which was ordered for patient. Repeat CT chest and A/P 6/5 with findings of no PE and improvement in abdominal collection. Fever today 38.3 at 1pm today with patient having chills at time of evaluation.  -Will cw Flagyl and Cipro  -ID following   -Will monitor VS ovn for fevers  -cw motrin as antipyretic consider tylenol as LFTs are improving.    [facility] PGY2 Length To Time In Minutes Device Was In Place: 10

## 2024-06-07 NOTE — PROGRESS NOTE ADULT - ASSESSMENT
28 yo POD#17 s/p  Ex-Lap, TREVON, BSO, Pelvic and para-aortic LND, Omentectomy, Appendectomy, Frozen = Carcinoma. Patient was discharged on POD 4 with improved pain control and afebrile. At home she was in her usual state of health until 5/29 and presented to the ED on 5/30 in the setting of fevers of unknown origin. Patient recently post-op with URI symptoms. CTA neg for PE, but showing small pulmonary nodules. CT Abd/Pelvis reveals post-surgical changes with possible seroma in R adnexa. Patient febrile this afternoon for 101F and ID consulted. Zosyn stopped and Flagyl and Cipro started. Patient now endorses feeling better after dc of zosyn and Derm recommended steroid cream which was ordered for patient. Repeat CT chest and A/P with findings of no PE and improvement in abdominal collection. Last fever yesterday, 6/6 at 4p (38) In the AM patient doing better with no fevers and endorsing less cough overnight.     Neuro: PO ibuprofen PRN, holding tylenol in the setting of transaminitis  CV: Hemodynamically stable  Pulm: O2 sat WNL on RA. Continue encouraging IS use, cough improved  - throat lozenges for cough support  - CTA (6/5) with no PE, stable nodules  - CXR with clear lungs  - Pulm consulted, again she was started on pantoprazole 40 mg daily, duonebs q6, budesonide nebs 0.5 BID   - Repeat RVP panel(6/5) neg  GI: Tolerating regular diet  : Voiding spontaneously  - Patient with clear/yellow vaginal discharge, tampon test performed on 6/2 to evaluate for possible vesicovaginal fistula, negative. On VE by Dr. Lew fluctuance noted at the cuff concerning for possible collection  -  CTAP (6/2) A 7.0 x 2.5 x 2.4 cm abscess in the post hysterectomy bed. Re-demonstrated rim-enhancing fluid collection in the aortocaval space, consistent with postoperative fluid collection, possibly lymphocele, seroma, or abscess.  Heme: apixaban 2.5mg BID; SCDs while in bed  - CTAP (6/5): Hysterectomy. Interval decrease in size of the surgical bed rim-enhancing collection, which is now  into few smaller rim-enhancing loculated collections. One of the collections measures to 3.3 x 2.0 cm. Stable indeterminant 1.0 cm hypoattenuating hepatic focus.  ID: appreciate ID recommendations  - TMax 39.2(6/2@5p) and 6/4 at 5p, most recent fever 38 6/6 4p  - c/w IV Flagyl(6/5), IV Cipro(6/5-), pt afebrile ovn  - s/p zosyn (5/30 -6/3, 6/3-6/4), Meropenem(6/3-4), ID believes recent fevers and rash related to delayed rxn to beta lactam   - Leukocytosis trend 17->12.51->10.59->14->12  - Stat Bcx and Ucx from 6/5 obtained after last fever  - Procalcitonin 0.24, full viral panel neg, acute hepatitis panel, HIV, and MRSA swab neg, cyptococcal antigen neg  - UCx (5/30)-f: >3 specimen, previously growing with E faecalis   - RUQ sono (6/1): Centimeter sized central RIGHT hyperechoic lesion, Contracted gallbladder without stones. Possible millimeter size gallbladder mural polyp.  Likely noncontributory to fevers  - GGT (6/1): 266->371  - LLE dopplers (6/1) neg  - f/u urine histoplasma ag, QuantiFeron gold  - F/u BCx(5/30 and 6/2): NGTD  - Ucx(6/3)-NG-f  - IR Recs: Pt not a candidate for drainage 2/2 to poor window cw antibx, monitoring VS. IR reconsulted overnight to reassess for possible drainage  Skin: New onset rash. Benadryl PRN.  Derm recs appreciated; likely mobiliform drug related rash, steroid cream ordered with improvement of sxs  FEN: SLIV, Replete electrolytes PRN.      Dispo: continue inpatient management. Appreciating recommendations from IR, ID, pulmonology, and dermatology    Huyen Short, PGY-4   Seen with GYN ONC team

## 2024-06-07 NOTE — PROGRESS NOTE ADULT - SUBJECTIVE AND OBJECTIVE BOX
R4 [facility] Progress Note    POD#17   HD#9    Patient seen and examined at bedside.  No acute events overnight. No fevers overnight. Pt was able to sleep. Continues to report rash but states that it is less itchy. Reports continued serous vaginal drainage. Denies CP, SOB, N/V, and chills, bleeding    Vital Signs Last 24 Hours  T(C): 37.3 (06-07-24 @ 05:10), Max: 38 (06-06-24 @ 15:53)  HR: 91 (06-07-24 @ 05:10) (79 - 100)  BP: 97/60 (06-07-24 @ 05:10) (96/59 - 120/60)  RR: 18 (06-07-24 @ 05:10) (15 - 18)  SpO2: 100% (06-07-24 @ 05:10) (98% - 100%)    I&O's Summary    05 Jun 2024 07:01  -  06 Jun 2024 07:00  --------------------------------------------------------  IN: 1910 mL / OUT: 2400 mL / NET: -490 mL    06 Jun 2024 07:01  -  07 Jun 2024 06:57  --------------------------------------------------------  IN: 880 mL / OUT: 100 mL / NET: 780 mL        Physical Exam:  Constitutional: NAD, A+O x3  CV: RRR  Lungs: Clear to auscultation bilaterally  Abdomen: Soft, nondistended, no guarding or rebound tenderness.   Incision: Midline incision clean, dry, intact  Skin. Maculopapular rash slightly erythematous that extends from torso to lateral thigh  : No bleeding on pad  Extremities: No lower extremity edema     Labs:                        9.7    12.10 )-----------( 474      ( 06 Jun 2024 05:35 )             30.5   baso 0.2    eos 4.0    imm gran 1.0    lymph 18.7   mono 5.0    poly 71.1                         10.9   14.16 )-----------( 491      ( 05 Jun 2024 13:02 )             33.0   baso 0.2    eos 2.4    imm gran 0.6    lymph 10.8   mono 3.7    poly 82.3                         10.4   10.77 )-----------( 432      ( 05 Jun 2024 05:30 )             32.2   baso x      eos x      imm gran x      lymph x      mono x      poly x          MEDICATIONS  (STANDING):  albuterol    90 MICROgram(s) HFA Inhaler 2 Puff(s) Inhalation every 6 hours  albuterol/ipratropium for Nebulization 3 milliLiter(s) Nebulizer every 6 hours  apixaban 2.5 milliGRAM(s) Oral every 12 hours  benzocaine/menthol Lozenge 1 Lozenge Oral every 4 hours  benzonatate 100 milliGRAM(s) Oral every 8 hours  buDESOnide    Inhalation Suspension 0.5 milliGRAM(s) Inhalation every 12 hours  ciprofloxacin   IVPB 400 milliGRAM(s) IV Intermittent every 12 hours  famotidine    Tablet 20 milliGRAM(s) Oral daily  fluticasone propionate 50 MICROgram(s)/spray Nasal Spray 2 Spray(s) Both Nostrils two times a day  guaiFENesin  milliGRAM(s) Oral every 12 hours  ibuprofen  Tablet. 600 milliGRAM(s) Oral every 6 hours  metroNIDAZOLE  IVPB 500 milliGRAM(s) IV Intermittent every 8 hours  metroNIDAZOLE  IVPB      pantoprazole    Tablet 40 milliGRAM(s) Oral before breakfast  triamcinolone 0.1% Ointment 1 Application(s) Topical every 12 hours    MEDICATIONS  (PRN):  diphenhydrAMINE 25 milliGRAM(s) Oral every 6 hours PRN cough  diphenhydrAMINE 25 milliGRAM(s) Oral every 6 hours PRN Rash and/or Itching  simethicone 80 milliGRAM(s) Chew every 6 hours PRN Gas

## 2024-06-07 NOTE — PROGRESS NOTE ADULT - SUBJECTIVE AND OBJECTIVE BOX
Follow Up:      Inverval History/ROS:Patient is a 29y old  Female who presents with a chief complaint of post op fevers (01 Jun 2024 04:50)    c/o "gas pains"  feels constipated      poor appetite     Allergies    azithromycin (Hives)  Kiwi (Hives (Mild))    Intolerances        ANTIMICROBIALS:  ciprofloxacin   IVPB 400 every 12 hours  metroNIDAZOLE  IVPB 500 every 8 hours  metroNIDAZOLE  IVPB      Vital Signs Last 24 Hrs  T(F): 100.9 (06-07-24 @ 13:00), Max: 100.9 (06-07-24 @ 13:00)  HR: 97 (06-07-24 @ 12:50)  BP: 108/81 (06-07-24 @ 12:50)  RR: 17 (06-07-24 @ 12:50)  SpO2: 100% (06-07-24 @ 12:50) (98% - 100%)      PHYSICAL EXAM:  General:  uncomfortable   HEAD/EYES:  white sclera    Cardiovascular: s1s2  Respiratory:   clear   GI:   soft  :  midline wound   Neurologic: non-focal exam   Skin:  macular papular rash fading on upper back, chest  now more obvious on lower abdomen, lower back   Psychiatric:  [x ] appropriate affect [x ] alert & oriented                                     10.4   13.18 )-----------( 603      ( 07 Jun 2024 06:35 )             32.3 06-07    138  |  100  |  5   ----------------------------<  102  4.1   |  24  |  0.69  Ca    9.3      07 Jun 2024 06:35Phos  3.7     06-07Mg     2.10     06-07  TPro  7.1  /  Alb  3.6  /  TBili  0.3  /  DBili  x   /  AST  32  /  ALT  124  /  AlkPhos  282  06-07      Culture - Blood (06.05.24 @ 13:07)   Specimen Source: .Blood Blood-Arterial  Culture Results:   No growth at 24 hours      Culture - Blood (06.05.24 @ 13:31)   Specimen Source: .Blood Blood  Culture Results:   No growth at 24 hours      Culture - Fungal, Blood (06.05.24 @ 16:00)   Specimen Source: .Blood Blood  Culture Results:   Testing in progress  Culture - Urine (06.05.24 @ 16:21)   Specimen Source: Clean Catch Clean Catch (Midstream)  Culture Results:   No growth  Culture - Blood (06.02.24 @ 18:35)   Specimen Source: .Blood Blood  Culture Results:   No growth at 4 days   Culture - Blood (06.02.24 @ 18:15)   Specimen Source: .Blood Blood  Culture Results:   No growth at 4 days     Culture - Urine (05.30.24 @ 19:43)   Specimen Source: Clean Catch Clean Catch (Midstream)  Culture Results:   Culture grew 3 or more types of organisms which indicate   collection contamination; consider recollection only if clinically   indicated.    Culture - Blood (05.30.24 @ 17:26)   Specimen Source: .Blood Blood-Peripheral  Culture Results:   No growth       Culture - Blood (05.30.24 @ 17:15)   Specimen Source: .Blood Blood-Peripheral  Culture Results:   No growth   RADIOLOGY:    < from: CT Angio Chest PE Protocol w/ IV Cont (06.05.24 @ 17:39) >    ACC: [id number] EXAM:  CT ANGIO CHEST PULM ART WAWIC   ORDERED BY: CODY PAULA     ACC: [id number] EXAM:  CT ABDOMEN AND PELVIS IC   ORDERED BY: CODY PAULA     PROCEDURE DATE:  06/05/2024          INTERPRETATION:  CLINICAL INFORMATION: Postop. Fever. Pelvic abscess.   Follow-up study.    COMPARISON: CT abdomen pelvis 6/2/2024. CT chest abdomen pelvis 5/30/2024.    CONTRAST/COMPLICATIONS:  IV Contrast: Omnipaque 350   90 cc administered   10 cc discarded  Oral Contrast: NONE  Complications: None reported at time of study completion    PROCEDURE:  CT Angiography of the Chest was performed followed by portal venous phase   imaging of the Abdomen and Pelvis.  Sagittal and coronal reformats were performed as well as 3D (MIP)   reconstructions.    FINDINGS:  CHEST:  LUNGS AND LARGE AIRWAYS: Patent central airways. A 0.3 cm stable right   lower lobe pulmonary nodule (2/62).  PLEURA: No pleural effusion.  VESSELS: No pulmonary embolism.  HEART: Heart size is normal. No pericardial effusion.  MEDIASTINUM AND SANDHYA: No lymphadenopathy.  CHEST WALL AND LOWER NECK: Within normal limits.    ABDOMEN AND PELVIS:  LIVER: Normal hepatic morphology. Stable indeterminant 1.0 cm   hypoattenuating focus in the segment 8 (4/29).  BILE DUCTS: Normal caliber.  GALLBLADDER: Within normal limits.  SPLEEN: Within normal limits.  PANCREAS: Within normal limits.  ADRENALS: Within normal limits.  KIDNEYS/URETERS: Within normal limits.    BLADDER: Within normal limits.  REPRODUCTIVE ORGANS: Hysterectomy. Interval decrease in size of the   surgical bed rim-enhancing collection, which is now  into few   smaller rim-enhancing loculated collections. One of the collections   measures to 3.3 x 2.0 cm.    BOWEL: Mild mural thickening of the sigmoid colon adjacent to the   previously mentioned pelvic rim-enhancing collections, likely reactive.   Appendectomy. No bowel obstruction.  PERITONEUM: Small left paracolic gutter fluid. Diffuse mild peritoneal   thickening.  VESSELS: Within normal limits.  RETROPERITONEUM/LYMPH NODES: No lymphadenopathy. Interval decrease in   size of the aortocaval rim-enhancing collection measuring 2.2 x 1.1 cm,   previously 2.9 x 1.4 cm (remeasured).  ABDOMINAL WALL: Postsurgical changes.  BONES: Within normal limits.    IMPRESSION:  1.  No pulmonary embolism.  2.  Interval decrease in sizes of pelvic and retroperitoneal   rim-enhancing collections.  3.  Stable indeterminant 1.0 cm hypoattenuating hepatic focus in the   segment 8. Further evaluation with abdominal MR.          --- End of Report ---            JEANNIE PETTY MD; Attending Radiologist  This document has been electronically signed. Jun 5 2024  6:21PM    < end of copied text >

## 2024-06-07 NOTE — PROGRESS NOTE ADULT - TIME BILLING
Medical management as above, reviewing chart and coordinating care with primary team/staff, as well as reviewing vitals, radiology, medication list, recent labs, and prior records.    Does not include teaching time.

## 2024-06-07 NOTE — PROGRESS NOTE ADULT - ATTENDING COMMENTS
patient feeling better, c/o constipation  will start miralax daily  continue follow fever curve  continue abx, afebrile o vernight  collections decreased in size

## 2024-06-07 NOTE — PROGRESS NOTE ADULT - ATTENDING COMMENTS
Patient is a 30 yo F w/ recently diagnosed endometrioid adenocarcinoma, s/p recent ex-lap, TREVON, BSO, pelvic and para-aortic LND, omentectomy, appendectomy who was readmitted on  for recurrent fever as well as cough. Pulmonary reconsulted for persistent fevers.    CT notable for sub 4mm lung nodules that are stable from prior scan. Interval CT AP notable for 7 x 2.5 x 2.4 abscess in post hysterectomy bed    Repeat CT CAP overnight with stable chest exam, with interval decrease in abdominal collections    Cough improved    #Fevers - Likely source intraabdominal abscess. Lung nodules not source of fever  #Pulmonary nodules  #Cough - Differential includes cough variant asthma vs GERD vs post nasal drip vs URI. Improved with nebs  - c/w PPI, patient endorses cough in morning and known oropharyngeal/upper airway cobblestoning indicative of possible reflux. Family history of asthma  - Can start Symbicort 80 BID, rinse mouth after use (would discharge with Symbicort).  - Can d/c Pulmicort nebs  - c/w Duonebs q6h ATC for now, (would discharge with Albuterol PRN on discharge)  - Consider repeat scan for multiple subsolid nodules sub 6 mm in 3-6 months.   - Infectious workup as per primary team/ID  - Antitussive PRN  - Needs outpatient pulmonary follow up upon discharge at [address] [place] [address], [address] ([phone]).  Please e-mail [email or web address] to make an appointment for the patient.  Please include the name, , and a phone number for you and the patient)    Erich Barnett MD  Pulmonary & Critical Care

## 2024-06-07 NOTE — PROGRESS NOTE ADULT - SUBJECTIVE AND OBJECTIVE BOX
Interval Events:  - cough improved today  - reports increased abdominal pain and constipation    REVIEW OF SYSTEMS:  Negative except as documented above.      OBJECTIVE:  ICU Vital Signs Last 24 Hrs  T(C): 37.1 (07 Jun 2024 10:12), Max: 38 (06 Jun 2024 15:53)  T(F): 98.8 (07 Jun 2024 10:12), Max: 100.4 (06 Jun 2024 15:53)  HR: 87 (07 Jun 2024 10:12) (79 - 100)  BP: 94/36 (07 Jun 2024 10:12) (94/36 - 120/60)  BP(mean): --  ABP: --  ABP(mean): --  RR: 16 (07 Jun 2024 10:12) (15 - 18)  SpO2: 99% (07 Jun 2024 10:12) (98% - 100%)    O2 Parameters below as of 07 Jun 2024 10:12  Patient On (Oxygen Delivery Method): room air              06-06 @ 07:01  -  06-07 @ 07:00  --------------------------------------------------------  IN: 880 mL / OUT: 100 mL / NET: 780 mL      CAPILLARY BLOOD GLUCOSE          PHYSICAL EXAM:  General: NAD  HEENT:  EOMI, sclera anicteric, moist mucus membranes  Neck: supple  Cardiovascular: RRR  Respiratory: CTAB, no wheezes, crackles, or rhonci  Abdomen: soft, nontender  Extremities: warm and well perfused, no edema, no clubbing  Skin: no rashes  Neurological: no focal deficits    HOSPITAL MEDICATIONS:  MEDICATIONS  (STANDING):  albuterol    90 MICROgram(s) HFA Inhaler 2 Puff(s) Inhalation every 6 hours  albuterol/ipratropium for Nebulization 3 milliLiter(s) Nebulizer every 6 hours  apixaban 2.5 milliGRAM(s) Oral every 12 hours  benzocaine/menthol Lozenge 1 Lozenge Oral every 4 hours  benzonatate 100 milliGRAM(s) Oral every 8 hours  buDESOnide    Inhalation Suspension 0.5 milliGRAM(s) Inhalation every 12 hours  ciprofloxacin   IVPB 400 milliGRAM(s) IV Intermittent every 12 hours  famotidine    Tablet 20 milliGRAM(s) Oral daily  fluticasone propionate 50 MICROgram(s)/spray Nasal Spray 2 Spray(s) Both Nostrils two times a day  guaiFENesin  milliGRAM(s) Oral every 12 hours  ibuprofen  Tablet. 600 milliGRAM(s) Oral every 6 hours  metroNIDAZOLE  IVPB      metroNIDAZOLE  IVPB 500 milliGRAM(s) IV Intermittent every 8 hours  pantoprazole    Tablet 40 milliGRAM(s) Oral before breakfast  polyethylene glycol 3350 17 Gram(s) Oral two times a day  triamcinolone 0.1% Ointment 1 Application(s) Topical every 12 hours    MEDICATIONS  (PRN):  diphenhydrAMINE 25 milliGRAM(s) Oral every 6 hours PRN cough  diphenhydrAMINE 25 milliGRAM(s) Oral every 6 hours PRN Rash and/or Itching  simethicone 80 milliGRAM(s) Chew every 6 hours PRN Gas      LABS:                        10.4   13.18 )-----------( 603      ( 07 Jun 2024 06:35 )             32.3     Hgb Trend: 10.4<--, 9.7<--, 10.9<--, 10.4<--, 11.0<--  06-07    138  |  100  |  5<L>  ----------------------------<  102<H>  4.1   |  24  |  0.69    Ca    9.3      07 Jun 2024 06:35  Phos  3.7     06-07  Mg     2.10     06-07    TPro  7.1  /  Alb  3.6  /  TBili  0.3  /  DBili  x   /  AST  32  /  ALT  124<H>  /  AlkPhos  282<H>  06-07    Creatinine Trend: 0.69<--, 0.67<--, 0.61<--, 0.63<--, 0.61<--, 0.72<--    Urinalysis Basic - ( 07 Jun 2024 06:35 )    Color: x / Appearance: x / SG: x / pH: x  Gluc: 102 mg/dL / Ketone: x  / Bili: x / Urobili: x   Blood: x / Protein: x / Nitrite: x   Leuk Esterase: x / RBC: x / WBC x   Sq Epi: x / Non Sq Epi: x / Bacteria: x            MICROBIOLOGY:     Culture - Urine (collected 05 Jun 2024 16:21)  Source: Clean Catch Clean Catch (Midstream)  Final Report (06 Jun 2024 14:42):    No growth    Culture - Fungal, Blood (collected 05 Jun 2024 16:00)  Source: .Blood Blood  Preliminary Report (06 Jun 2024 09:42):    Testing in progress    Culture - Blood (collected 05 Jun 2024 13:31)  Source: .Blood Blood  Preliminary Report (06 Jun 2024 17:03):    No growth at 24 hours    Culture - Blood (collected 05 Jun 2024 13:07)  Source: .Blood Blood-Arterial  Preliminary Report (06 Jun 2024 17:03):    No growth at 24 hours        RADIOLOGY:  [x] Reviewed and interpreted by me

## 2024-06-07 NOTE — PROGRESS NOTE ADULT - ASSESSMENT
30 yo POD 9 Ex-Lap, TREVON, BSO, Pelvic and para-aortic LND, Omentectomy, Appendectomy, Frozen = Carcinoma, now returning with fevers, cough with pulmonary nodules present on CT chest.    #Abnormal CT chest  #Cough  #Fevers  CT chest from  with multiple sub 4mm B/L LL pulmonary nodules similar to CT from . No PE. Low suspicion that nodules are source of fever. No evidence of pneumonia.  Cough is intermittently productive, endorses some possible cough-variant asthma symptoms vs GERD. Has been using flonase without improvement in cough. Little improvement with cough suppressant therapy.  Has been on room air, lungs clear to auscultation.   - continue cough suppression with benzonatate 100mg TID   - continue flonase 2 sprays each nostril BID given some report of mild post nasal drip  - start empiric GERD treatment with pantoprazole 40 mg daily  - continue duonebs q6  - stop budensonide nebs, start symbicort 80-4.5 2 puffs BID, rinse mouth after use  - will monitor improvement in cough with above therapy  - abx per primary team, ID    Prior to discharge:  Please email: [email or web address] to setup an appointment prior to discharge. Please email on the day prior to anticipated discharge. The patient will be given a telehealth appointment in 1-2 days following discharge. Include the name, , hospital of discharge, expected date of discharge, diagnosis, and preferred phone number. Please include the date and time of the appointment on the patient's discharge summary and that the appointment is telehealth.    Pulmonary/[facility] Clinic  [address] [place] [address]  [phone]    Pulmonary to sign off. Please re-consult as needed.    Vianney Aguilar MD  Pulmonary and Critical Care Fellow

## 2024-06-08 LAB
ALBUMIN SERPL ELPH-MCNC: 3.4 G/DL — SIGNIFICANT CHANGE UP (ref 3.3–5)
ALP SERPL-CCNC: 239 U/L — HIGH (ref 40–120)
ALT FLD-CCNC: 85 U/L — HIGH (ref 4–33)
ANION GAP SERPL CALC-SCNC: 14 MMOL/L — SIGNIFICANT CHANGE UP (ref 7–14)
AST SERPL-CCNC: 24 U/L — SIGNIFICANT CHANGE UP (ref 4–32)
BASOPHILS # BLD AUTO: 0.04 K/UL — SIGNIFICANT CHANGE UP (ref 0–0.2)
BASOPHILS NFR BLD AUTO: 0.4 % — SIGNIFICANT CHANGE UP (ref 0–2)
BILIRUB SERPL-MCNC: 0.2 MG/DL — SIGNIFICANT CHANGE UP (ref 0.2–1.2)
BUN SERPL-MCNC: 9 MG/DL — SIGNIFICANT CHANGE UP (ref 7–23)
CALCIUM SERPL-MCNC: 9 MG/DL — SIGNIFICANT CHANGE UP (ref 8.4–10.5)
CHLORIDE SERPL-SCNC: 100 MMOL/L — SIGNIFICANT CHANGE UP (ref 98–107)
CO2 SERPL-SCNC: 24 MMOL/L — SIGNIFICANT CHANGE UP (ref 22–31)
CREAT SERPL-MCNC: 0.64 MG/DL — SIGNIFICANT CHANGE UP (ref 0.5–1.3)
EGFR: 121 ML/MIN/1.73M2 — SIGNIFICANT CHANGE UP
EOSINOPHIL # BLD AUTO: 0.67 K/UL — HIGH (ref 0–0.5)
EOSINOPHIL NFR BLD AUTO: 6.4 % — HIGH (ref 0–6)
GLUCOSE SERPL-MCNC: 96 MG/DL — SIGNIFICANT CHANGE UP (ref 70–99)
HCT VFR BLD CALC: 29.4 % — LOW (ref 34.5–45)
HGB BLD-MCNC: 9.6 G/DL — LOW (ref 11.5–15.5)
IANC: 7.1 K/UL — SIGNIFICANT CHANGE UP (ref 1.8–7.4)
IMM GRANULOCYTES NFR BLD AUTO: 0.9 % — SIGNIFICANT CHANGE UP (ref 0–0.9)
LYMPHOCYTES # BLD AUTO: 19.7 % — SIGNIFICANT CHANGE UP (ref 13–44)
LYMPHOCYTES # BLD AUTO: 2.08 K/UL — SIGNIFICANT CHANGE UP (ref 1–3.3)
MCHC RBC-ENTMCNC: 27.4 PG — SIGNIFICANT CHANGE UP (ref 27–34)
MCHC RBC-ENTMCNC: 32.7 GM/DL — SIGNIFICANT CHANGE UP (ref 32–36)
MCV RBC AUTO: 84 FL — SIGNIFICANT CHANGE UP (ref 80–100)
MONOCYTES # BLD AUTO: 0.55 K/UL — SIGNIFICANT CHANGE UP (ref 0–0.9)
MONOCYTES NFR BLD AUTO: 5.2 % — SIGNIFICANT CHANGE UP (ref 2–14)
NEUTROPHILS # BLD AUTO: 7.1 K/UL — SIGNIFICANT CHANGE UP (ref 1.8–7.4)
NEUTROPHILS NFR BLD AUTO: 67.4 % — SIGNIFICANT CHANGE UP (ref 43–77)
NRBC # BLD: 0 /100 WBCS — SIGNIFICANT CHANGE UP (ref 0–0)
NRBC # FLD: 0 K/UL — SIGNIFICANT CHANGE UP (ref 0–0)
PLATELET # BLD AUTO: 546 K/UL — HIGH (ref 150–400)
POTASSIUM SERPL-MCNC: 3.9 MMOL/L — SIGNIFICANT CHANGE UP (ref 3.5–5.3)
POTASSIUM SERPL-SCNC: 3.9 MMOL/L — SIGNIFICANT CHANGE UP (ref 3.5–5.3)
PROT SERPL-MCNC: 6.7 G/DL — SIGNIFICANT CHANGE UP (ref 6–8.3)
RBC # BLD: 3.5 M/UL — LOW (ref 3.8–5.2)
RBC # FLD: 13.4 % — SIGNIFICANT CHANGE UP (ref 10.3–14.5)
SODIUM SERPL-SCNC: 138 MMOL/L — SIGNIFICANT CHANGE UP (ref 135–145)
WBC # BLD: 10.54 K/UL — HIGH (ref 3.8–10.5)
WBC # FLD AUTO: 10.54 K/UL — HIGH (ref 3.8–10.5)

## 2024-06-08 PROCEDURE — 99232 SBSQ HOSP IP/OBS MODERATE 35: CPT

## 2024-06-08 RX ORDER — SENNA PLUS 8.6 MG/1
2 TABLET ORAL AT BEDTIME
Refills: 0 | Status: DISCONTINUED | OUTPATIENT
Start: 2024-06-08 | End: 2024-06-11

## 2024-06-08 RX ADMIN — Medication 2 SPRAY(S): at 18:23

## 2024-06-08 RX ADMIN — BENZOCAINE AND MENTHOL 1 LOZENGE: 5; 1 LIQUID ORAL at 06:48

## 2024-06-08 RX ADMIN — BENZOCAINE AND MENTHOL 1 LOZENGE: 5; 1 LIQUID ORAL at 18:18

## 2024-06-08 RX ADMIN — Medication 100 MILLIGRAM(S): at 21:50

## 2024-06-08 RX ADMIN — Medication 100 MILLIGRAM(S): at 13:38

## 2024-06-08 RX ADMIN — Medication 25 MILLIGRAM(S): at 23:28

## 2024-06-08 RX ADMIN — PANTOPRAZOLE SODIUM 40 MILLIGRAM(S): 20 TABLET, DELAYED RELEASE ORAL at 06:48

## 2024-06-08 RX ADMIN — Medication 100 MILLIGRAM(S): at 05:43

## 2024-06-08 RX ADMIN — BUDESONIDE AND FORMOTEROL FUMARATE DIHYDRATE 2 PUFF(S): 160; 4.5 AEROSOL RESPIRATORY (INHALATION) at 21:48

## 2024-06-08 RX ADMIN — Medication 100 MILLIGRAM(S): at 05:42

## 2024-06-08 RX ADMIN — POLYETHYLENE GLYCOL 3350 17 GRAM(S): 17 POWDER, FOR SOLUTION ORAL at 11:04

## 2024-06-08 RX ADMIN — Medication 1 APPLICATION(S): at 10:40

## 2024-06-08 RX ADMIN — BENZOCAINE AND MENTHOL 1 LOZENGE: 5; 1 LIQUID ORAL at 10:40

## 2024-06-08 RX ADMIN — BUDESONIDE AND FORMOTEROL FUMARATE DIHYDRATE 2 PUFF(S): 160; 4.5 AEROSOL RESPIRATORY (INHALATION) at 10:40

## 2024-06-08 RX ADMIN — Medication 1 APPLICATION(S): at 23:28

## 2024-06-08 RX ADMIN — APIXABAN 2.5 MILLIGRAM(S): 2.5 TABLET, FILM COATED ORAL at 05:43

## 2024-06-08 RX ADMIN — Medication 100 MILLIGRAM(S): at 21:48

## 2024-06-08 RX ADMIN — Medication 200 MILLIGRAM(S): at 06:49

## 2024-06-08 RX ADMIN — Medication 3 MILLILITER(S): at 12:02

## 2024-06-08 RX ADMIN — APIXABAN 2.5 MILLIGRAM(S): 2.5 TABLET, FILM COATED ORAL at 18:19

## 2024-06-08 RX ADMIN — Medication 200 MILLIGRAM(S): at 18:18

## 2024-06-08 RX ADMIN — BENZOCAINE AND MENTHOL 1 LOZENGE: 5; 1 LIQUID ORAL at 21:48

## 2024-06-08 RX ADMIN — SIMETHICONE 80 MILLIGRAM(S): 80 TABLET, CHEWABLE ORAL at 06:48

## 2024-06-08 RX ADMIN — BENZOCAINE AND MENTHOL 1 LOZENGE: 5; 1 LIQUID ORAL at 13:37

## 2024-06-08 NOTE — PROVIDER CONTACT NOTE (OTHER) - NAME OF MD/NP/PA/DO NOTIFIED:
MD Purcell
MD RONI Stephenson
FILIPE Garcia [id number]
Jonas Reyez MD
MD RONI Stephenson
MD Purcell
FILIPE Garcia [id number]
FILIPE Hugo # [id number]
MD LUCHO Llanos

## 2024-06-08 NOTE — PROVIDER CONTACT NOTE (OTHER) - BACKGROUND
Readmit for post op infection. s/p TREVON/BSO on 5/21
post-op fever. pmh eczema, UTI, upper respiratory infection
Patient admitted with fever (post op)
Patient admitted with fever (post op)
pt. admitted with fever , s/p TREVON, BSO , on 5/21
Admitted for fever post discharge from [facility]
Admitted for fever post TAHBSO
Admitted for fever post discharge AHBSO
S/P TREVON, BSO on 5/21, readmitted 5/30 for fever

## 2024-06-08 NOTE — PROGRESS NOTE ADULT - SUBJECTIVE AND OBJECTIVE BOX
GYN ONC Progress Note   POD#18  HD#10    Pt seen and examined at bedside. No overnight events. Afebrile overnight, though she had a fever to 38.3 yesterday afternoon. Pt without complaints. Pain well controlled on current regimen. Continues to report rash but states that it is less itchy. Reports continued serous vaginal drainage. She denies SOB/CP/palpitations, fever/chills, nausea/emesis.         Vital Signs Last 24 Hrs  T(C): 36.6 (08 Jun 2024 01:30), Max: 38.3 (07 Jun 2024 13:00)  T(F): 97.9 (08 Jun 2024 01:30), Max: 100.9 (07 Jun 2024 13:00)  HR: 87 (08 Jun 2024 01:30) (82 - 102)  BP: 95/59 (08 Jun 2024 01:30) (94/36 - 108/81)  BP(mean): --  RR: 18 (08 Jun 2024 01:30) (16 - 19)  SpO2: 100% (08 Jun 2024 01:30) (98% - 100%)    Parameters below as of 08 Jun 2024 01:30  Patient On (Oxygen Delivery Method): room air        06-06 @ 07:01  -  06-07 @ 07:00  --------------------------------------------------------  IN: 880 mL / OUT: 100 mL / NET: 780 mL    06-07 @ 07:01  -  06-08 @ 05:12  --------------------------------------------------------  IN: 340 mL / OUT: 800 mL / NET: -460 mL        PHYSICAL EXAM:  Constitutional: NAD, A+O x3  CV: RRR  Lungs: Clear to auscultation bilaterally  Abdomen: Soft, nondistended, no guarding or rebound tenderness.   Incision: Midline incision clean, dry, intact  Skin. Maculopapular rash slightly erythematous that extends from torso to lateral thigh  : No bleeding on pad  Extremities: No lower extremity edema     MEDICATIONS  (STANDING):  albuterol    90 MICROgram(s) HFA Inhaler 2 Puff(s) Inhalation every 6 hours  albuterol/ipratropium for Nebulization 3 milliLiter(s) Nebulizer every 6 hours  apixaban 2.5 milliGRAM(s) Oral every 12 hours  benzocaine/menthol Lozenge 1 Lozenge Oral every 4 hours  benzonatate 100 milliGRAM(s) Oral every 8 hours  budesonide  80 MICROgram(s)/formoterol 4.5 MICROgram(s) Inhaler 2 Puff(s) Inhalation two times a day  ciprofloxacin   IVPB 400 milliGRAM(s) IV Intermittent every 12 hours  famotidine    Tablet 20 milliGRAM(s) Oral daily  fluticasone propionate 50 MICROgram(s)/spray Nasal Spray 2 Spray(s) Both Nostrils two times a day  guaiFENesin  milliGRAM(s) Oral every 12 hours  ibuprofen  Tablet. 600 milliGRAM(s) Oral every 6 hours  metroNIDAZOLE  IVPB      metroNIDAZOLE  IVPB 500 milliGRAM(s) IV Intermittent every 8 hours  pantoprazole    Tablet 40 milliGRAM(s) Oral before breakfast  polyethylene glycol 3350 17 Gram(s) Oral two times a day  triamcinolone 0.1% Ointment 1 Application(s) Topical every 12 hours    MEDICATIONS  (PRN):  diphenhydrAMINE 25 milliGRAM(s) Oral every 6 hours PRN Rash and/or Itching  diphenhydrAMINE 25 milliGRAM(s) Oral every 6 hours PRN cough  simethicone 80 milliGRAM(s) Chew every 6 hours PRN Gas      Labs, additional tests:             10.4   13.18 )-----------( 603      ( 06-07 @ 06:35 )             32.3                9.7    12.10 )-----------( 474      ( 06-06 @ 05:35 )             30.5                10.9   14.16 )-----------( 491      ( 06-05 @ 13:02 )             33.0                10.4   10.77 )-----------( 432      ( 06-05 @ 05:30 )             32.2       06-07    138  |  100  |  5<L>  ----------------------------<  102<H>  4.1   |  24  |  0.69    Ca    9.3      07 Jun 2024 06:35  Phos  3.7     06-07  Mg     2.10     06-07    TPro  7.1  /  Alb  3.6  /  TBili  0.3  /  DBili  x   /  AST  32  /  ALT  124<H>  /  AlkPhos  282<H>  06-07       GYN ONC Progress Note   POD#18  HD#10    Pt seen and examined at bedside. No overnight events. Afebrile overnight, though she had a fever to 38.3 yesterday afternoon. Pt complaining of cough that she has had for many weeks. Pt also complaining of L groin pain unchanged from prior. She only feels pain with palpation. Pain otherwise well controlled on current regimen. Continues to report rash but states that it is less itchy. She thinks that it has receded somewhat. Reports continued serous vaginal drainage. She is passing flatus and had a bowel movement last night. She denies SOB/CP/palpitations, fever/chills, nausea/emesis.         Vital Signs Last 24 Hrs  T(C): 36.6 (08 Jun 2024 01:30), Max: 38.3 (07 Jun 2024 13:00)  T(F): 97.9 (08 Jun 2024 01:30), Max: 100.9 (07 Jun 2024 13:00)  HR: 87 (08 Jun 2024 01:30) (82 - 102)  BP: 95/59 (08 Jun 2024 01:30) (94/36 - 108/81)  BP(mean): --  RR: 18 (08 Jun 2024 01:30) (16 - 19)  SpO2: 100% (08 Jun 2024 01:30) (98% - 100%)    Parameters below as of 08 Jun 2024 01:30  Patient On (Oxygen Delivery Method): room air        06-06 @ 07:01 - 06-07 @ 07:00  --------------------------------------------------------  IN: 880 mL / OUT: 100 mL / NET: 780 mL    06-07 @ 07:01  -  06-08 @ 05:12  --------------------------------------------------------  IN: 340 mL / OUT: 800 mL / NET: -460 mL        PHYSICAL EXAM:  Constitutional: NAD, A+O x3  CV: RRR  Lungs: Clear to auscultation bilaterally  Abdomen: Soft, nondistended, no guarding or rebound tenderness.   Incision: Midline incision clean, dry, intact  Skin. Maculopapular rash slightly erythematous that extends from torso to bilateral thighs  : No bleeding on pad  Extremities: No lower extremity edema     MEDICATIONS  (STANDING):  albuterol    90 MICROgram(s) HFA Inhaler 2 Puff(s) Inhalation every 6 hours  albuterol/ipratropium for Nebulization 3 milliLiter(s) Nebulizer every 6 hours  apixaban 2.5 milliGRAM(s) Oral every 12 hours  benzocaine/menthol Lozenge 1 Lozenge Oral every 4 hours  benzonatate 100 milliGRAM(s) Oral every 8 hours  budesonide  80 MICROgram(s)/formoterol 4.5 MICROgram(s) Inhaler 2 Puff(s) Inhalation two times a day  ciprofloxacin   IVPB 400 milliGRAM(s) IV Intermittent every 12 hours  famotidine    Tablet 20 milliGRAM(s) Oral daily  fluticasone propionate 50 MICROgram(s)/spray Nasal Spray 2 Spray(s) Both Nostrils two times a day  guaiFENesin  milliGRAM(s) Oral every 12 hours  ibuprofen  Tablet. 600 milliGRAM(s) Oral every 6 hours  metroNIDAZOLE  IVPB      metroNIDAZOLE  IVPB 500 milliGRAM(s) IV Intermittent every 8 hours  pantoprazole    Tablet 40 milliGRAM(s) Oral before breakfast  polyethylene glycol 3350 17 Gram(s) Oral two times a day  triamcinolone 0.1% Ointment 1 Application(s) Topical every 12 hours    MEDICATIONS  (PRN):  diphenhydrAMINE 25 milliGRAM(s) Oral every 6 hours PRN Rash and/or Itching  diphenhydrAMINE 25 milliGRAM(s) Oral every 6 hours PRN cough  simethicone 80 milliGRAM(s) Chew every 6 hours PRN Gas      Labs, additional tests:             10.4   13.18 )-----------( 603      ( 06-07 @ 06:35 )             32.3                9.7    12.10 )-----------( 474      ( 06-06 @ 05:35 )             30.5                10.9   14.16 )-----------( 491      ( 06-05 @ 13:02 )             33.0                10.4   10.77 )-----------( 432      ( 06-05 @ 05:30 )             32.2       06-07    138  |  100  |  5<L>  ----------------------------<  102<H>  4.1   |  24  |  0.69    Ca    9.3      07 Jun 2024 06:35  Phos  3.7     06-07  Mg     2.10     06-07    TPro  7.1  /  Alb  3.6  /  TBili  0.3  /  DBili  x   /  AST  32  /  ALT  124<H>  /  AlkPhos  282<H>  06-07

## 2024-06-08 NOTE — PROVIDER CONTACT NOTE (OTHER) - SITUATION
Patient with c/o palpitations. Patient states nebulization treatment gives her palpitations and she does not want to take it.  BP 90/66, HR 94

## 2024-06-08 NOTE — PROGRESS NOTE ADULT - ASSESSMENT
29F with ovarian/ adnexal mass s/p s/p TREVON- BSO, LN dissection 5/21  frozen -- carcinoma; path endometrioid adenocarcinoma; returned 5/30 with fever, SOB, pleuritic chest pain.  Patient reports she was having fever prior to surgery and received a course of amoxicillin pre-op with resolution of fever.  Here, CT no PE, pulmonary nodules, hypoattenuating structure surrounding surgical clip along ligated ovarian vein, BC (-).  Repeat CT noted pelvic abscess  --  IR eval  too risky for aspiration.  Treated with zosyn, however, developed a rash with eosinophilia and increased LFT.  Antibiotics changed to cipro/flagyl.  Repeat CT improved collection.  Continued fevers (last 6/7).      Post-op collection with fever  - not amenable to IR drainage  - gyn to f/u with IR to see given repeat CT findings, if collection now amenable  - to continue cipro/flagyl  - if she has further fever, please repeat BC / UC  - feels sense of incomplete bladder emptying, please check post-void residual  - appreciate dermatology f/u    Please call the ID service [phone] with questions or concerns over the weekend

## 2024-06-08 NOTE — PROGRESS NOTE ADULT - SUBJECTIVE AND OBJECTIVE BOX
Patient is a 29y old  Female who presents with a chief complaint of post op fevers (01 Jun 2024 04:50)    f/u fever    Interval History/ROS:     PAST MEDICAL & SURGICAL HISTORY:  H/O pelvic mass  Ovarian cystic mass  Urinary tract infection  H/O upper respiratory infection  H/O eczema  History of keloid of skin    Allergies  azithromycin (Hives)  Kiwi (Hives (Mild))    ANTIMICROBIALS:   piperacillin/tazobactam IVPB (5/30-6/5)  meropenem  IVPB (6/4 x2 doses)    ciprofloxacin   IVPB 400 every 12 hours (6/5-)  metroNIDAZOLE  IVPB 500 every 8 hours (6/5-)    MEDICATIONS  (STANDING):  albuterol    90 MICROgram(s) HFA Inhaler 2 every 6 hours  albuterol/ipratropium for Nebulization 3 every 6 hours  apixaban 2.5 every 12 hours  benzonatate 100 every 8 hours  budesonide  80 MICROgram(s)/formoterol 4.5 MICROgram(s) Inhaler 2 two times a day  famotidine    Tablet 20 daily  guaiFENesin  every 12 hours  ibuprofen  Tablet. 600 every 6 hours  pantoprazole    Tablet 40 before breakfast  polyethylene glycol 3350 17 two times a day  senna 2 at bedtime    Vital Signs Last 24 Hrs  T(F): 98.4 (06-08-24 @ 05:45), Max: 100.9 (06-07-24 @ 13:00)  HR: 83 (06-08-24 @ 05:45)  BP: 107/57 (06-08-24 @ 05:45)  RR: 18 (06-08-24 @ 05:45)  SpO2: 99% (06-08-24 @ 05:45) (98% - 100%)  Wt(kg): --    PHYSICAL EXAM:                            9.6    10.54 )-----------( 546      ( 08 Jun 2024 05:23 )             29.4 06-08    138  |  100  |  9   ----------------------------<  96  3.9   |  24  |  0.64  Ca    9.0      08 Jun 2024 05:23Phos  3.7     06-07Mg     2.10     06-07  TPro  6.7  /  Alb  3.4  /  TBili  0.2  /  DBili  x   /  AST  24  /  ALT  85  /  AlkPhos  239  06-08    WBC Count: 10.54 (06-08-24 @ 05:23)  WBC Count: 13.18 (06-07-24 @ 06:35)  WBC Count: 12.10 (06-06-24 @ 05:35)  WBC Count: 14.16 (06-05-24 @ 13:02)  WBC Count: 10.77 (06-05-24 @ 05:30)  WBC Count: 8.81 (06-04-24 @ 05:30)  WBC Count: 9.64 (06-03-24 @ 05:58)  WBC Count: 8.71 (06-02-24 @ 18:11)  WBC Count: 9.27 (06-02-24 @ 05:38)  WBC Count: 10.59 (06-01-24 @ 05:46)  WBC Count: 12.51 (05-31-24 @ 05:31)  WBC Count: 17.54 (05-30-24 @ 17:46)    Auto Eosinophil %: 6.4 % (06-08-24 @ 05:23)  Auto Eosinophil %: 6.1 % (06-07-24 @ 06:35)  Auto Eosinophil %: 4.0 % (06-06-24 @ 05:35)  Auto Eosinophil %: 2.4 % (06-05-24 @ 13:02)  Auto Eosinophil %: 1.8 % (06-04-24 @ 05:30)  Auto Eosinophil %: 1.2 % (06-03-24 @ 05:58)  Auto Eosinophil %: 1.5 % (06-02-24 @ 18:11)  Auto Eosinophil %: 2.6 % (06-02-24 @ 05:38)  Auto Eosinophil %: 1.6 % (06-01-24 @ 05:46)  Auto Eosinophil %: 0.5 % (05-31-24 @ 05:31)    MICROBIOLOGY:  Culture - Urine (collected 06-05-24 @ 16:21)  Source: Clean Catch Clean Catch (Midstream)  Final Report (06-06-24 @ 14:42):    No growth    Culture - Fungal, Blood (collected 06-05-24 @ 16:00)  Source: .Blood Blood  Preliminary Report (06-06-24 @ 09:42):    Testing in progress    Culture - Blood (collected 06-05-24 @ 13:31)  Source: .Blood Blood  Preliminary Report (06-07-24 @ 17:02):    No growth at 48 Hours    Culture - Blood (collected 06-05-24 @ 13:07)  Source: .Blood Blood-Arterial  Preliminary Report (06-07-24 @ 17:02):    No growth at 48 Hours    Culture - Urine (collected 06-03-24 @ 19:03)  Source: Clean Catch Clean Catch (Midstream)  Final Report (06-04-24 @ 22:22):    No growth    Culture - Blood (collected 06-02-24 @ 18:35)  Source: .Blood Blood  Final Report (06-07-24 @ 21:00):    No growth at 5 days    Culture - Blood (collected 06-02-24 @ 18:15)  Source: .Blood Blood  Final Report (06-07-24 @ 22:00):    No growth at 5 days    Culture - Urine (collected 05-30-24 @ 19:43)  Source: Clean Catch Clean Catch (Midstream)  Final Report (06-02-24 @ 21:12):    Culture grew 3 or more types of organisms which indicate    collection contamination; consider recollection only if clinically    indicated.    Culture - Blood (collected 05-30-24 @ 17:26)  Source: .Blood Blood-Peripheral  Final Report (06-04-24 @ 22:01):    No growth at 5 days    Culture - Blood (collected 05-30-24 @ 17:15)  Source: .Blood Blood-Peripheral  Final Report (06-04-24 @ 22:01):    No growth at 5 days    RADIOLOGY:  CT Angio Chest PE Protocol w/ IV Cont (06.05.24 @ 17:39) >  IMPRESSION:  1.  No pulmonary embolism.  2.  Interval decrease in sizes of pelvic and retroperitoneal rim-enhancing collections.  3.  Stable indeterminant 1.0 cm hypoattenuating hepatic focus in the segment 8. Further evaluation with abdominal MR.    CT Abdomen and Pelvis w/ IV Cont (06.02.24 @ 21:30) >  IMPRESSION:  A 7.0 x 2.5 x 2.4 cm abscess in the post hysterectomy bed.  Redemonstrated rim-enhancing fluid collection in the aortocaval space, consistent with postoperative fluid collection, possibly lymphocele, seroma, or abscess.  Indeterminate right hepatic lesion, possibly hemangioma or metastasis (given the history of malignancy). Further evaluation with contrast-enhanced MRI abdomen is recommended.    CT Abdomen and Pelvis w/ IV Cont (05.30.24 @ 19:24) >  IMPRESSION:  No pulmonary embolism.  Status post hysterectomy and bilateral salpingo-oophorectomy. Small free fluid likely postsurgical without discrete rim-enhancing collection.  Hypoattenuating structure surrounding surgical clips along the course of the ligated right ovarian vein, differential for which includes seroma or lymphangioma versus thrombosis, which is considered less likely.   Patient is a 29y old  Female who presents with a chief complaint of post op fevers (01 Jun 2024 04:50)    f/u fever    Interval History/ROS:     PAST MEDICAL & SURGICAL HISTORY:  H/O pelvic mass  Ovarian cystic mass  Urinary tract infection  H/O upper respiratory infection  H/O eczema  History of keloid of skin    Allergies  azithromycin (Hives)  Kiwi (Hives (Mild))    ANTIMICROBIALS:   piperacillin/tazobactam IVPB (5/30-6/5)  meropenem  IVPB (6/4 x2 doses)    ciprofloxacin   IVPB 400 every 12 hours (6/5-)  metroNIDAZOLE  IVPB 500 every 8 hours (6/5-)    MEDICATIONS  (STANDING):  albuterol    90 MICROgram(s) HFA Inhaler 2 every 6 hours  albuterol/ipratropium for Nebulization 3 every 6 hours  apixaban 2.5 every 12 hours  benzonatate 100 every 8 hours  budesonide  80 MICROgram(s)/formoterol 4.5 MICROgram(s) Inhaler 2 two times a day  famotidine    Tablet 20 daily  guaiFENesin  every 12 hours  ibuprofen  Tablet. 600 every 6 hours  pantoprazole    Tablet 40 before breakfast  polyethylene glycol 3350 17 two times a day  senna 2 at bedtime    Vital Signs Last 24 Hrs  T(F): 98.4 (06-08-24 @ 05:45), Max: 100.9 (06-07-24 @ 13:00)  HR: 83 (06-08-24 @ 05:45)  BP: 107/57 (06-08-24 @ 05:45)  RR: 18 (06-08-24 @ 05:45)  SpO2: 99% (06-08-24 @ 05:45) (98% - 100%)  Wt(kg): --    PHYSICAL EXAM:  Constitutional: non-toxic  HEAD/EYES: anicteric  ENT:  supple  Cardiovascular:   not tachycardic  Respiratory: not tachypneic  GI:  soft, non-tender, normal bowel sounds; surgical incision c/d/i  :  no atkins  Musculoskeletal:  no synovitis  Neurologic: awake and alert, normal strength, no focal findings  Skin:  resolving rash b/l back / groin  Psychiatric:  awake, alert, appropriate mood                       9.6    10.54 )-----------( 546      ( 08 Jun 2024 05:23 )             29.4 06-08    138  |  100  |  9   ----------------------------<  96  3.9   |  24  |  0.64  Ca    9.0      08 Jun 2024 05:23Phos  3.7     06-07Mg     2.10     06-07  TPro  6.7  /  Alb  3.4  /  TBili  0.2  /  DBili  x   /  AST  24  /  ALT  85  /  AlkPhos  239  06-08    WBC Count: 10.54 (06-08-24 @ 05:23)  WBC Count: 13.18 (06-07-24 @ 06:35)  WBC Count: 12.10 (06-06-24 @ 05:35)  WBC Count: 14.16 (06-05-24 @ 13:02)  WBC Count: 10.77 (06-05-24 @ 05:30)  WBC Count: 8.81 (06-04-24 @ 05:30)  WBC Count: 9.64 (06-03-24 @ 05:58)  WBC Count: 8.71 (06-02-24 @ 18:11)  WBC Count: 9.27 (06-02-24 @ 05:38)  WBC Count: 10.59 (06-01-24 @ 05:46)  WBC Count: 12.51 (05-31-24 @ 05:31)  WBC Count: 17.54 (05-30-24 @ 17:46)    Auto Eosinophil %: 6.4 % (06-08-24 @ 05:23)  Auto Eosinophil %: 6.1 % (06-07-24 @ 06:35)  Auto Eosinophil %: 4.0 % (06-06-24 @ 05:35)  Auto Eosinophil %: 2.4 % (06-05-24 @ 13:02)  Auto Eosinophil %: 1.8 % (06-04-24 @ 05:30)  Auto Eosinophil %: 1.2 % (06-03-24 @ 05:58)  Auto Eosinophil %: 1.5 % (06-02-24 @ 18:11)  Auto Eosinophil %: 2.6 % (06-02-24 @ 05:38)  Auto Eosinophil %: 1.6 % (06-01-24 @ 05:46)  Auto Eosinophil %: 0.5 % (05-31-24 @ 05:31)    MICROBIOLOGY:  Culture - Urine (collected 06-05-24 @ 16:21)  Source: Clean Catch Clean Catch (Midstream)  Final Report (06-06-24 @ 14:42):    No growth    Culture - Fungal, Blood (collected 06-05-24 @ 16:00)  Source: .Blood Blood  Preliminary Report (06-06-24 @ 09:42):    Testing in progress    Culture - Blood (collected 06-05-24 @ 13:31)  Source: .Blood Blood  Preliminary Report (06-07-24 @ 17:02):    No growth at 48 Hours    Culture - Blood (collected 06-05-24 @ 13:07)  Source: .Blood Blood-Arterial  Preliminary Report (06-07-24 @ 17:02):    No growth at 48 Hours    Culture - Urine (collected 06-03-24 @ 19:03)  Source: Clean Catch Clean Catch (Midstream)  Final Report (06-04-24 @ 22:22):    No growth    Culture - Blood (collected 06-02-24 @ 18:35)  Source: .Blood Blood  Final Report (06-07-24 @ 21:00):    No growth at 5 days    Culture - Blood (collected 06-02-24 @ 18:15)  Source: .Blood Blood  Final Report (06-07-24 @ 22:00):    No growth at 5 days    Culture - Urine (collected 05-30-24 @ 19:43)  Source: Clean Catch Clean Catch (Midstream)  Final Report (06-02-24 @ 21:12):    Culture grew 3 or more types of organisms which indicate    collection contamination; consider recollection only if clinically    indicated.    Culture - Blood (collected 05-30-24 @ 17:26)  Source: .Blood Blood-Peripheral  Final Report (06-04-24 @ 22:01):    No growth at 5 days    Culture - Blood (collected 05-30-24 @ 17:15)  Source: .Blood Blood-Peripheral  Final Report (06-04-24 @ 22:01):    No growth at 5 days    RADIOLOGY:  CT Angio Chest PE Protocol w/ IV Cont (06.05.24 @ 17:39) >  IMPRESSION:  1.  No pulmonary embolism.  2.  Interval decrease in sizes of pelvic and retroperitoneal rim-enhancing collections.  3.  Stable indeterminant 1.0 cm hypoattenuating hepatic focus in the segment 8. Further evaluation with abdominal MR.    CT Abdomen and Pelvis w/ IV Cont (06.02.24 @ 21:30) >  IMPRESSION:  A 7.0 x 2.5 x 2.4 cm abscess in the post hysterectomy bed.  Redemonstrated rim-enhancing fluid collection in the aortocaval space, consistent with postoperative fluid collection, possibly lymphocele, seroma, or abscess.  Indeterminate right hepatic lesion, possibly hemangioma or metastasis (given the history of malignancy). Further evaluation with contrast-enhanced MRI abdomen is recommended.    CT Abdomen and Pelvis w/ IV Cont (05.30.24 @ 19:24) >  IMPRESSION:  No pulmonary embolism.  Status post hysterectomy and bilateral salpingo-oophorectomy. Small free fluid likely postsurgical without discrete rim-enhancing collection.  Hypoattenuating structure surrounding surgical clips along the course of the ligated right ovarian vein, differential for which includes seroma or lymphangioma versus thrombosis, which is considered less likely.

## 2024-06-08 NOTE — PROVIDER CONTACT NOTE (OTHER) - DATE AND TIME:
01-Jun-2024 10:00
06-Jun-2024 06:53
01-Jun-2024 19:10
08-Jun-2024 17:05
04-Jun-2024 19:15
01-Jun-2024 17:00
05-Jun-2024 05:50
07-Jun-2024 13:14
08-Jun-2024 14:30

## 2024-06-08 NOTE — PROGRESS NOTE ADULT - ASSESSMENT
28 yo POD#18 s/p  Ex-Lap, TREVON, BSO, Pelvic and para-aortic LND, Omentectomy, Appendectomy, Frozen = Carcinoma. Patient was discharged on POD 4 with improved pain control and afebrile. At home she was in her usual state of health until 5/29 and presented to the ED on 5/30 in the setting of fevers of unknown origin. Patient recently post-op with URI symptoms. CTA neg for PE, but showing small pulmonary nodules. CT Abd/Pelvis reveals post-surgical changes with possible seroma in R adnexa. Patient became febrile to 101F and ID consulted. Zosyn stopped and Flagyl and Cipro started. Derm recommended steroid cream which was ordered for patient. Repeat CT chest and A/P with findings of no PE and improvement in abdominal collection. Last fever yesterday, 6/7 at 1p (38.3). In the AM patient doing better with no fevers and endorsing less cough overnight.     Neuro: PO ibuprofen PRN, holding tylenol in the setting of transaminitis  CV: Hemodynamically stable  Pulm: O2 sat WNL on RA. Continue encouraging IS use, cough improved  - throat lozenges for cough support  - CTA (6/5) with no PE, stable nodules  - CXR with clear lungs  - Pulm consulted, again she was started on pantoprazole 40 mg daily, duonebs q6, budesonide nebs 0.5 BID   - Repeat RVP panel(6/5) neg  GI: Tolerating regular diet  : Voiding spontaneously  - Patient with clear/yellow vaginal discharge, tampon test performed on 6/2 to evaluate for possible vesicovaginal fistula, negative. On VE by Dr. Lew fluctuance noted at the cuff concerning for possible collection  -  CTAP (6/2) A 7.0 x 2.5 x 2.4 cm abscess in the post hysterectomy bed. Re-demonstrated rim-enhancing fluid collection in the aortocaval space, consistent with postoperative fluid collection, possibly lymphocele, seroma, or abscess.  Heme: apixaban 2.5mg BID; SCDs while in bed  - CTAP (6/5): Hysterectomy. Interval decrease in size of the surgical bed rim-enhancing collection, which is now  into few smaller rim-enhancing loculated collections. One of the collections measures to 3.3 x 2.0 cm. Stable indeterminant 1.0 cm hypoattenuating hepatic focus.  ID: appreciate ID recommendations  - TMax 39.2(6/2@5p) and 6/4 at 5p, most recent fever 38.3 6/7 1p  - c/w IV Flagyl(6/5), IV Cipro(6/5-), pt afebrile ovn  - s/p zosyn (5/30 -6/3, 6/3-6/4), Meropenem(6/3-4), ID believes recent fevers and rash related to delayed rxn to beta lactam   - Leukocytosis trend 17->12.51->10.59->14->12  - Stat Bcx and Ucx from 6/5 obtained after last fever  - Procalcitonin 0.24, full viral panel neg, acute hepatitis panel, HIV, and MRSA swab neg, cyptococcal antigen neg  - UCx (5/30)-f: >3 specimen, previously growing with E faecalis   - RUQ sono (6/1): Centimeter sized central RIGHT hyperechoic lesion, Contracted gallbladder without stones. Possible millimeter size gallbladder mural polyp.  Likely noncontributory to fevers  - GGT (6/1): 266->371  - LLE dopplers (6/1) neg  - f/u urine histoplasma ag, QuantiFeron gold  - F/u BCx(5/30 and 6/2): NGTD  - Ucx(6/3)-NG-f  - IR Recs: Pt not a candidate for drainage 2/2 to poor window cw antibx, monitoring VS. IR reconsulted overnight to reassess for possible drainage  Skin: New onset rash. Benadryl PRN.  Derm recs appreciated; likely mobiliform drug related rash, steroid cream ordered with improvement of sxs  FEN: SLIV, Replete electrolytes PRN.      Dispo: continue inpatient management. Appreciating recommendations from IR, ID, pulmonology, and dermatology    Gudelia Purcell PGY2

## 2024-06-09 LAB
ALBUMIN SERPL ELPH-MCNC: 3.5 G/DL — SIGNIFICANT CHANGE UP (ref 3.3–5)
ALP SERPL-CCNC: 218 U/L — HIGH (ref 40–120)
ALT FLD-CCNC: 65 U/L — HIGH (ref 4–33)
ANION GAP SERPL CALC-SCNC: 14 MMOL/L — SIGNIFICANT CHANGE UP (ref 7–14)
AST SERPL-CCNC: 19 U/L — SIGNIFICANT CHANGE UP (ref 4–32)
BILIRUB SERPL-MCNC: 0.2 MG/DL — SIGNIFICANT CHANGE UP (ref 0.2–1.2)
BLD GP AB SCN SERPL QL: NEGATIVE — SIGNIFICANT CHANGE UP
BUN SERPL-MCNC: 5 MG/DL — LOW (ref 7–23)
CALCIUM SERPL-MCNC: 9.1 MG/DL — SIGNIFICANT CHANGE UP (ref 8.4–10.5)
CHLORIDE SERPL-SCNC: 103 MMOL/L — SIGNIFICANT CHANGE UP (ref 98–107)
CO2 SERPL-SCNC: 22 MMOL/L — SIGNIFICANT CHANGE UP (ref 22–31)
CREAT SERPL-MCNC: 0.57 MG/DL — SIGNIFICANT CHANGE UP (ref 0.5–1.3)
EGFR: 125 ML/MIN/1.73M2 — SIGNIFICANT CHANGE UP
GLUCOSE SERPL-MCNC: 95 MG/DL — SIGNIFICANT CHANGE UP (ref 70–99)
HCT VFR BLD CALC: 31.5 % — LOW (ref 34.5–45)
HGB BLD-MCNC: 10.3 G/DL — LOW (ref 11.5–15.5)
MCHC RBC-ENTMCNC: 27.5 PG — SIGNIFICANT CHANGE UP (ref 27–34)
MCHC RBC-ENTMCNC: 32.7 GM/DL — SIGNIFICANT CHANGE UP (ref 32–36)
MCV RBC AUTO: 84.2 FL — SIGNIFICANT CHANGE UP (ref 80–100)
NRBC # BLD: 0 /100 WBCS — SIGNIFICANT CHANGE UP (ref 0–0)
NRBC # FLD: 0 K/UL — SIGNIFICANT CHANGE UP (ref 0–0)
PLATELET # BLD AUTO: 617 K/UL — HIGH (ref 150–400)
POTASSIUM SERPL-MCNC: 3.8 MMOL/L — SIGNIFICANT CHANGE UP (ref 3.5–5.3)
POTASSIUM SERPL-SCNC: 3.8 MMOL/L — SIGNIFICANT CHANGE UP (ref 3.5–5.3)
PROT SERPL-MCNC: 7 G/DL — SIGNIFICANT CHANGE UP (ref 6–8.3)
RBC # BLD: 3.74 M/UL — LOW (ref 3.8–5.2)
RBC # FLD: 13.4 % — SIGNIFICANT CHANGE UP (ref 10.3–14.5)
RH IG SCN BLD-IMP: POSITIVE — SIGNIFICANT CHANGE UP
RHEUMATOID FACT SERPL-ACNC: 10 IU/ML — SIGNIFICANT CHANGE UP (ref 0–13)
SODIUM SERPL-SCNC: 139 MMOL/L — SIGNIFICANT CHANGE UP (ref 135–145)
WBC # BLD: 12.41 K/UL — HIGH (ref 3.8–10.5)
WBC # FLD AUTO: 12.41 K/UL — HIGH (ref 3.8–10.5)

## 2024-06-09 RX ORDER — CIPROFLOXACIN LACTATE 400MG/40ML
500 VIAL (ML) INTRAVENOUS EVERY 12 HOURS
Refills: 0 | Status: DISCONTINUED | OUTPATIENT
Start: 2024-06-09 | End: 2024-06-11

## 2024-06-09 RX ORDER — METRONIDAZOLE 500 MG
500 TABLET ORAL ONCE
Refills: 0 | Status: COMPLETED | OUTPATIENT
Start: 2024-06-09 | End: 2024-06-09

## 2024-06-09 RX ORDER — METRONIDAZOLE 500 MG
500 TABLET ORAL EVERY 12 HOURS
Refills: 0 | Status: DISCONTINUED | OUTPATIENT
Start: 2024-06-09 | End: 2024-06-11

## 2024-06-09 RX ORDER — METRONIDAZOLE 500 MG
500 TABLET ORAL EVERY 8 HOURS
Refills: 0 | Status: DISCONTINUED | OUTPATIENT
Start: 2024-06-09 | End: 2024-06-09

## 2024-06-09 RX ADMIN — APIXABAN 2.5 MILLIGRAM(S): 2.5 TABLET, FILM COATED ORAL at 06:07

## 2024-06-09 RX ADMIN — BENZOCAINE AND MENTHOL 1 LOZENGE: 5; 1 LIQUID ORAL at 17:01

## 2024-06-09 RX ADMIN — Medication 100 MILLIGRAM(S): at 06:08

## 2024-06-09 RX ADMIN — BENZOCAINE AND MENTHOL 1 LOZENGE: 5; 1 LIQUID ORAL at 21:51

## 2024-06-09 RX ADMIN — Medication 1 APPLICATION(S): at 21:51

## 2024-06-09 RX ADMIN — Medication 100 MILLIGRAM(S): at 06:07

## 2024-06-09 RX ADMIN — Medication 100 MILLIGRAM(S): at 13:16

## 2024-06-09 RX ADMIN — Medication 500 MILLIGRAM(S): at 18:36

## 2024-06-09 RX ADMIN — Medication 100 MILLIGRAM(S): at 13:38

## 2024-06-09 RX ADMIN — SENNA PLUS 2 TABLET(S): 8.6 TABLET ORAL at 21:50

## 2024-06-09 RX ADMIN — PANTOPRAZOLE SODIUM 40 MILLIGRAM(S): 20 TABLET, DELAYED RELEASE ORAL at 06:07

## 2024-06-09 RX ADMIN — BENZOCAINE AND MENTHOL 1 LOZENGE: 5; 1 LIQUID ORAL at 06:08

## 2024-06-09 RX ADMIN — Medication 25 MILLIGRAM(S): at 22:00

## 2024-06-09 RX ADMIN — BUDESONIDE AND FORMOTEROL FUMARATE DIHYDRATE 2 PUFF(S): 160; 4.5 AEROSOL RESPIRATORY (INHALATION) at 21:50

## 2024-06-09 RX ADMIN — BENZOCAINE AND MENTHOL 1 LOZENGE: 5; 1 LIQUID ORAL at 09:20

## 2024-06-09 RX ADMIN — Medication 500 MILLIGRAM(S): at 21:50

## 2024-06-09 RX ADMIN — Medication 100 MILLIGRAM(S): at 21:52

## 2024-06-09 RX ADMIN — APIXABAN 2.5 MILLIGRAM(S): 2.5 TABLET, FILM COATED ORAL at 17:01

## 2024-06-09 RX ADMIN — Medication 200 MILLIGRAM(S): at 07:09

## 2024-06-09 RX ADMIN — Medication 1 APPLICATION(S): at 09:20

## 2024-06-09 NOTE — PROGRESS NOTE ADULT - ASSESSMENT
28 yo POD#19 s/p  Ex-Lap, TREVON, BSO, Pelvic and para-aortic LND, Omentectomy, Appendectomy, Frozen = Carcinoma. Patient was discharged on POD 4 with improved pain control and afebrile. At home she was in her usual state of health until 5/29 and presented to the ED on 5/30 in the setting of fevers of unknown origin. Patient recently post-op with URI symptoms. CTA neg for PE, but showing small pulmonary nodules. CT Abd/Pelvis reveals post-surgical changes with possible seroma in R adnexa. Patient became febrile to 101F and ID consulted. Zosyn stopped and Flagyl and Cipro started. Derm recommended steroid cream which was ordered for patient. Repeat CT chest and A/P with findings of no PE and improvement in abdominal collection. She has now been afebrile for >24h with last fever on 6/7 at 1p (38.3C).      Neuro: PO ibuprofen PRN, holding tylenol in the setting of transaminitis  CV: Hemodynamically stable  Pulm: O2 sat WNL on RA. Continue encouraging IS use, cough improved  - throat lozenges for cough support  - CTA (6/5) with no PE, stable nodules  - CXR with clear lungs  - Pulm consulted, again she was started on pantoprazole 40 mg daily, duonebs q6, budesonide nebs 0.5 BID   - Repeat RVP panel(6/5) neg  GI: Tolerating regular diet  : Voiding spontaneously  - Patient with clear/yellow vaginal discharge, tampon test performed on 6/2 to evaluate for possible vesicovaginal fistula, negative. On VE by Dr. Lew fluctuance noted at the cuff concerning for possible collection  -  CTAP (6/2) A 7.0 x 2.5 x 2.4 cm abscess in the post hysterectomy bed. Re-demonstrated rim-enhancing fluid collection in the aortocaval space, consistent with postoperative fluid collection, possibly lymphocele, seroma, or abscess.  Heme: apixaban 2.5mg BID; SCDs while in bed  - CTAP (6/5): Hysterectomy. Interval decrease in size of the surgical bed rim-enhancing collection, which is now  into few smaller rim-enhancing loculated collections. One of the collections measures to 3.3 x 2.0 cm. Stable indeterminant 1.0 cm hypoattenuating hepatic focus.  ID: appreciate ID recommendations  - TMax 39.2(6/2@5p) and 6/4 at 5p, most recent fever 38.3(6/7@1p)  - c/w IV Flagyl(6/5), IV Cipro(6/5-), pt afebrile ovn  - s/p zosyn (5/30 -6/3, 6/3-6/4), Meropenem(6/3-4), ID believes recent fevers and rash related to delayed rxn to beta lactam   - Leukocytosis trend 17->12.51->10.59->14->12  - Stat Bcx and Ucx from 6/5 obtained after last fever  - Procalcitonin 0.24, full viral panel neg, acute hepatitis panel, HIV, and MRSA swab neg, cyptococcal antigen neg  - UCx (5/30)-f: >3 specimen, previously growing with E faecalis   - RUQ sono (6/1): Centimeter sized central RIGHT hyperechoic lesion, Contracted gallbladder without stones. Possible millimeter size gallbladder mural polyp.  Likely noncontributory to fevers  - GGT (6/1): 266->371  - LLE dopplers (6/1) neg  - f/u urine histoplasma ag, QuantiFeron gold  - F/u BCx(5/30 and 6/2): NGTD  - Ucx(6/3)-NG-f  - IR Recs: Pt not a candidate for drainage 2/2 to poor window cw antibx, monitoring VS. IR reconsulted overnight to reassess for possible drainage  Skin: New onset rash. Benadryl PRN.  Derm recs appreciated; likely mobiliform drug related rash, steroid cream ordered with improvement of sxs  FEN: SLIV, Replete electrolytes PRN.      Dispo: continue inpatient management. Appreciating recommendations from IR, ID, pulmonology, and dermatology    Gudelia Purcell PGY2   30 yo POD#19 s/p  Ex-Lap, TREVON, BSO, Pelvic and para-aortic LND, Omentectomy, Appendectomy, Frozen = Carcinoma. Patient was discharged on POD 4 with improved pain control and afebrile. At home she was in her usual state of health until 5/29 and presented to the ED on 5/30 in the setting of fevers of unknown origin. Patient recently post-op with URI symptoms. CTA neg for PE, but showing small pulmonary nodules. CT Abd/Pelvis reveals post-surgical changes with possible seroma in R adnexa. Patient became febrile to 101F and ID consulted. Zosyn stopped and Flagyl and Cipro started. Derm recommended steroid cream which was ordered for patient. Repeat CT chest and A/P with findings of no PE and improvement in abdominal collection. She has now been afebrile for >24h with last fever on 6/7 at 1p (38.3C).      Neuro: PO ibuprofen PRN, holding tylenol in the setting of transaminitis  CV: Hemodynamically stable  Pulm: O2 sat WNL on RA. Continue encouraging IS use, cough improved  - throat lozenges for cough support  - CTA (6/5) with no PE, stable nodules  - CXR with clear lungs  - Pulm consulted, again she was started on pantoprazole 40 mg daily, duonebs q6, budesonide nebs 0.5 BID   - Repeat RVP panel(6/5) neg  GI: Tolerating regular diet  : Voiding spontaneously  - Patient with clear/yellow vaginal discharge, tampon test performed on 6/2 to evaluate for possible vesicovaginal fistula, negative. On VE by Dr. Lew fluctuance noted at the cuff concerning for possible collection  -  CTAP (6/2) A 7.0 x 2.5 x 2.4 cm abscess in the post hysterectomy bed. Re-demonstrated rim-enhancing fluid collection in the aortocaval space, consistent with postoperative fluid collection, possibly lymphocele, seroma, or abscess.  Heme: apixaban 2.5mg BID; SCDs while in bed  - CTAP (6/5): Hysterectomy. Interval decrease in size of the surgical bed rim-enhancing collection, which is now  into few smaller rim-enhancing loculated collections. One of the collections measures to 3.3 x 2.0 cm. Stable indeterminant 1.0 cm hypoattenuating hepatic focus.  ID: appreciate ID recommendations  - TMax 39.2(6/2@5p) and 6/4 at 5p, most recent fever 38.3(6/7@1p)  - c/w IV Flagyl(6/5), IV Cipro(6/5-), pt afebrile ovn  - s/p zosyn (5/30 -6/3, 6/3-6/4), Meropenem(6/3-4), ID believes recent fevers and rash related to delayed rxn to beta lactam   - Leukocytosis trend 17->12.51->10.59->14->12  - Stat Bcx and Ucx from 6/5 obtained after last fever  - Procalcitonin 0.24, full viral panel neg, acute hepatitis panel, HIV, and MRSA swab neg, cyptococcal antigen neg  - UCx (5/30)-f: >3 specimen, previously growing with E faecalis   - RUQ sono (6/1): Centimeter sized central RIGHT hyperechoic lesion, Contracted gallbladder without stones. Possible millimeter size gallbladder mural polyp.  Likely noncontributory to fevers  - GGT (6/1): 266->371  - LLE dopplers (6/1) neg  - QuantiFeron gold indeterminate  - f/u urine histoplasma ag  - F/u BCx(5/30 and 6/2): NGTD  - Ucx(6/3)-NG-f  - IR Recs: Pt not a candidate for drainage 2/2 to poor window cw antibx, monitoring VS. IR reconsulted overnight to reassess for possible drainage  Skin: New onset rash. Benadryl PRN.  Derm recs appreciated; likely mobiliform drug related rash, steroid cream ordered with improvement of sxs  FEN: SLIV, Replete electrolytes PRN.      Dispo: continue inpatient management. Appreciating recommendations from IR, ID, pulmonology, and dermatology    Gudelia Purcell PGY2

## 2024-06-09 NOTE — PROGRESS NOTE ADULT - SUBJECTIVE AND OBJECTIVE BOX
GYN ONC Progress Note   POD#19  HD#11    Pt seen and examined at bedside. No overnight events. Afebrile overnight. Pt continues to have cough, rash, and serous vaginal drainage unchanged from prior. She is passing flatus and having bowel movements. She denies SOB/CP/palpitations, fever/chills, nausea/emesis.       Vital Signs Last 24 Hrs  T(C): 37.2 (09 Jun 2024 01:16), Max: 37.3 (08 Jun 2024 17:31)  T(F): 98.9 (09 Jun 2024 01:16), Max: 99.2 (08 Jun 2024 17:31)  HR: 93 (09 Jun 2024 01:16) (80 - 113)  BP: 112/68 (09 Jun 2024 01:16) (90/66 - 112/68)  BP(mean): --  RR: 18 (09 Jun 2024 01:16) (18 - 18)  SpO2: 98% (09 Jun 2024 01:16) (98% - 100%)    Parameters below as of 09 Jun 2024 01:16  Patient On (Oxygen Delivery Method): room air        06-07 @ 07:01  -  06-08 @ 07:00  --------------------------------------------------------  IN: 760 mL / OUT: 1200 mL / NET: -440 mL    06-08 @ 07:01  -  06-09 @ 05:02  --------------------------------------------------------  IN: 1360 mL / OUT: 1800 mL / NET: -440 mL        PHYSICAL EXAM:  Constitutional: NAD, A+O x3  CV: RRR  Lungs: Clear to auscultation bilaterally  Abdomen: Soft, nondistended, no guarding or rebound tenderness.   Incision: Midline incision clean, dry, intact  Skin. Maculopapular rash slightly erythematous with blanching that extends from torso to bilateral thighs  : No bleeding on pad  Extremities: No lower extremity edema     MEDICATIONS  (STANDING):  albuterol    90 MICROgram(s) HFA Inhaler 2 Puff(s) Inhalation every 6 hours  albuterol/ipratropium for Nebulization 3 milliLiter(s) Nebulizer every 6 hours  apixaban 2.5 milliGRAM(s) Oral every 12 hours  benzocaine/menthol Lozenge 1 Lozenge Oral every 4 hours  benzonatate 100 milliGRAM(s) Oral every 8 hours  budesonide  80 MICROgram(s)/formoterol 4.5 MICROgram(s) Inhaler 2 Puff(s) Inhalation two times a day  ciprofloxacin   IVPB 400 milliGRAM(s) IV Intermittent every 12 hours  famotidine    Tablet 20 milliGRAM(s) Oral daily  fluticasone propionate 50 MICROgram(s)/spray Nasal Spray 2 Spray(s) Both Nostrils two times a day  guaiFENesin  milliGRAM(s) Oral every 12 hours  ibuprofen  Tablet. 600 milliGRAM(s) Oral every 6 hours  metroNIDAZOLE  IVPB      metroNIDAZOLE  IVPB 500 milliGRAM(s) IV Intermittent every 8 hours  pantoprazole    Tablet 40 milliGRAM(s) Oral before breakfast  polyethylene glycol 3350 17 Gram(s) Oral two times a day  senna 2 Tablet(s) Oral at bedtime  triamcinolone 0.1% Ointment 1 Application(s) Topical every 12 hours    MEDICATIONS  (PRN):  diphenhydrAMINE 25 milliGRAM(s) Oral every 6 hours PRN Rash and/or Itching  diphenhydrAMINE 25 milliGRAM(s) Oral every 6 hours PRN cough  simethicone 80 milliGRAM(s) Chew every 6 hours PRN Gas      Labs, additional tests:             9.6    10.54 )-----------( 546      ( 06-08 @ 05:23 )             29.4                10.4   13.18 )-----------( 603      ( 06-07 @ 06:35 )             32.3                9.7    12.10 )-----------( 474      ( 06-06 @ 05:35 )             30.5       06-08    138  |  100  |  9   ----------------------------<  96  3.9   |  24  |  0.64    Ca    9.0      08 Jun 2024 05:23  Phos  3.7     06-07  Mg     2.10     06-07    TPro  6.7  /  Alb  3.4  /  TBili  0.2  /  DBili  x   /  AST  24  /  ALT  85<H>  /  AlkPhos  239<H>  06-08

## 2024-06-10 LAB
ALBUMIN SERPL ELPH-MCNC: 3.4 G/DL — SIGNIFICANT CHANGE UP (ref 3.3–5)
ALP SERPL-CCNC: 198 U/L — HIGH (ref 40–120)
ALT FLD-CCNC: 52 U/L — HIGH (ref 4–33)
ANION GAP SERPL CALC-SCNC: 14 MMOL/L — SIGNIFICANT CHANGE UP (ref 7–14)
AST SERPL-CCNC: 19 U/L — SIGNIFICANT CHANGE UP (ref 4–32)
BASOPHILS # BLD AUTO: 0.08 K/UL — SIGNIFICANT CHANGE UP (ref 0–0.2)
BASOPHILS NFR BLD AUTO: 0.7 % — SIGNIFICANT CHANGE UP (ref 0–2)
BILIRUB SERPL-MCNC: <0.2 MG/DL — SIGNIFICANT CHANGE UP (ref 0.2–1.2)
BUN SERPL-MCNC: 8 MG/DL — SIGNIFICANT CHANGE UP (ref 7–23)
CALCIUM SERPL-MCNC: 9.3 MG/DL — SIGNIFICANT CHANGE UP (ref 8.4–10.5)
CHLORIDE SERPL-SCNC: 100 MMOL/L — SIGNIFICANT CHANGE UP (ref 98–107)
CO2 SERPL-SCNC: 23 MMOL/L — SIGNIFICANT CHANGE UP (ref 22–31)
CREAT SERPL-MCNC: 0.64 MG/DL — SIGNIFICANT CHANGE UP (ref 0.5–1.3)
CULTURE RESULTS: SIGNIFICANT CHANGE UP
CULTURE RESULTS: SIGNIFICANT CHANGE UP
EGFR: 121 ML/MIN/1.73M2 — SIGNIFICANT CHANGE UP
EOSINOPHIL # BLD AUTO: 0.78 K/UL — HIGH (ref 0–0.5)
EOSINOPHIL NFR BLD AUTO: 6.5 % — HIGH (ref 0–6)
GLUCOSE SERPL-MCNC: 87 MG/DL — SIGNIFICANT CHANGE UP (ref 70–99)
HCT VFR BLD CALC: 31.6 % — LOW (ref 34.5–45)
HGB BLD-MCNC: 10.4 G/DL — LOW (ref 11.5–15.5)
IANC: 7.52 K/UL — HIGH (ref 1.8–7.4)
IMM GRANULOCYTES NFR BLD AUTO: 1.8 % — HIGH (ref 0–0.9)
LYMPHOCYTES # BLD AUTO: 2.75 K/UL — SIGNIFICANT CHANGE UP (ref 1–3.3)
LYMPHOCYTES # BLD AUTO: 23.1 % — SIGNIFICANT CHANGE UP (ref 13–44)
MCHC RBC-ENTMCNC: 27.4 PG — SIGNIFICANT CHANGE UP (ref 27–34)
MCHC RBC-ENTMCNC: 32.9 GM/DL — SIGNIFICANT CHANGE UP (ref 32–36)
MCV RBC AUTO: 83.4 FL — SIGNIFICANT CHANGE UP (ref 80–100)
MONOCYTES # BLD AUTO: 0.56 K/UL — SIGNIFICANT CHANGE UP (ref 0–0.9)
MONOCYTES NFR BLD AUTO: 4.7 % — SIGNIFICANT CHANGE UP (ref 2–14)
NEUTROPHILS # BLD AUTO: 7.52 K/UL — HIGH (ref 1.8–7.4)
NEUTROPHILS NFR BLD AUTO: 63.2 % — SIGNIFICANT CHANGE UP (ref 43–77)
NRBC # BLD: 0 /100 WBCS — SIGNIFICANT CHANGE UP (ref 0–0)
NRBC # FLD: 0 K/UL — SIGNIFICANT CHANGE UP (ref 0–0)
PLATELET # BLD AUTO: 627 K/UL — HIGH (ref 150–400)
POTASSIUM SERPL-MCNC: 3.8 MMOL/L — SIGNIFICANT CHANGE UP (ref 3.5–5.3)
POTASSIUM SERPL-SCNC: 3.8 MMOL/L — SIGNIFICANT CHANGE UP (ref 3.5–5.3)
PROT SERPL-MCNC: 6.8 G/DL — SIGNIFICANT CHANGE UP (ref 6–8.3)
RBC # BLD: 3.79 M/UL — LOW (ref 3.8–5.2)
RBC # FLD: 13.6 % — SIGNIFICANT CHANGE UP (ref 10.3–14.5)
SODIUM SERPL-SCNC: 137 MMOL/L — SIGNIFICANT CHANGE UP (ref 135–145)
SPECIMEN SOURCE: SIGNIFICANT CHANGE UP
SPECIMEN SOURCE: SIGNIFICANT CHANGE UP
WBC # BLD: 11.91 K/UL — HIGH (ref 3.8–10.5)
WBC # FLD AUTO: 11.91 K/UL — HIGH (ref 3.8–10.5)

## 2024-06-10 PROCEDURE — 99232 SBSQ HOSP IP/OBS MODERATE 35: CPT

## 2024-06-10 RX ADMIN — Medication 500 MILLIGRAM(S): at 18:04

## 2024-06-10 RX ADMIN — BUDESONIDE AND FORMOTEROL FUMARATE DIHYDRATE 2 PUFF(S): 160; 4.5 AEROSOL RESPIRATORY (INHALATION) at 21:30

## 2024-06-10 RX ADMIN — APIXABAN 2.5 MILLIGRAM(S): 2.5 TABLET, FILM COATED ORAL at 17:21

## 2024-06-10 RX ADMIN — Medication 500 MILLIGRAM(S): at 21:30

## 2024-06-10 RX ADMIN — PANTOPRAZOLE SODIUM 40 MILLIGRAM(S): 20 TABLET, DELAYED RELEASE ORAL at 06:50

## 2024-06-10 RX ADMIN — BENZOCAINE AND MENTHOL 1 LOZENGE: 5; 1 LIQUID ORAL at 13:22

## 2024-06-10 RX ADMIN — Medication 500 MILLIGRAM(S): at 06:50

## 2024-06-10 RX ADMIN — Medication 100 MILLIGRAM(S): at 05:56

## 2024-06-10 RX ADMIN — Medication 100 MILLIGRAM(S): at 13:22

## 2024-06-10 RX ADMIN — Medication 100 MILLIGRAM(S): at 21:30

## 2024-06-10 RX ADMIN — BENZOCAINE AND MENTHOL 1 LOZENGE: 5; 1 LIQUID ORAL at 09:02

## 2024-06-10 RX ADMIN — Medication 500 MILLIGRAM(S): at 08:55

## 2024-06-10 RX ADMIN — BENZOCAINE AND MENTHOL 1 LOZENGE: 5; 1 LIQUID ORAL at 21:29

## 2024-06-10 RX ADMIN — Medication 1 APPLICATION(S): at 08:51

## 2024-06-10 RX ADMIN — APIXABAN 2.5 MILLIGRAM(S): 2.5 TABLET, FILM COATED ORAL at 05:56

## 2024-06-10 RX ADMIN — BENZOCAINE AND MENTHOL 1 LOZENGE: 5; 1 LIQUID ORAL at 05:57

## 2024-06-10 RX ADMIN — Medication 1 APPLICATION(S): at 21:31

## 2024-06-10 NOTE — CHART NOTE - NSCHARTNOTEFT_GEN_A_CORE
R1 Gyn ONC PM Rounds POD#19, HD#12    Subjective:   Pt seen and examined at bedside. No events today. Patient seen ambulating down the hallway with mom. Pt states pain well controlled. Pt reports passing flatus, +BM today. Tolerating regular diet. Pt denies fever, chills, chest pain, SOB, nausea, vomiting, lightheadedness, dizziness.      Objective:  T(F): 98.4 (06-10-24 @ 13:29), Max: 98.6 (06-10-24 @ 01:45)  HR: 79 (06-10-24 @ 13:29) (71 - 95)  BP: 102/74 (06-10-24 @ 13:29) (92/55 - 105/62)  RR: 18 (06-10-24 @ 13:29) (15 - 18)  SpO2: 99% (06-10-24 @ 13:29) (99% - 100%)  Wt(kg): --  I&O's Summary    09 Jun 2024 07:01  -  10 Bao 2024 07:00  --------------------------------------------------------  IN: 480 mL / OUT: 1600 mL / NET: -1120 mL    10 Bao 2024 07:01  -  10 Bao 2024 16:29  --------------------------------------------------------  IN: 480 mL / OUT: 1250 mL / NET: -770 mL      CAPILLARY BLOOD GLUCOSE          MEDICATIONS  (STANDING):  albuterol    90 MICROgram(s) HFA Inhaler 2 Puff(s) Inhalation every 6 hours  albuterol/ipratropium for Nebulization 3 milliLiter(s) Nebulizer every 6 hours  apixaban 2.5 milliGRAM(s) Oral every 12 hours  benzocaine/menthol Lozenge 1 Lozenge Oral every 4 hours  benzonatate 100 milliGRAM(s) Oral every 8 hours  budesonide  80 MICROgram(s)/formoterol 4.5 MICROgram(s) Inhaler 2 Puff(s) Inhalation two times a day  ciprofloxacin     Tablet 500 milliGRAM(s) Oral every 12 hours  famotidine    Tablet 20 milliGRAM(s) Oral daily  fluticasone propionate 50 MICROgram(s)/spray Nasal Spray 2 Spray(s) Both Nostrils two times a day  guaiFENesin  milliGRAM(s) Oral every 12 hours  ibuprofen  Tablet. 600 milliGRAM(s) Oral every 6 hours  metroNIDAZOLE    Tablet 500 milliGRAM(s) Oral every 12 hours  pantoprazole    Tablet 40 milliGRAM(s) Oral before breakfast  polyethylene glycol 3350 17 Gram(s) Oral two times a day  senna 2 Tablet(s) Oral at bedtime  triamcinolone 0.1% Ointment 1 Application(s) Topical every 12 hours    MEDICATIONS  (PRN):  diphenhydrAMINE 25 milliGRAM(s) Oral every 6 hours PRN Rash and/or Itching  diphenhydrAMINE 25 milliGRAM(s) Oral every 6 hours PRN cough  simethicone 80 milliGRAM(s) Chew every 6 hours PRN Gas      PHYSICAL EXAM:  Constitutional: NAD, A+O x3  CV: RRR  HEENT: Small 1cm mobile submandibular lymph node; slightly painful to touch.   Lungs: Clear to auscultation bilaterally  Abdomen: Soft, nondistended, no guarding or rebound tenderness.   Incision: Midline incision clean, dry, intact  Skin. Maculopapular rash slightly erythematous that extends from torso to bilateral thighs with much improvement and less erythema or irritation   : No bleeding on pad, minimal discharge  Extremities: No lower extremity edema       LABS:               10.4   11.91 )-----------( 627      ( 06-10 @ 05:40 )             31.6                10.3   12.41 )-----------( 617      ( 06-09 @ 05:25 )             31.5                9.6    10.54 )-----------( 546      ( 06-08 @ 05:23 )             29.4       06-10    137    |  100    |  8      ----------------------------<  87     3.8     |  23     |  0.64   06-09    139    |  103    |  5<L>   ----------------------------<  95     3.8     |  22     |  0.57     Ca    9.3        10 Bao 2024 05:40  Ca    9.1        09 Jun 2024 05:25    TPro  6.8    /  Alb  3.4    /  TBili  <0.2   /  DBili  x      /  AST  19     /  ALT  52<H>  /  AlkPhos  198<H>  06-10  TPro  7.0    /  Alb  3.5    /  TBili  0.2    /  DBili  x      /  AST  19     /  ALT  65<H>  /  AlkPhos  218<H>  06-09          Urinalysis Basic - ( 10 Bao 2024 05:40 )    Color: x / Appearance: x / SG: x / pH: x  Gluc: 87 mg/dL / Ketone: x  / Bili: x / Urobili: x   Blood: x / Protein: x / Nitrite: x   Leuk Esterase: x / RBC: x / WBC x   Sq Epi: x / Non Sq Epi: x / Bacteria: x        Assessment/Plan: 30 yo POD#19 s/p  Ex-Lap, TREVON, BSO, Pelvic and para-aortic LND, Omentectomy, Appendectomy, Frozen = Carcinoma. Patient was discharged on POD 4 with improved pain control and afebrile. At home she was in her usual state of health until 5/29 and presented to the ED on 5/30 in the setting of fevers of unknown origin. Patient recently post-op with URI symptoms. CTA neg for PE, but showing small pulmonary nodules. CT Abd/Pelvis reveals post-surgical changes with possible seroma in R adnexa. Patient became febrile to 101F and ID consulted. Zosyn stopped and Flagyl and Cipro started. Derm recommended steroid cream which was ordered for patient. Repeat CT chest and A/P with findings of no PE and improvement in abdominal collection. Last fever yesterday, 6/7 at 1p (38.3). Pt hemodynamically stable, afebrile, improving clinically     - Continue PO pain medication  - Rash improving with steroid cream  - URI symptoms improving   - voiding spontaneously, tolerating Reg diet  - continue Apixaban, and SCDs when not ambulating  - continue w/ cipro/flagyl  - monitor VS  - anticipate discharge tomorrow     Senait Falodun PGY1  Seen with Gyn Onc team. R1 Gyn ONC PM Rounds POD#19, HD#12    Subjective:   Pt seen and examined at bedside. No events today. Patient seen ambulating down the hallway with mom. Pt states pain well controlled. Pt reports passing flatus, +BM today. Tolerating regular diet. Pt denies fever, chills, chest pain, SOB, nausea, vomiting, lightheadedness, dizziness.      Objective:  T(F): 98.4 (06-10-24 @ 13:29), Max: 98.6 (06-10-24 @ 01:45)  HR: 79 (06-10-24 @ 13:29) (71 - 95)  BP: 102/74 (06-10-24 @ 13:29) (92/55 - 105/62)  RR: 18 (06-10-24 @ 13:29) (15 - 18)  SpO2: 99% (06-10-24 @ 13:29) (99% - 100%)  Wt(kg): --  I&O's Summary    09 Jun 2024 07:01  -  10 Bao 2024 07:00  --------------------------------------------------------  IN: 480 mL / OUT: 1600 mL / NET: -1120 mL    10 Bao 2024 07:01  -  10 Bao 2024 16:29  --------------------------------------------------------  IN: 480 mL / OUT: 1250 mL / NET: -770 mL      CAPILLARY BLOOD GLUCOSE          MEDICATIONS  (STANDING):  albuterol    90 MICROgram(s) HFA Inhaler 2 Puff(s) Inhalation every 6 hours  albuterol/ipratropium for Nebulization 3 milliLiter(s) Nebulizer every 6 hours  apixaban 2.5 milliGRAM(s) Oral every 12 hours  benzocaine/menthol Lozenge 1 Lozenge Oral every 4 hours  benzonatate 100 milliGRAM(s) Oral every 8 hours  budesonide  80 MICROgram(s)/formoterol 4.5 MICROgram(s) Inhaler 2 Puff(s) Inhalation two times a day  ciprofloxacin     Tablet 500 milliGRAM(s) Oral every 12 hours  famotidine    Tablet 20 milliGRAM(s) Oral daily  fluticasone propionate 50 MICROgram(s)/spray Nasal Spray 2 Spray(s) Both Nostrils two times a day  guaiFENesin  milliGRAM(s) Oral every 12 hours  ibuprofen  Tablet. 600 milliGRAM(s) Oral every 6 hours  metroNIDAZOLE    Tablet 500 milliGRAM(s) Oral every 12 hours  pantoprazole    Tablet 40 milliGRAM(s) Oral before breakfast  polyethylene glycol 3350 17 Gram(s) Oral two times a day  senna 2 Tablet(s) Oral at bedtime  triamcinolone 0.1% Ointment 1 Application(s) Topical every 12 hours    MEDICATIONS  (PRN):  diphenhydrAMINE 25 milliGRAM(s) Oral every 6 hours PRN Rash and/or Itching  diphenhydrAMINE 25 milliGRAM(s) Oral every 6 hours PRN cough  simethicone 80 milliGRAM(s) Chew every 6 hours PRN Gas      PHYSICAL EXAM:  Constitutional: NAD, A+O x3  CV: RRR  HEENT: Small 1cm mobile submandibular lymph node; slightly painful to touch.   Lungs: Clear to auscultation bilaterally  Abdomen: Soft, nondistended, no guarding or rebound tenderness.   Incision: Midline incision clean, dry, intact  Skin. Maculopapular rash slightly erythematous that extends from torso to bilateral thighs with much improvement and less erythema or irritation   : No bleeding on pad, minimal discharge  Extremities: No lower extremity edema       LABS:               10.4   11.91 )-----------( 627      ( 06-10 @ 05:40 )             31.6                10.3   12.41 )-----------( 617      ( 06-09 @ 05:25 )             31.5                9.6    10.54 )-----------( 546      ( 06-08 @ 05:23 )             29.4       06-10    137    |  100    |  8      ----------------------------<  87     3.8     |  23     |  0.64   06-09    139    |  103    |  5<L>   ----------------------------<  95     3.8     |  22     |  0.57     Ca    9.3        10 Bao 2024 05:40  Ca    9.1        09 Jun 2024 05:25    TPro  6.8    /  Alb  3.4    /  TBili  <0.2   /  DBili  x      /  AST  19     /  ALT  52<H>  /  AlkPhos  198<H>  06-10  TPro  7.0    /  Alb  3.5    /  TBili  0.2    /  DBili  x      /  AST  19     /  ALT  65<H>  /  AlkPhos  218<H>  06-09          Urinalysis Basic - ( 10 Bao 2024 05:40 )    Color: x / Appearance: x / SG: x / pH: x  Gluc: 87 mg/dL / Ketone: x  / Bili: x / Urobili: x   Blood: x / Protein: x / Nitrite: x   Leuk Esterase: x / RBC: x / WBC x   Sq Epi: x / Non Sq Epi: x / Bacteria: x        Assessment/Plan: 30 yo POD#19 s/p  Ex-Lap, TREVON, BSO, Pelvic and para-aortic LND, Omentectomy, Appendectomy, Frozen = Carcinoma. Patient was discharged on POD 4 with improved pain control and afebrile. At home she was in her usual state of health until 5/29 and presented to the ED on 5/30 in the setting of fevers of unknown origin. Patient recently post-op with URI symptoms. CTA neg for PE, but showing small pulmonary nodules. CT Abd/Pelvis reveals post-surgical changes with possible seroma in R adnexa. Patient became febrile to 101F and ID consulted. Zosyn stopped and Flagyl and Cipro started. Derm recommended steroid cream which was ordered for patient. Repeat CT chest and A/P with findings of no PE and improvement in abdominal collection. Last fever on 6/7 at 1p (38.3). Pt hemodynamically stable, afebrile, improving clinically     - Continue PO pain medication  - Rash improving with steroid cream  - URI symptoms improving   - voiding spontaneously, tolerating Reg diet  - continue Apixaban, and SCDs when not ambulating  - continue w/ cipro/flagyl  - monitor VS  - anticipate discharge tomorrow     Senait Tejada PGY1  Seen with Gyn Onc team.

## 2024-06-10 NOTE — PROGRESS NOTE ADULT - SUBJECTIVE AND OBJECTIVE BOX
Patient is a 29y old  Female who presents with a chief complaint of post op fevers (01 Jun 2024 04:50)    f/u fever    Interval History/ROS:  feels better   rash fading   having BMs       PAST MEDICAL & SURGICAL HISTORY:  H/O pelvic mass  Ovarian cystic mass  Urinary tract infection  H/O upper respiratory infection  H/O eczema  History of keloid of skin    Allergies  azithromycin (Hives)  Kiwi (Hives (Mild))    ANTIMICROBIALS:   piperacillin/tazobactam IVPB (5/30-6/5)  meropenem  IVPB (6/4 x2 doses)    ciprofloxacin   IVPB 400 every 12 hours (6/5- 6/9)  metroNIDAZOLE  IVPB 500 every 8 hours (6/5- 6/9)    ciprofloxacin     Tablet 500 every 12 hours  metroNIDAZOLE    Tablet 500 every 12 hours    MEDICATIONS  (STANDING):  albuterol    90 MICROgram(s) HFA Inhaler 2 every 6 hours  albuterol/ipratropium for Nebulization 3 every 6 hours  apixaban 2.5 every 12 hours  benzonatate 100 every 8 hours  budesonide  80 MICROgram(s)/formoterol 4.5 MICROgram(s) Inhaler 2 two times a day  diphenhydrAMINE 25 every 6 hours PRN  diphenhydrAMINE 25 every 6 hours PRN  famotidine    Tablet 20 daily  guaiFENesin  every 12 hours  ibuprofen  Tablet. 600 every 6 hours  pantoprazole    Tablet 40 before breakfast  polyethylene glycol 3350 17 two times a day  senna 2 at bedtime  simethicone 80 every 6 hours PRN    Vital Signs Last 24 Hrs  T(F): 98.4 (06-10-24 @ 13:29), Max: 98.6 (06-10-24 @ 01:45)  HR: 79 (06-10-24 @ 13:29)  BP: 102/74 (06-10-24 @ 13:29)  RR: 18 (06-10-24 @ 13:29)  SpO2: 99% (06-10-24 @ 13:29) (99% - 100%)    PHYSICAL EXAM:  Constitutional: non-toxic  HEAD/EYES: anicteric  ENT:  supple  Cardiovascular:   not tachycardic  Respiratory: not tachypneic  GI:  soft, non-tender, normal bowel sounds; surgical incision c/d/i  :  no atkins  Musculoskeletal:  no synovitis  Neurologic: awake and alert, normal strength, no focal findings  Skin:  resolving rash b/l back / groin  Psychiatric:  awake, alert, appropriate mood                                  10.4   11.91 )-----------( 627      ( 10 Bao 2024 05:40 )             31.6 06-10    137  |  100  |  8   ----------------------------<  87  3.8   |  23  |  0.64  Ca    9.3      10 Bao 2024 05:40  TPro  6.8  /  Alb  3.4  /  TBili  <0.2  /  DBili  x   /  AST  19  /  ALT  52  /  AlkPhos  198  06-10    WBC Count: 10.54 (06-08-24 @ 05:23)  WBC Count: 13.18 (06-07-24 @ 06:35)  WBC Count: 12.10 (06-06-24 @ 05:35)  WBC Count: 14.16 (06-05-24 @ 13:02)  WBC Count: 10.77 (06-05-24 @ 05:30)  WBC Count: 8.81 (06-04-24 @ 05:30)  WBC Count: 9.64 (06-03-24 @ 05:58)  WBC Count: 8.71 (06-02-24 @ 18:11)  WBC Count: 9.27 (06-02-24 @ 05:38)  WBC Count: 10.59 (06-01-24 @ 05:46)  WBC Count: 12.51 (05-31-24 @ 05:31)  WBC Count: 17.54 (05-30-24 @ 17:46)    Auto Eosinophil %: 6.4 % (06-08-24 @ 05:23)  Auto Eosinophil %: 6.1 % (06-07-24 @ 06:35)  Auto Eosinophil %: 4.0 % (06-06-24 @ 05:35)  Auto Eosinophil %: 2.4 % (06-05-24 @ 13:02)  Auto Eosinophil %: 1.8 % (06-04-24 @ 05:30)  Auto Eosinophil %: 1.2 % (06-03-24 @ 05:58)  Auto Eosinophil %: 1.5 % (06-02-24 @ 18:11)  Auto Eosinophil %: 2.6 % (06-02-24 @ 05:38)  Auto Eosinophil %: 1.6 % (06-01-24 @ 05:46)  Auto Eosinophil %: 0.5 % (05-31-24 @ 05:31)    MICROBIOLOGY:  Culture - Urine (collected 06-05-24 @ 16:21)  Source: Clean Catch Clean Catch (Midstream)  Final Report (06-06-24 @ 14:42):    No growth    Culture - Fungal, Blood (collected 06-05-24 @ 16:00)  Source: .Blood Blood  Preliminary Report (06-06-24 @ 09:42):    Testing in progress    Culture - Blood (collected 06-05-24 @ 13:31)  Source: .Blood Blood  Preliminary Report (06-07-24 @ 17:02):    No growth at 48 Hours    Culture - Blood (collected 06-05-24 @ 13:07)  Source: .Blood Blood-Arterial  Preliminary Report (06-07-24 @ 17:02):    No growth at 48 Hours    Culture - Urine (collected 06-03-24 @ 19:03)  Source: Clean Catch Clean Catch (Midstream)  Final Report (06-04-24 @ 22:22):    No growth    Culture - Blood (collected 06-02-24 @ 18:35)  Source: .Blood Blood  Final Report (06-07-24 @ 21:00):    No growth at 5 days    Culture - Blood (collected 06-02-24 @ 18:15)  Source: .Blood Blood  Final Report (06-07-24 @ 22:00):    No growth at 5 days    Culture - Urine (collected 05-30-24 @ 19:43)  Source: Clean Catch Clean Catch (Midstream)  Final Report (06-02-24 @ 21:12):    Culture grew 3 or more types of organisms which indicate    collection contamination; consider recollection only if clinically    indicated.    Culture - Blood (collected 05-30-24 @ 17:26)  Source: .Blood Blood-Peripheral  Final Report (06-04-24 @ 22:01):    No growth at 5 days    Culture - Blood (collected 05-30-24 @ 17:15)  Source: .Blood Blood-Peripheral  Final Report (06-04-24 @ 22:01):    No growth at 5 days    RADIOLOGY:  CT Angio Chest PE Protocol w/ IV Cont (06.05.24 @ 17:39) >  IMPRESSION:  1.  No pulmonary embolism.  2.  Interval decrease in sizes of pelvic and retroperitoneal rim-enhancing collections.  3.  Stable indeterminant 1.0 cm hypoattenuating hepatic focus in the segment 8. Further evaluation with abdominal MR.    CT Abdomen and Pelvis w/ IV Cont (06.02.24 @ 21:30) >  IMPRESSION:  A 7.0 x 2.5 x 2.4 cm abscess in the post hysterectomy bed.  Redemonstrated rim-enhancing fluid collection in the aortocaval space, consistent with postoperative fluid collection, possibly lymphocele, seroma, or abscess.  Indeterminate right hepatic lesion, possibly hemangioma or metastasis (given the history of malignancy). Further evaluation with contrast-enhanced MRI abdomen is recommended.    CT Abdomen and Pelvis w/ IV Cont (05.30.24 @ 19:24) >  IMPRESSION:  No pulmonary embolism.  Status post hysterectomy and bilateral salpingo-oophorectomy. Small free fluid likely postsurgical without discrete rim-enhancing collection.  Hypoattenuating structure surrounding surgical clips along the course of the ligated right ovarian vein, differential for which includes seroma or lymphangioma versus thrombosis, which is considered less likely.

## 2024-06-10 NOTE — PROGRESS NOTE ADULT - ATTENDING COMMENTS
Patient seen on team rounds at noon.   Clinically improved.   Afebrile x48 hours.   Continues on PO antibiotics.   Agree with above assessment. Anticipate DC home tomorrow.

## 2024-06-10 NOTE — PROGRESS NOTE ADULT - SUBJECTIVE AND OBJECTIVE BOX
Subjective:   Pt seen and examined at bedside. No events overnight. Not feeling febrile overnight and endorsing improvement in rash sxs. Pt denies chest pain, SOB, nausea, vomiting, lightheadedness, dizziness.      Objective:    MEDICATIONS  (STANDING):  albuterol    90 MICROgram(s) HFA Inhaler 2 Puff(s) Inhalation every 6 hours  albuterol/ipratropium for Nebulization 3 milliLiter(s) Nebulizer every 6 hours  apixaban 2.5 milliGRAM(s) Oral every 12 hours  benzocaine/menthol Lozenge 1 Lozenge Oral every 4 hours  benzonatate 100 milliGRAM(s) Oral every 8 hours  budesonide  80 MICROgram(s)/formoterol 4.5 MICROgram(s) Inhaler 2 Puff(s) Inhalation two times a day  ciprofloxacin     Tablet 500 milliGRAM(s) Oral every 12 hours  famotidine    Tablet 20 milliGRAM(s) Oral daily  fluticasone propionate 50 MICROgram(s)/spray Nasal Spray 2 Spray(s) Both Nostrils two times a day  guaiFENesin  milliGRAM(s) Oral every 12 hours  ibuprofen  Tablet. 600 milliGRAM(s) Oral every 6 hours  metroNIDAZOLE    Tablet 500 milliGRAM(s) Oral every 12 hours  pantoprazole    Tablet 40 milliGRAM(s) Oral before breakfast  polyethylene glycol 3350 17 Gram(s) Oral two times a day  senna 2 Tablet(s) Oral at bedtime  triamcinolone 0.1% Ointment 1 Application(s) Topical every 12 hours    MEDICATIONS  (PRN):  diphenhydrAMINE 25 milliGRAM(s) Oral every 6 hours PRN Rash and/or Itching  diphenhydrAMINE 25 milliGRAM(s) Oral every 6 hours PRN cough  simethicone 80 milliGRAM(s) Chew every 6 hours PRN Gas      T(F): 98.6 (06-10-24 @ 01:45), Max: 98.6 (06-10-24 @ 01:45)  HR: 79 (06-10-24 @ 01:45) (78 - 95)  BP: 105/62 (06-10-24 @ 01:45) (94/58 - 105/62)  RR: 18 (06-10-24 @ 01:45) (15 - 18)  SpO2: 100% (06-10-24 @ 01:45) (99% - 100%)  Wt(kg): --    PHYSICAL EXAM:  Constitutional: NAD, A+O x3  CV: RRR  Lungs: Clear to auscultation bilaterally  Abdomen: Soft, nondistended, no guarding or rebound tenderness.   Incision: Midline incision clean, dry, intact  Skin. Maculopapular rash slightly erythematous that extends from torso to bilateral thighs  : No bleeding on pad  Extremities: No lower extremity edema       LABS:  06-09    139    |  103    |  5<L>   ----------------------------<  95     3.8     |  22     |  0.57     Ca    9.1        09 Jun 2024 05:25    TPro  7.0    /  Alb  3.5    /  TBili  0.2    /  DBili  x      /  AST  19     /  ALT  65<H>  /  AlkPhos  218<H>  06-09          Urinalysis Basic - ( 09 Jun 2024 05:25 )    Color: x / Appearance: x / SG: x / pH: x  Gluc: 95 mg/dL / Ketone: x  / Bili: x / Urobili: x   Blood: x / Protein: x / Nitrite: x   Leuk Esterase: x / RBC: x / WBC x   Sq Epi: x / Non Sq Epi: x / Bacteria: x      CAPILLARY BLOOD GLUCOSE          I&O's Summary    08 Jun 2024 07:01  -  09 Jun 2024 07:00  --------------------------------------------------------  IN: 1580 mL / OUT: 2200 mL / NET: -620 mL    09 Jun 2024 07:01  -  10 Bao 2024 05:50  --------------------------------------------------------  IN: 240 mL / OUT: 1000 mL / NET: -760 mL           Subjective:   Pt seen and examined at bedside. No events overnight. Not feeling febrile overnight and endorsing improvement in rash sxs. Pt denies chest pain, SOB, nausea, vomiting, lightheadedness, dizziness. Patient complaining of tender small submandibular lymph node.      Objective:    MEDICATIONS  (STANDING):  albuterol    90 MICROgram(s) HFA Inhaler 2 Puff(s) Inhalation every 6 hours  albuterol/ipratropium for Nebulization 3 milliLiter(s) Nebulizer every 6 hours  apixaban 2.5 milliGRAM(s) Oral every 12 hours  benzocaine/menthol Lozenge 1 Lozenge Oral every 4 hours  benzonatate 100 milliGRAM(s) Oral every 8 hours  budesonide  80 MICROgram(s)/formoterol 4.5 MICROgram(s) Inhaler 2 Puff(s) Inhalation two times a day  ciprofloxacin     Tablet 500 milliGRAM(s) Oral every 12 hours  famotidine    Tablet 20 milliGRAM(s) Oral daily  fluticasone propionate 50 MICROgram(s)/spray Nasal Spray 2 Spray(s) Both Nostrils two times a day  guaiFENesin  milliGRAM(s) Oral every 12 hours  ibuprofen  Tablet. 600 milliGRAM(s) Oral every 6 hours  metroNIDAZOLE    Tablet 500 milliGRAM(s) Oral every 12 hours  pantoprazole    Tablet 40 milliGRAM(s) Oral before breakfast  polyethylene glycol 3350 17 Gram(s) Oral two times a day  senna 2 Tablet(s) Oral at bedtime  triamcinolone 0.1% Ointment 1 Application(s) Topical every 12 hours    MEDICATIONS  (PRN):  diphenhydrAMINE 25 milliGRAM(s) Oral every 6 hours PRN Rash and/or Itching  diphenhydrAMINE 25 milliGRAM(s) Oral every 6 hours PRN cough  simethicone 80 milliGRAM(s) Chew every 6 hours PRN Gas      T(F): 98.6 (06-10-24 @ 01:45), Max: 98.6 (06-10-24 @ 01:45)  HR: 79 (06-10-24 @ 01:45) (78 - 95)  BP: 105/62 (06-10-24 @ 01:45) (94/58 - 105/62)  RR: 18 (06-10-24 @ 01:45) (15 - 18)  SpO2: 100% (06-10-24 @ 01:45) (99% - 100%)  Wt(kg): --    PHYSICAL EXAM:  Constitutional: NAD, A+O x3  CV: RRR  Lungs: Clear to auscultation bilaterally  Abdomen: Soft, nondistended, no guarding or rebound tenderness.   Incision: Midline incision clean, dry, intact  Skin. Maculopapular rash slightly erythematous that extends from torso to bilateral thighs with much improvement and less erythema or irritation   : No bleeding on pad, minimal discharge  Extremities: No lower extremity edema       LABS:  06-09    139    |  103    |  5<L>   ----------------------------<  95     3.8     |  22     |  0.57     Ca    9.1        09 Jun 2024 05:25    TPro  7.0    /  Alb  3.5    /  TBili  0.2    /  DBili  x      /  AST  19     /  ALT  65<H>  /  AlkPhos  218<H>  06-09          Urinalysis Basic - ( 09 Jun 2024 05:25 )    Color: x / Appearance: x / SG: x / pH: x  Gluc: 95 mg/dL / Ketone: x  / Bili: x / Urobili: x   Blood: x / Protein: x / Nitrite: x   Leuk Esterase: x / RBC: x / WBC x   Sq Epi: x / Non Sq Epi: x / Bacteria: x      CAPILLARY BLOOD GLUCOSE          I&O's Summary    08 Jun 2024 07:01  -  09 Jun 2024 07:00  --------------------------------------------------------  IN: 1580 mL / OUT: 2200 mL / NET: -620 mL    09 Jun 2024 07:01  -  10 Bao 2024 05:50  --------------------------------------------------------  IN: 240 mL / OUT: 1000 mL / NET: -760 mL           Subjective:   Pt seen and examined at bedside. No events overnight. Not feeling febrile overnight and endorsing improvement in rash sxs. Pt denies chest pain, SOB, nausea, vomiting, lightheadedness, dizziness. Patient complaining of tender small submandibular lymph node.      Objective:    MEDICATIONS  (STANDING):  albuterol    90 MICROgram(s) HFA Inhaler 2 Puff(s) Inhalation every 6 hours  albuterol/ipratropium for Nebulization 3 milliLiter(s) Nebulizer every 6 hours  apixaban 2.5 milliGRAM(s) Oral every 12 hours  benzocaine/menthol Lozenge 1 Lozenge Oral every 4 hours  benzonatate 100 milliGRAM(s) Oral every 8 hours  budesonide  80 MICROgram(s)/formoterol 4.5 MICROgram(s) Inhaler 2 Puff(s) Inhalation two times a day  ciprofloxacin     Tablet 500 milliGRAM(s) Oral every 12 hours  famotidine    Tablet 20 milliGRAM(s) Oral daily  fluticasone propionate 50 MICROgram(s)/spray Nasal Spray 2 Spray(s) Both Nostrils two times a day  guaiFENesin  milliGRAM(s) Oral every 12 hours  ibuprofen  Tablet. 600 milliGRAM(s) Oral every 6 hours  metroNIDAZOLE    Tablet 500 milliGRAM(s) Oral every 12 hours  pantoprazole    Tablet 40 milliGRAM(s) Oral before breakfast  polyethylene glycol 3350 17 Gram(s) Oral two times a day  senna 2 Tablet(s) Oral at bedtime  triamcinolone 0.1% Ointment 1 Application(s) Topical every 12 hours    MEDICATIONS  (PRN):  diphenhydrAMINE 25 milliGRAM(s) Oral every 6 hours PRN Rash and/or Itching  diphenhydrAMINE 25 milliGRAM(s) Oral every 6 hours PRN cough  simethicone 80 milliGRAM(s) Chew every 6 hours PRN Gas      T(F): 98.6 (06-10-24 @ 01:45), Max: 98.6 (06-10-24 @ 01:45)  HR: 79 (06-10-24 @ 01:45) (78 - 95)  BP: 105/62 (06-10-24 @ 01:45) (94/58 - 105/62)  RR: 18 (06-10-24 @ 01:45) (15 - 18)  SpO2: 100% (06-10-24 @ 01:45) (99% - 100%)  Wt(kg): --    PHYSICAL EXAM:  Constitutional: NAD, A+O x3  CV: RRR  HEENT: Small 1cm mobile submandibular lymph node; slightly painful to touch.   Lungs: Clear to auscultation bilaterally  Abdomen: Soft, nondistended, no guarding or rebound tenderness.   Incision: Midline incision clean, dry, intact  Skin. Maculopapular rash slightly erythematous that extends from torso to bilateral thighs with much improvement and less erythema or irritation   : No bleeding on pad, minimal discharge  Extremities: No lower extremity edema       LABS:  06-09    139    |  103    |  5<L>   ----------------------------<  95     3.8     |  22     |  0.57     Ca    9.1        09 Jun 2024 05:25    TPro  7.0    /  Alb  3.5    /  TBili  0.2    /  DBili  x      /  AST  19     /  ALT  65<H>  /  AlkPhos  218<H>  06-09          Urinalysis Basic - ( 09 Jun 2024 05:25 )    Color: x / Appearance: x / SG: x / pH: x  Gluc: 95 mg/dL / Ketone: x  / Bili: x / Urobili: x   Blood: x / Protein: x / Nitrite: x   Leuk Esterase: x / RBC: x / WBC x   Sq Epi: x / Non Sq Epi: x / Bacteria: x      CAPILLARY BLOOD GLUCOSE          I&O's Summary    08 Jun 2024 07:01  -  09 Jun 2024 07:00  --------------------------------------------------------  IN: 1580 mL / OUT: 2200 mL / NET: -620 mL    09 Jun 2024 07:01  -  10 Bao 2024 05:50  --------------------------------------------------------  IN: 240 mL / OUT: 1000 mL / NET: -760 mL

## 2024-06-10 NOTE — PROGRESS NOTE ADULT - PROBLEM SELECTOR PLAN 1
Gyn Onc Fellow Addendum:    Pt seen and examined at bedside on AM rounds. Agree with above.    VS reviewed  Labs reviewed    Afebrile since 6/7, leukocytosis downtrending, clinically stable. Pelvic fluid collection with continued spontaneous drainage per vagina. Continue PO cipro/flagyl.    Continue current pain regimen  Continue symptomatic management for cough / upper respiratory sxs  Reg diet   Voiding spontaneously  Encourage ambulation and IS use  Replete lytes prn  DVT ppx: Eliquis, Venodynes   Dispo: cont inpt mgmt     Yesenia Holm MD.
Gyn Onc Fellow Addendum:    Pt seen and examined at bedside on AM rounds. Agree with above.    VS reviewed  Labs reviewed    Persistent fever however leukocytosis downtrending. BCx neg, UCx contaminated, repeat cxs pending. CXR neg, RVP neg. Repeat CT A/P with 7.0cm abscess in hysterectomy bed, now draining spontaneously from vaginal cuff. Continue to monitor discharge; s/p neg methylene blue tampon test. Cuff otherwise intact. IR consulted, recommend deferring IR drainage at this time. ID following, will plan to broaden antibiotic coverage from Zosyn to meropenem.   Continue current pain regimen  Continue symptomatic management for cough / upper respiratory sxs  Reg diet   Voiding spontaneously  Encourage ambulation and IS use  Replete lytes prn  DVT ppx: Eliquis, Venodynes   Dispo: cont inpt mgmt     Yesenia Holm MD

## 2024-06-10 NOTE — PROGRESS NOTE ADULT - ASSESSMENT
29F with ovarian/ adnexal mass s/p s/p TREVON- BSO, LN dissection 5/21  frozen -- carcinoma; path endometrioid adenocarcinoma; returned 5/30 with fever, SOB, pleuritic chest pain.  Patient reports she was having fever prior to surgery and received a course of amoxicillin pre-op with resolution of fever.  Here, CT no PE, pulmonary nodules, hypoattenuating structure surrounding surgical clip along ligated ovarian vein, BC (-).  Repeat CT noted pelvic abscess  --  IR eval  too risky for aspiration.  Treated with zosyn, however, developed a rash with eosinophilia and increased LFT.  Antibiotics changed to cipro/flagyl.  Repeat CT improved collection.  Constipation 6/7 with low grade fever. Afebrile since.     Post-op collection with fever  - not amenable to IR drainage  - to continue cipro/flagyl  - blood cultures no growth   - afebrile x 72 h     would c/w flagyl po and cipro po --> 5/17    has my contact info for f/u

## 2024-06-10 NOTE — PROGRESS NOTE ADULT - ASSESSMENT
30 yo POD#19 s/p  Ex-Lap, TREVON, BSO, Pelvic and para-aortic LND, Omentectomy, Appendectomy, Frozen = Carcinoma. Patient was discharged on POD 4 with improved pain control and afebrile. At home she was in her usual state of health until 5/29 and presented to the ED on 5/30 in the setting of fevers of unknown origin. Patient recently post-op with URI symptoms. CTA neg for PE, but showing small pulmonary nodules. CT Abd/Pelvis reveals post-surgical changes with possible seroma in R adnexa. Patient became febrile to 101F and ID consulted. Zosyn stopped and Flagyl and Cipro started. Derm recommended steroid cream which was ordered for patient. Repeat CT chest and A/P with findings of no PE and improvement in abdominal collection. Last fever yesterday, 6/7 at 1p (38.3). In the AM patient doing better with no fevers and endorsing less cough overnight.     Neuro: PO ibuprofen PRN, holding tylenol in the setting of transaminitis  CV: Hemodynamically stable  Pulm: O2 sat WNL on RA. Continue encouraging IS use, cough improved  - throat lozenges for cough support  - CTA (6/5) with no PE, stable nodules  - CXR with clear lungs  - Pulm consulted, again she was started on pantoprazole 40 mg daily, duonebs q6, budesonide nebs 0.5 BID   - Repeat RVP panel(6/5) neg  GI: Tolerating regular diet  : Voiding spontaneously  - Patient with clear/yellow vaginal discharge, tampon test performed on 6/2 to evaluate for possible vesicovaginal fistula, negative. On VE by Dr. Lew fluctuance noted at the cuff concerning for possible collection  -  CTAP (6/2) A 7.0 x 2.5 x 2.4 cm abscess in the post hysterectomy bed. Re-demonstrated rim-enhancing fluid collection in the aortocaval space, consistent with postoperative fluid collection, possibly lymphocele, seroma, or abscess.  Heme: apixaban 2.5mg BID; SCDs while in bed  - CTAP (6/5): Hysterectomy. Interval decrease in size of the surgical bed rim-enhancing collection, which is now  into few smaller rim-enhancing loculated collections. One of the collections measures to 3.3 x 2.0 cm. Stable indeterminant 1.0 cm hypoattenuating hepatic focus.  ID: appreciate ID recommendations  - TMax 39.2(6/2@5p) and 6/4 at 5p, most recent fever 38.3 6/7 1p  - c/w IV Flagyl(6/5), IV Cipro(6/5-), pt afebrile ovn  - s/p zosyn (5/30 -6/3, 6/3-6/4), Meropenem(6/3-4), ID believes recent fevers and rash related to delayed rxn to beta lactam   - Leukocytosis trend 17->12.51->10.59->14->12  - Stat Bcx and Ucx from 6/5 obtained after last fever  - Procalcitonin 0.24, full viral panel neg, acute hepatitis panel, HIV, and MRSA swab neg, cyptococcal antigen neg  - UCx (5/30)-f: >3 specimen, previously growing with E faecalis   - RUQ sono (6/1): Centimeter sized central RIGHT hyperechoic lesion, Contracted gallbladder without stones. Possible millimeter size gallbladder mural polyp.  Likely noncontributory to fevers  - GGT (6/1): 266->371  - LLE dopplers (6/1) neg  - f/u urine histoplasma ag, QuantiFeron gold  - F/u BCx(5/30 and 6/2): NGTD  - Ucx(6/3)-NG-f  - IR Recs: Pt not a candidate for drainage 2/2 to poor window cw antibx, monitoring VS. IR reconsulted overnight to reassess for possible drainage  Skin: New onset rash. Benadryl PRN.  Derm recs appreciated; likely mobiliform drug related rash, steroid cream ordered with improvement of sxs  FEN: SLIV, Replete electrolytes PRN.      Dispo: continue inpatient management. Appreciating recommendations from IR, ID, pulmonology, and dermatology    DTaveras PGY2  seen with GYN Onc Team   30 yo POD#19 s/p  Ex-Lap, TREVON, BSO, Pelvic and para-aortic LND, Omentectomy, Appendectomy, Frozen = Carcinoma. Patient was discharged on POD 4 with improved pain control and afebrile. At home she was in her usual state of health until 5/29 and presented to the ED on 5/30 in the setting of fevers of unknown origin. Patient recently post-op with URI symptoms. CTA neg for PE, but showing small pulmonary nodules. CT Abd/Pelvis reveals post-surgical changes with possible seroma in R adnexa. Patient became febrile to 101F and ID consulted. Zosyn stopped and Flagyl and Cipro started. Derm recommended steroid cream which was ordered for patient. Repeat CT chest and A/P with findings of no PE and improvement in abdominal collection. Last fever yesterday, 6/7 at 1p (38.3). In the AM patient doing better with no fevers and endorsing less cough overnight. On PE today, pt with small 1 cm mobile submandibular lymph node.     Neuro: PO ibuprofen PRN, holding tylenol in the setting of transaminitis  CV: Hemodynamically stable  Pulm: O2 sat WNL on RA. Continue encouraging IS use, cough improved  - throat lozenges for cough support  - CTA (6/5) with no PE, stable nodules  - CXR with clear lungs  - Pulm consulted, again she was started on pantoprazole 40 mg daily, duonebs q6, budesonide nebs 0.5 BID   - Repeat RVP panel(6/5) neg  GI: Tolerating regular diet  : Voiding spontaneously  - Patient with clear/yellow vaginal discharge, tampon test performed on 6/2 to evaluate for possible vesicovaginal fistula, negative. On VE by Dr. Lew fluctuance noted at the cuff concerning for possible collection  -  CTAP (6/2) A 7.0 x 2.5 x 2.4 cm abscess in the post hysterectomy bed. Re-demonstrated rim-enhancing fluid collection in the aortocaval space, consistent with postoperative fluid collection, possibly lymphocele, seroma, or abscess.  Heme: apixaban 2.5mg BID; SCDs while in bed  - CTAP (6/5): Hysterectomy. Interval decrease in size of the surgical bed rim-enhancing collection, which is now  into few smaller rim-enhancing loculated collections. One of the collections measures to 3.3 x 2.0 cm. Stable indeterminant 1.0 cm hypoattenuating hepatic focus.  ID: appreciate ID recommendations  - TMax 39.2(6/2@5p) and 6/4 at 5p, most recent fever 38.3 6/7 1p  - c/w Flagyl(6/5), Cipro(6/5-), pt afebrile ovn, transitioned to PO formulation 6/9.   -Pt with 1cm mobile slightly painful submandibular lymph node, most likely inflammatory   Historical ID:  - s/p zosyn (5/30 -6/3, 6/3-6/4), Meropenem(6/3-4), ID believes recent fevers and rash related to delayed rxn to beta lactam   - Leukocytosis trend 17->12.51->10.59->14->12->11.9  - Stat Bcx and Ucx from 6/5 obtained after last fever  - Procalcitonin 0.24, full viral panel neg, acute hepatitis panel, HIV, and MRSA swab neg, cyptococcal antigen neg  - UCx (5/30)-f: >3 specimen, previously growing with E faecalis   - RUQ sono (6/1): Centimeter sized central RIGHT hyperechoic lesion, Contracted gallbladder without stones. Possible millimeter size gallbladder mural polyp.  Likely noncontributory to fevers  -LFTs improving 19/52 patient with previous transaminitis now resolving.   - GGT (6/1): 266->371  - LLE dopplers (6/1) neg  - f/u urine histoplasma ag, QuantiFeron gold  - F/u BCx(5/30 and 6/2): NGTD  - Ucx(6/3)-NG-f  - IR Recs: Pt not a candidate for drainage 2/2 to poor window cw antibx, monitoring VS. IR reconsulted overnight to reassess for possible drainage  Skin: New onset rash. Benadryl PRN.  Derm recs appreciated; likely mobiliform drug related rash, steroid cream ordered with improvement of sxs  FEN: SLIV, Replete electrolytes PRN.      Dispo: continue inpatient management. Appreciating recommendations from IR, ID, pulmonology, and dermatology    DTaveras PGY2  seen with GYN Onc Team

## 2024-06-11 ENCOUNTER — TRANSCRIPTION ENCOUNTER (OUTPATIENT)
Age: 29
End: 2024-06-11

## 2024-06-11 VITALS
DIASTOLIC BLOOD PRESSURE: 76 MMHG | HEART RATE: 84 BPM | RESPIRATION RATE: 18 BRPM | TEMPERATURE: 99 F | SYSTOLIC BLOOD PRESSURE: 106 MMHG | OXYGEN SATURATION: 99 %

## 2024-06-11 LAB
ANION GAP SERPL CALC-SCNC: 13 MMOL/L — SIGNIFICANT CHANGE UP (ref 7–14)
BASOPHILS # BLD AUTO: 0.1 K/UL — SIGNIFICANT CHANGE UP (ref 0–0.2)
BASOPHILS NFR BLD AUTO: 0.9 % — SIGNIFICANT CHANGE UP (ref 0–2)
BUN SERPL-MCNC: 14 MG/DL — SIGNIFICANT CHANGE UP (ref 7–23)
CALCIUM SERPL-MCNC: 9.2 MG/DL — SIGNIFICANT CHANGE UP (ref 8.4–10.5)
CHLORIDE SERPL-SCNC: 101 MMOL/L — SIGNIFICANT CHANGE UP (ref 98–107)
CO2 SERPL-SCNC: 24 MMOL/L — SIGNIFICANT CHANGE UP (ref 22–31)
CREAT SERPL-MCNC: 0.67 MG/DL — SIGNIFICANT CHANGE UP (ref 0.5–1.3)
EGFR: 120 ML/MIN/1.73M2 — SIGNIFICANT CHANGE UP
EOSINOPHIL # BLD AUTO: 0.7 K/UL — HIGH (ref 0–0.5)
EOSINOPHIL NFR BLD AUTO: 6.5 % — HIGH (ref 0–6)
GLUCOSE SERPL-MCNC: 95 MG/DL — SIGNIFICANT CHANGE UP (ref 70–99)
HCT VFR BLD CALC: 31.7 % — LOW (ref 34.5–45)
HGB BLD-MCNC: 10.3 G/DL — LOW (ref 11.5–15.5)
IANC: 6.7 K/UL — SIGNIFICANT CHANGE UP (ref 1.8–7.4)
IMM GRANULOCYTES NFR BLD AUTO: 2.4 % — HIGH (ref 0–0.9)
LYMPHOCYTES # BLD AUTO: 2.54 K/UL — SIGNIFICANT CHANGE UP (ref 1–3.3)
LYMPHOCYTES # BLD AUTO: 23.5 % — SIGNIFICANT CHANGE UP (ref 13–44)
MAGNESIUM SERPL-MCNC: 2.2 MG/DL — SIGNIFICANT CHANGE UP (ref 1.6–2.6)
MCHC RBC-ENTMCNC: 27 PG — SIGNIFICANT CHANGE UP (ref 27–34)
MCHC RBC-ENTMCNC: 32.5 GM/DL — SIGNIFICANT CHANGE UP (ref 32–36)
MCV RBC AUTO: 83 FL — SIGNIFICANT CHANGE UP (ref 80–100)
MONOCYTES # BLD AUTO: 0.51 K/UL — SIGNIFICANT CHANGE UP (ref 0–0.9)
MONOCYTES NFR BLD AUTO: 4.7 % — SIGNIFICANT CHANGE UP (ref 2–14)
NEUTROPHILS # BLD AUTO: 6.7 K/UL — SIGNIFICANT CHANGE UP (ref 1.8–7.4)
NEUTROPHILS NFR BLD AUTO: 62 % — SIGNIFICANT CHANGE UP (ref 43–77)
NRBC # BLD: 0 /100 WBCS — SIGNIFICANT CHANGE UP (ref 0–0)
NRBC # FLD: 0 K/UL — SIGNIFICANT CHANGE UP (ref 0–0)
PHOSPHATE SERPL-MCNC: 4.4 MG/DL — SIGNIFICANT CHANGE UP (ref 2.5–4.5)
PLATELET # BLD AUTO: 646 K/UL — HIGH (ref 150–400)
POTASSIUM SERPL-MCNC: 3.7 MMOL/L — SIGNIFICANT CHANGE UP (ref 3.5–5.3)
POTASSIUM SERPL-SCNC: 3.7 MMOL/L — SIGNIFICANT CHANGE UP (ref 3.5–5.3)
RBC # BLD: 3.82 M/UL — SIGNIFICANT CHANGE UP (ref 3.8–5.2)
RBC # FLD: 13.4 % — SIGNIFICANT CHANGE UP (ref 10.3–14.5)
SODIUM SERPL-SCNC: 138 MMOL/L — SIGNIFICANT CHANGE UP (ref 135–145)
WBC # BLD: 10.81 K/UL — HIGH (ref 3.8–10.5)
WBC # FLD AUTO: 10.81 K/UL — HIGH (ref 3.8–10.5)

## 2024-06-11 RX ORDER — BUDESONIDE AND FORMOTEROL FUMARATE DIHYDRATE 160; 4.5 UG/1; UG/1
2 AEROSOL RESPIRATORY (INHALATION)
Qty: 1 | Refills: 0
Start: 2024-06-11

## 2024-06-11 RX ORDER — METRONIDAZOLE 500 MG
1 TABLET ORAL
Qty: 14 | Refills: 0
Start: 2024-06-11 | End: 2024-06-17

## 2024-06-11 RX ORDER — ALBUTEROL 90 UG/1
2 AEROSOL, METERED ORAL
Qty: 1 | Refills: 0
Start: 2024-06-11

## 2024-06-11 RX ORDER — CIPROFLOXACIN LACTATE 400MG/40ML
1 VIAL (ML) INTRAVENOUS
Qty: 14 | Refills: 0
Start: 2024-06-11 | End: 2024-06-17

## 2024-06-11 RX ADMIN — PANTOPRAZOLE SODIUM 40 MILLIGRAM(S): 20 TABLET, DELAYED RELEASE ORAL at 06:52

## 2024-06-11 RX ADMIN — BENZOCAINE AND MENTHOL 1 LOZENGE: 5; 1 LIQUID ORAL at 17:47

## 2024-06-11 RX ADMIN — BENZOCAINE AND MENTHOL 1 LOZENGE: 5; 1 LIQUID ORAL at 05:43

## 2024-06-11 RX ADMIN — Medication 1 APPLICATION(S): at 09:12

## 2024-06-11 RX ADMIN — Medication 500 MILLIGRAM(S): at 09:13

## 2024-06-11 RX ADMIN — Medication 100 MILLIGRAM(S): at 05:41

## 2024-06-11 RX ADMIN — Medication 500 MILLIGRAM(S): at 18:03

## 2024-06-11 RX ADMIN — APIXABAN 2.5 MILLIGRAM(S): 2.5 TABLET, FILM COATED ORAL at 17:44

## 2024-06-11 RX ADMIN — APIXABAN 2.5 MILLIGRAM(S): 2.5 TABLET, FILM COATED ORAL at 05:42

## 2024-06-11 RX ADMIN — Medication 500 MILLIGRAM(S): at 06:51

## 2024-06-11 NOTE — PROGRESS NOTE ADULT - ASSESSMENT
28 yo POD#21 s/p  Ex-Lap, TREVON, BSO, Pelvic and para-aortic LND, Omentectomy, Appendectomy, Frozen = Carcinoma. Patient was discharged on POD 4 with improved pain control and afebrile. At home she was in her usual state of health until 5/29 and presented to the ED on 5/30 in the setting of fevers of unknown origin. Patient recently post-op with URI symptoms. CTA neg for PE, but showing small pulmonary nodules. CT Abd/Pelvis reveals post-surgical changes with possible seroma in R adnexa. Patient became febrile to 101F and ID consulted. Zosyn stopped and Flagyl and Cipro started. Derm recommended steroid cream which was ordered for patient. Repeat CT chest and A/P with findings of no PE and improvement in abdominal collection. Last fever yesterday, 6/7 at 1p (38.3). Patient is overall doing better with no fevers for 72 hours. On PE yesterday, pt with small 1 cm mobile submandibular lymph node, but no sore throat or other complaints Overnight, no acute events.     Neuro: PO ibuprofen PRN, holding tylenol in the setting of transaminitis  CV: Hemodynamically stable  Pulm: O2 sat WNL on RA. Continue encouraging IS use, cough improved  - throat lozenges for cough support  - CTA (6/5) with no PE, stable nodules  - CXR with clear lungs  - Pulm consulted, again she was started on pantoprazole 40 mg daily, duonebs q6, budesonide nebs 0.5 BID. pt since declined duonebs due to palpitations  - Repeat RVP panel(6/5) neg  GI: Tolerating regular diet  : Voiding spontaneously  - Patient with clear/yellow vaginal discharge, tampon test performed on 6/2 to evaluate for possible vesicovaginal fistula, negative. On VE by Dr. Lew fluctuance noted at the cuff concerning for possible collection  -  CTAP (6/2) A 7.0 x 2.5 x 2.4 cm abscess in the post hysterectomy bed. Re-demonstrated rim-enhancing fluid collection in the aortocaval space, consistent with postoperative fluid collection, possibly lymphocele, seroma, or abscess.  Heme: apixaban 2.5mg BID; SCDs while in bed  - CTAP (6/5): Hysterectomy. Interval decrease in size of the surgical bed rim-enhancing collection, which is now  into few smaller rim-enhancing loculated collections. One of the collections measures to 3.3 x 2.0 cm. Stable indeterminant 1.0 cm hypoattenuating hepatic focus.  ID: appreciate ID recommendations  - TMax 39.2(6/2@5p) and 6/4 at 5p, most recent fever 38.3 6/7 1p  - c/w Flagyl(6/5), Cipro(6/5-), pt afebrile ovn, transitioned to PO formulation 6/9.   - Pt with 1cm mobile slightly painful submandibular lymph node, most likely inflammatory   - Pt to continue abx until 6/17 and for outpatient ID follow up  Historical ID:  - s/p zosyn (5/30 -6/3, 6/3-6/4), Meropenem(6/3-4), ID believes recent fevers and rash related to delayed rxn to beta lactam   - Leukocytosis trend 17->12.51->10.59->14->12->11.9->10.8  - Stat Bcx and Ucx from 6/5 obtained after last fever  - Procalcitonin 0.24, full viral panel neg, acute hepatitis panel, HIV, and MRSA swab neg, cyptococcal antigen neg  - UCx (5/30)-f: >3 specimen, previously growing with E faecalis   - RUQ sono (6/1): Centimeter sized central RIGHT hyperechoic lesion, Contracted gallbladder without stones. Possible millimeter size gallbladder mural polyp.  Likely noncontributory to fevers  -LFTs improving 19/52 patient with previous transaminitis now resolving.   - GGT (6/1): 266->371  - LLE dopplers (6/1) neg  - f/u urine histoplasma ag, QuantiFeron gold  - F/u BCx(5/30 and 6/2): NGTD  - Ucx(6/3)-NG-f  - IR Recs: Pt not a candidate for drainage 2/2 to poor window cw antibx, monitoring VS  Skin: Mobliform rash. Benadryl PRN.  Derm recs appreciated; likely mobiliform drug related rash, steroid cream ordered with improvement of sxs  FEN: SLIV, Replete electrolytes PRN.      Dispo: discharge planning. Appreciating recommendations from IR, ID, pulmonology, and dermatology    Huyen Short, PGY-4   Seen with GYN ONC team

## 2024-06-11 NOTE — PROGRESS NOTE ADULT - REASON FOR ADMISSION
post op fevers

## 2024-06-11 NOTE — PROGRESS NOTE ADULT - NUTRITIONAL ASSESSMENT
This patient has been assessed with a concern for Malnutrition and has been determined to have a diagnosis/diagnoses of Moderate protein-calorie malnutrition.    This patient is being managed with:   Diet Regular-  Entered: May 30 2024 10:32PM  

## 2024-06-11 NOTE — DISCHARGE NOTE NURSING/CASE MANAGEMENT/SOCIAL WORK - PATIENT PORTAL LINK FT
You can access the FollowMyHealth Patient Portal offered by [facility] by registering at the following website: [email or web address]. By joining [facility]’s FollowMyHealth portal, you will also be able to view your health information using other applications (apps) compatible with our system.

## 2024-06-11 NOTE — PROGRESS NOTE ADULT - PROVIDER SPECIALTY LIST ADULT
Gyn Onc
Infectious Disease
Infectious Disease
Pulmonology
Pulmonology
Gyn Onc
Infectious Disease
Infectious Disease
Pulmonology
Gyn Onc
Infectious Disease
Infectious Disease
Gyn Onc
Gyn Onc
Infectious Disease
Infectious Disease
Gyn Onc

## 2024-06-11 NOTE — PROGRESS NOTE ADULT - SUBJECTIVE AND OBJECTIVE BOX
R4 [facility] Progress Note    POD#21   HD#13    Patient seen and examined at bedside.  No acute events overnight. No acute complaints.  Patient reports some lower abdominal cramping but denies any pain. States rash is less itchy. Continues to reports clear vaginal discharge. Reports non productive cough overnight, overall improved. Pt is passing flatus and tolerating PO. Denies CP, SOB, N/V, fevers, and chills.    Vital Signs Last 24 Hours  T(C): 36.8 (06-11-24 @ 05:26), Max: 37 (06-10-24 @ 21:00)  HR: 75 (06-11-24 @ 05:26) (74 - 89)  BP: 99/72 (06-11-24 @ 05:26) (93/68 - 107/77)  RR: 18 (06-11-24 @ 05:26) (17 - 18)  SpO2: 99% (06-11-24 @ 05:26) (98% - 99%)    I&O's Summary    10 Bao 2024 07:01  -  11 Jun 2024 07:00  --------------------------------------------------------  IN: 1200 mL / OUT: 2450 mL / NET: -1250 mL      Physical Exam:  Constitutional: NAD, A+O x3  CV: RRR  HEENT: Small 1cm mobile submandibular lymph node; slightly painful to touch.   Lungs: Clear to auscultation bilaterally  Abdomen: Soft, nondistended, no guarding or rebound tenderness.   Incision: Midline incision clean, dry, intact  Skin. Maculopapular rash slightly erythematous that extends from torso to bilateral thighs with much improvement and less erythema or irritation   : No bleeding on pad, minimal discharge  Extremities: No lower extremity edema     Labs:                                   10.3   10.81 )-----------( 646      ( 11 Jun 2024 05:16 )             31.7       06-10    137  |  100  |  8   ----------------------------<  87  3.8   |  23  |  0.64    Ca    9.3      10 Bao 2024 05:40    TPro  6.8  /  Alb  3.4  /  TBili  <0.2  /  DBili  x   /  AST  19  /  ALT  52<H>  /  AlkPhos  198<H>  06-10        Urinalysis Basic - ( 10 Bao 2024 05:40 )    Color: x / Appearance: x / SG: x / pH: x  Gluc: 87 mg/dL / Ketone: x  / Bili: x / Urobili: x   Blood: x / Protein: x / Nitrite: x   Leuk Esterase: x / RBC: x / WBC x   Sq Epi: x / Non Sq Epi: x / Bacteria: x      MEDICATIONS  (STANDING):  albuterol    90 MICROgram(s) HFA Inhaler 2 Puff(s) Inhalation every 6 hours  albuterol/ipratropium for Nebulization 3 milliLiter(s) Nebulizer every 6 hours  apixaban 2.5 milliGRAM(s) Oral every 12 hours  benzocaine/menthol Lozenge 1 Lozenge Oral every 4 hours  benzonatate 100 milliGRAM(s) Oral every 8 hours  budesonide  80 MICROgram(s)/formoterol 4.5 MICROgram(s) Inhaler 2 Puff(s) Inhalation two times a day  ciprofloxacin     Tablet 500 milliGRAM(s) Oral every 12 hours  famotidine    Tablet 20 milliGRAM(s) Oral daily  fluticasone propionate 50 MICROgram(s)/spray Nasal Spray 2 Spray(s) Both Nostrils two times a day  guaiFENesin  milliGRAM(s) Oral every 12 hours  ibuprofen  Tablet. 600 milliGRAM(s) Oral every 6 hours  metroNIDAZOLE    Tablet 500 milliGRAM(s) Oral every 12 hours  pantoprazole    Tablet 40 milliGRAM(s) Oral before breakfast  polyethylene glycol 3350 17 Gram(s) Oral two times a day  senna 2 Tablet(s) Oral at bedtime  triamcinolone 0.1% Ointment 1 Application(s) Topical every 12 hours    MEDICATIONS  (PRN):  diphenhydrAMINE 25 milliGRAM(s) Oral every 6 hours PRN Rash and/or Itching  diphenhydrAMINE 25 milliGRAM(s) Oral every 6 hours PRN cough  simethicone 80 milliGRAM(s) Chew every 6 hours PRN Gas

## 2024-06-11 NOTE — PROGRESS NOTE ADULT - ATTENDING SUPERVISION STATEMENT
Resident
Resident
Fellow
Resident
Fellow
Fellow
Resident
Resident
Resident/Fellow
Fellow
Resident/Fellow
Resident
Resident/Fellow

## 2024-06-12 ENCOUNTER — NON-APPOINTMENT (OUTPATIENT)
Age: 29
End: 2024-06-12

## 2024-06-12 LAB — ANA TITR SER: NEGATIVE — SIGNIFICANT CHANGE UP

## 2024-06-14 LAB
H CAPSUL AG SPEC-ACNC: SIGNIFICANT CHANGE UP
H CAPSUL AG UR QL IA: SIGNIFICANT CHANGE UP

## 2024-06-21 ENCOUNTER — APPOINTMENT (OUTPATIENT)
Dept: GYNECOLOGIC ONCOLOGY | Facility: CLINIC | Age: 29
End: 2024-06-21
Payer: COMMERCIAL

## 2024-06-21 VITALS
DIASTOLIC BLOOD PRESSURE: 71 MMHG | HEART RATE: 110 BPM | OXYGEN SATURATION: 98 % | WEIGHT: 142 LBS | BODY MASS INDEX: 28.68 KG/M2 | SYSTOLIC BLOOD PRESSURE: 104 MMHG | TEMPERATURE: 98 F | RESPIRATION RATE: 17 BRPM

## 2024-06-21 PROCEDURE — 99024 POSTOP FOLLOW-UP VISIT: CPT

## 2024-06-21 NOTE — REASON FOR VISIT
[de-identified] : 5/21/24 [de-identified] : TREVON/BSO/PPLND/omentectomy/appendectomy [de-identified] : She was admitted to [facility] for fevers postop, and was discharged home with oral antibiotics, now completed  Feels improved.  No further fevers.  Minimal vaginal discharge

## 2024-06-21 NOTE — DISCUSSION/SUMMARY
[Erythema] : was not erythematous [Ecchymosis] : was not ecchymotic [Firm] : soft [Tender] : nontender [Rebound] : no rebound tenderness [Guarding] : no guarding [de-identified] : cuff intact [FreeTextEntry1] : Continuing to recover from postoperative standpoint Discussed pathology in detail with patient and her parents Continue routine postoperative care F/u 1 month Discussed plan for surveillance of ovarian cancer

## 2024-06-21 NOTE — LETTER BODY
[Dear  ___] : Dear  [unfilled], [I recently saw our patient [unfilled] for a follow-up visit.] : I recently saw our patient, [unfilled] for a follow-up visit. [Attached please find my note.] : Attached please find my note. [FreeTextEntry1] : pathology and operative report 5/21/24

## 2024-06-24 ENCOUNTER — NON-APPOINTMENT (OUTPATIENT)
Age: 29
End: 2024-06-24

## 2024-06-27 ENCOUNTER — NON-APPOINTMENT (OUTPATIENT)
Age: 29
End: 2024-06-27

## 2024-06-28 ENCOUNTER — APPOINTMENT (OUTPATIENT)
Dept: PULMONOLOGY | Facility: CLINIC | Age: 29
End: 2024-06-28
Payer: COMMERCIAL

## 2024-06-28 VITALS
SYSTOLIC BLOOD PRESSURE: 108 MMHG | HEART RATE: 71 BPM | RESPIRATION RATE: 15 BRPM | WEIGHT: 140.87 LBS | DIASTOLIC BLOOD PRESSURE: 73 MMHG | BODY MASS INDEX: 28.4 KG/M2 | OXYGEN SATURATION: 97 % | HEIGHT: 59 IN | TEMPERATURE: 98.3 F

## 2024-06-28 DIAGNOSIS — R91.8 OTHER NONSPECIFIC ABNORMAL FINDING OF LUNG FIELD: ICD-10-CM

## 2024-06-28 DIAGNOSIS — C56.3 MALIGNANT NEOPLASM OF BILATERAL OVARIES: ICD-10-CM

## 2024-06-28 PROCEDURE — 99213 OFFICE O/P EST LOW 20 MIN: CPT

## 2024-07-26 ENCOUNTER — APPOINTMENT (OUTPATIENT)
Dept: GYNECOLOGIC ONCOLOGY | Facility: CLINIC | Age: 29
End: 2024-07-26
Payer: COMMERCIAL

## 2024-07-26 VITALS
HEART RATE: 73 BPM | OXYGEN SATURATION: 97 % | WEIGHT: 142 LBS | SYSTOLIC BLOOD PRESSURE: 113 MMHG | RESPIRATION RATE: 15 BRPM | HEIGHT: 59 IN | TEMPERATURE: 98.2 F | DIASTOLIC BLOOD PRESSURE: 79 MMHG | BODY MASS INDEX: 28.63 KG/M2

## 2024-07-26 DIAGNOSIS — N95.1 MENOPAUSAL AND FEMALE CLIMACTERIC STATES: ICD-10-CM

## 2024-07-26 PROCEDURE — 99024 POSTOP FOLLOW-UP VISIT: CPT

## 2024-07-26 RX ORDER — VENLAFAXINE 37.5 MG/1
37.5 TABLET ORAL DAILY
Qty: 30 | Refills: 3 | Status: ACTIVE | COMMUNITY
Start: 2024-07-26 | End: 1900-01-01

## 2024-07-26 NOTE — REASON FOR VISIT
[Post Op] : post op visit [de-identified] : 5/21/24 [de-identified] : TREVON/BSO/PPLND/omentectomy/appendectomy [de-identified] : She went for second opinion at [facility] Path review at [facility] revealed IB grade 2 and adjuvant chemotherapy was recommended.  Genetic testing was performed and results not available.  Also c/o occasional lactation as well as vasomotor symptoms.  Overall feeling better, she saw Dr. Cid and cough has since resolved.  CT chest surveillance is recommended to monitor lung nodules.

## 2024-07-26 NOTE — REASON FOR VISIT
[Post Op] : post op visit [de-identified] : 5/21/24 [de-identified] : TREVON/BSO/PPLND/omentectomy/appendectomy [de-identified] : She went for second opinion at [facility] Path review at [facility] revealed IB grade 2 and adjuvant chemotherapy was recommended.  Genetic testing was performed and results not available.  Also c/o occasional lactation as well as vasomotor symptoms.  Overall feeling better, she saw Dr. Cid and cough has since resolved.  CT chest surveillance is recommended to monitor lung nodules.

## 2024-07-26 NOTE — DISCUSSION/SUMMARY
[Clean] : was clean [Dry] : was dry [Erythema] : was not erythematous [Ecchymosis] : was not ecchymotic [Firm] : soft [Tender] : nontender [Rebound] : no rebound tenderness [Guarding] : no guarding [de-identified] : cuff intact [FreeTextEntry1] : Doing well F/u 3 months Will plan for CA-125, imaging surveillance

## 2024-07-26 NOTE — DISCUSSION/SUMMARY
[Clean] : was clean [Dry] : was dry [Erythema] : was not erythematous [Ecchymosis] : was not ecchymotic [Firm] : soft [Tender] : nontender [Rebound] : no rebound tenderness [Guarding] : no guarding [de-identified] : cuff intact [FreeTextEntry1] : Doing well F/u 3 months Will plan for CA-125, imaging surveillance

## 2024-10-25 ENCOUNTER — APPOINTMENT (OUTPATIENT)
Dept: GYNECOLOGIC ONCOLOGY | Facility: CLINIC | Age: 29
End: 2024-10-25

## 2024-11-01 ENCOUNTER — APPOINTMENT (OUTPATIENT)
Dept: GYNECOLOGIC ONCOLOGY | Facility: CLINIC | Age: 29
End: 2024-11-01
Payer: COMMERCIAL

## 2024-11-01 VITALS
HEIGHT: 59 IN | DIASTOLIC BLOOD PRESSURE: 82 MMHG | HEART RATE: 81 BPM | TEMPERATURE: 98 F | OXYGEN SATURATION: 95 % | SYSTOLIC BLOOD PRESSURE: 120 MMHG | WEIGHT: 151.6 LBS | BODY MASS INDEX: 30.56 KG/M2

## 2024-11-01 DIAGNOSIS — C56.3 MALIGNANT NEOPLASM OF BILATERAL OVARIES: ICD-10-CM

## 2024-11-01 PROCEDURE — 99214 OFFICE O/P EST MOD 30 MIN: CPT

## 2024-11-01 PROCEDURE — 99459 PELVIC EXAMINATION: CPT

## 2025-01-06 NOTE — ED ADULT TRIAGE NOTE - SPO2 (%)
Nasally suctioned with nasal saline and nasal tip aspirator, obtained small amount of think white nasal secretions, pt tolerated well    99 Cheiloplasty (Less Than 50%) Text: A decision was made to reconstruct the defect with a  cheiloplasty.  The defect was undermined extensively.  Additional orbicularis oris muscle was excised with a 15 blade scalpel.  The defect was converted into a full thickness wedge, of less than 50% of the vertical height of the lip, to facilite a better cosmetic result.  Small vessels were then tied off with 5-0 monocyrl. The orbicularis oris, superficial fascia, adipose and dermis were then reapproximated.  After the deeper layers were approximated the epidermis was reapproximated with particular care given to realign the vermilion border.

## 2025-02-14 NOTE — DIETITIAN INITIAL EVALUATION ADULT - FLUID ACCUMULATION
Refill Request       Last Seen: Last Seen Department: 10/22/2024  Last Seen by PCP: 10/22/2024    Last Written: 10/22/24 90 with 1    Next Appointment:   Future Appointments   Date Time Provider Department Center   5/5/2025 10:00 AM Theodore Pavon MD PhD [facility]       Future appointment scheduled      Requested Prescriptions     Pending Prescriptions Disp Refills    losartan (COZAAR) 100 MG tablet [Pharmacy Med Name: LOSARTAN POTASSIUM 100 MG TAB] 90 tablet 1     Sig: TAKE 1 TABLET BY MOUTH EVERY DAY     
never used
Edema 1+ (left foot, right foot) per RN flowsheet

## 2025-05-16 ENCOUNTER — APPOINTMENT (OUTPATIENT)
Dept: GYNECOLOGIC ONCOLOGY | Facility: CLINIC | Age: 30
End: 2025-05-16

## 2025-05-16 DIAGNOSIS — C56.3 MALIGNANT NEOPLASM OF BILATERAL OVARIES: ICD-10-CM

## 2025-06-16 ENCOUNTER — APPOINTMENT (OUTPATIENT)
Dept: GYNECOLOGIC ONCOLOGY | Facility: CLINIC | Age: 30
End: 2025-06-16
Payer: COMMERCIAL

## 2025-06-16 VITALS
WEIGHT: 160 LBS | HEIGHT: 59 IN | OXYGEN SATURATION: 98 % | DIASTOLIC BLOOD PRESSURE: 62 MMHG | TEMPERATURE: 98.3 F | RESPIRATION RATE: 18 BRPM | SYSTOLIC BLOOD PRESSURE: 118 MMHG | BODY MASS INDEX: 32.25 KG/M2 | HEART RATE: 88 BPM

## 2025-06-16 PROCEDURE — 99213 OFFICE O/P EST LOW 20 MIN: CPT

## (undated) DEVICE — ELCTR BOVIE BLADE 3/4" EXTENDED LENGTH 6"

## (undated) DEVICE — SUT VICRYL 0 36" CT-1 UNDYED

## (undated) DEVICE — Device

## (undated) DEVICE — ABDOMINAL BINDER MED/LG 9" X 36"-64"

## (undated) DEVICE — SPONGE PEANUT AUTO COUNT

## (undated) DEVICE — ELCTR GROUNDING PAD ADULT COVIDIEN

## (undated) DEVICE — GLV 5.5 PROTEXIS (BLUE)

## (undated) DEVICE — DRAPE 3/4 SHEET 52X76"

## (undated) DEVICE — PACK MAJOR ABDOMINAL WITH LAP

## (undated) DEVICE — LIGASURE IMPACT

## (undated) DEVICE — SPONGE DISSECTOR PEANUT

## (undated) DEVICE — SPONGE X-RAY 4X8"

## (undated) DEVICE — VENODYNE/SCD SLEEVE CALF MEDIUM

## (undated) DEVICE — SUT VICRYL 0 18" TIES

## (undated) DEVICE — PACK PERI GYN

## (undated) DEVICE — SUT VICRYL 2-0 27" CT-1 UNDYED

## (undated) DEVICE — TRAP SPECIMEN SPUTUM 40CC

## (undated) DEVICE — SUT QUILL MONODERM 3-0 30CM PS-2

## (undated) DEVICE — GLV 5.5 PROTEXIS (WHITE)

## (undated) DEVICE — POSITIONER FOAM EGG CRATE ULNAR 2PCS (PINK)

## (undated) DEVICE — PREP BETADINE KIT

## (undated) DEVICE — WARMING BLANKET UPPER ADULT

## (undated) DEVICE — BASIN SET SINGLE

## (undated) DEVICE — SUT VICRYL 3-0 27" SH UNDYED

## (undated) DEVICE — LAP PAD W RING 18 X 18"

## (undated) DEVICE — SUT PDS II 1 48" TP-1

## (undated) DEVICE — FOLEY TRAY 16FR 5CC LF UMETER CLOSED

## (undated) DEVICE — DRSG DERMABOND PRINEO 22CM

## (undated) DEVICE — GOWN XL

## (undated) DEVICE — ELCTR BOVIE PENCIL SMOKE EVACUATION

## (undated) DEVICE — DRAPE INSTRUMENT POUCH 6.75" X 11"